# Patient Record
Sex: MALE | Race: WHITE | NOT HISPANIC OR LATINO | Employment: OTHER | URBAN - METROPOLITAN AREA
[De-identification: names, ages, dates, MRNs, and addresses within clinical notes are randomized per-mention and may not be internally consistent; named-entity substitution may affect disease eponyms.]

---

## 2017-05-16 ENCOUNTER — CONVERSION ENCOUNTER (OUTPATIENT)
Dept: CARDIOLOGY | Facility: CLINIC | Age: 77
End: 2017-05-16

## 2017-11-28 ENCOUNTER — CONVERSION ENCOUNTER (OUTPATIENT)
Dept: CARDIOLOGY | Facility: CLINIC | Age: 77
End: 2017-11-28

## 2018-07-31 ENCOUNTER — CONVERSION ENCOUNTER (OUTPATIENT)
Dept: CARDIOLOGY | Facility: CLINIC | Age: 78
End: 2018-07-31

## 2019-02-19 ENCOUNTER — CONVERSION ENCOUNTER (OUTPATIENT)
Dept: CARDIOLOGY | Facility: CLINIC | Age: 79
End: 2019-02-19

## 2019-06-04 VITALS
SYSTOLIC BLOOD PRESSURE: 135 MMHG | SYSTOLIC BLOOD PRESSURE: 139 MMHG | DIASTOLIC BLOOD PRESSURE: 76 MMHG | HEART RATE: 61 BPM | DIASTOLIC BLOOD PRESSURE: 83 MMHG | WEIGHT: 190 LBS | HEART RATE: 63 BPM | HEART RATE: 63 BPM | OXYGEN SATURATION: 96 % | OXYGEN SATURATION: 95 % | HEART RATE: 63 BPM | SYSTOLIC BLOOD PRESSURE: 135 MMHG | OXYGEN SATURATION: 93 % | OXYGEN SATURATION: 93 % | DIASTOLIC BLOOD PRESSURE: 82 MMHG | WEIGHT: 186.38 LBS | DIASTOLIC BLOOD PRESSURE: 80 MMHG | WEIGHT: 186.5 LBS | SYSTOLIC BLOOD PRESSURE: 122 MMHG | WEIGHT: 176 LBS

## 2019-08-23 PROBLEM — I25.10 CORONARY ARTERY DISEASE: Status: ACTIVE | Noted: 2019-08-23

## 2019-08-23 PROBLEM — E78.5 HYPERLIPIDEMIA: Status: ACTIVE | Noted: 2019-08-23

## 2019-08-23 PROBLEM — I10 HYPERTENSION: Status: ACTIVE | Noted: 2019-08-23

## 2019-08-23 RX ORDER — METOPROLOL TARTRATE 100 MG/1
TABLET ORAL
COMMUNITY
Start: 2014-11-10

## 2019-08-23 RX ORDER — TERAZOSIN 2 MG/1
CAPSULE ORAL
COMMUNITY
Start: 2014-11-10

## 2019-08-23 RX ORDER — AMLODIPINE BESYLATE 5 MG/1
TABLET ORAL EVERY 24 HOURS
COMMUNITY
Start: 2018-07-31

## 2019-08-23 RX ORDER — CLONAZEPAM 0.5 MG/1
TABLET ORAL
COMMUNITY
Start: 2016-03-22

## 2019-08-23 RX ORDER — CLONIDINE HYDROCHLORIDE 0.2 MG/1
TABLET ORAL EVERY 24 HOURS
COMMUNITY
Start: 2018-07-31

## 2019-08-23 RX ORDER — OMEPRAZOLE 20 MG/1
CAPSULE, DELAYED RELEASE ORAL
COMMUNITY
Start: 2016-10-11

## 2019-09-03 ENCOUNTER — OFFICE VISIT (OUTPATIENT)
Dept: CARDIOLOGY | Facility: CLINIC | Age: 79
End: 2019-09-03

## 2019-09-03 VITALS
HEIGHT: 70 IN | DIASTOLIC BLOOD PRESSURE: 68 MMHG | BODY MASS INDEX: 23.62 KG/M2 | HEART RATE: 54 BPM | OXYGEN SATURATION: 96 % | WEIGHT: 165 LBS | SYSTOLIC BLOOD PRESSURE: 100 MMHG

## 2019-09-03 DIAGNOSIS — E78.00 PURE HYPERCHOLESTEROLEMIA: ICD-10-CM

## 2019-09-03 DIAGNOSIS — I10 ESSENTIAL HYPERTENSION: ICD-10-CM

## 2019-09-03 DIAGNOSIS — N18.2 CHRONIC RENAL IMPAIRMENT, STAGE 2 (MILD): ICD-10-CM

## 2019-09-03 DIAGNOSIS — I25.118 CORONARY ARTERY DISEASE OF NATIVE ARTERY OF NATIVE HEART WITH STABLE ANGINA PECTORIS (HCC): Primary | ICD-10-CM

## 2019-09-03 PROCEDURE — 99213 OFFICE O/P EST LOW 20 MIN: CPT | Performed by: INTERNAL MEDICINE

## 2019-09-03 RX ORDER — HYDROCODONE BITARTRATE AND ACETAMINOPHEN 10; 325 MG/1; MG/1
1 TABLET ORAL 4 TIMES DAILY
Refills: 0 | COMMUNITY
Start: 2019-08-14

## 2019-09-03 RX ORDER — CITALOPRAM 40 MG/1
40 TABLET ORAL DAILY
Refills: 12 | COMMUNITY
Start: 2019-08-27

## 2019-09-03 NOTE — PROGRESS NOTES
"    Subjective:     Encounter Date:09/03/2019      Patient ID: Jeremias Negro is a 79 y.o. male.    Chief Complaint:  History of Present Illness 79-year-old white male with history of coronary artery disease history of hypertension hyperlipidemia and chronic renal insufficiency presents to my office for follow-up.  Patient is currently stable without any symptoms of chest pain but has some shortness of breath with exertion.  No complaints of any PND orthopnea.  No palpitations dizziness syncope or swelling of the feet.  He is taking his medicines regularly.  He continues to smoke.  He is not able to exercise very well.    The following portions of the patient's history were reviewed and updated as appropriate: allergies, current medications, past family history, past medical history, past social history, past surgical history and problem list.  Past Medical History:   Diagnosis Date   • Coronary artery disease    • Hyperlipidemia    • Hypertension      History reviewed. No pertinent surgical history.  /68 (BP Location: Left arm, Patient Position: Sitting)   Pulse 54   Ht 177.8 cm (70\")   Wt 74.8 kg (165 lb)   SpO2 96%   BMI 23.68 kg/m²   History reviewed. No pertinent family history.    Current Outpatient Medications:   •  CloNIDine (CATAPRES) 0.2 MG tablet, Daily., Disp: , Rfl:   •  HYDROcodone-acetaminophen (NORCO)  MG per tablet, Take 1 tablet by mouth 4 (Four) Times a Day., Disp: , Rfl: 0  •  metoprolol tartrate (LOPRESSOR) 100 MG tablet, METOPROLOL TARTRATE 100 MG TABS, Disp: , Rfl:   •  omeprazole (priLOSEC) 20 MG capsule, OMEPRAZOLE 20 MG CPDR, Disp: , Rfl:   •  terazosin (HYTRIN) 2 MG capsule, TERAZOSIN HCL 2 MG CAPS, Disp: , Rfl:   •  amLODIPine (NORVASC) 5 MG tablet, Daily., Disp: , Rfl:   •  citalopram (CeleXA) 40 MG tablet, Take 40 mg by mouth Daily., Disp: , Rfl: 12  •  clonazePAM (KlonoPIN) 0.5 MG tablet, CLONAZEPAM 0.5 MG TABS, Disp: , Rfl:   No Known Allergies  Social History "     Socioeconomic History   • Marital status:      Spouse name: Not on file   • Number of children: Not on file   • Years of education: Not on file   • Highest education level: Not on file   Tobacco Use   • Smoking status: Current Every Day Smoker   Substance and Sexual Activity   • Alcohol use: No     Frequency: Never     Review of Systems   Constitution: Negative for fever and malaise/fatigue.   Cardiovascular: Negative for chest pain, dyspnea on exertion and palpitations.   Respiratory: Positive for shortness of breath. Negative for cough.    Skin: Negative for rash.   Gastrointestinal: Negative for abdominal pain, nausea and vomiting.   Neurological: Negative for focal weakness and headaches.   All other systems reviewed and are negative.             Objective:     Physical Exam   Constitutional: He appears well-developed and well-nourished.   HENT:   Head: Normocephalic and atraumatic.   Eyes: Conjunctivae are normal. No scleral icterus.   Neck: Normal range of motion. Neck supple. No JVD present. Carotid bruit is not present.   Cardiovascular: Normal rate, regular rhythm, S1 normal, S2 normal, normal heart sounds and intact distal pulses. PMI is not displaced.   Pulmonary/Chest: Effort normal and breath sounds normal. He has no wheezes. He has no rales.   Abdominal: Soft. Bowel sounds are normal.   Neurological: He is alert. He has normal strength.   Skin: Skin is warm and dry. No rash noted.     Procedures    Lab Review:       Assessment:          Diagnosis Plan   1. Coronary artery disease of native artery of native heart with stable angina pectoris (CMS/HCC)     2. Essential hypertension     3. Pure hypercholesterolemia     4. Chronic renal impairment, stage 2 (mild)            Plan:       Patient has history of coronary disease and is currently on medical therapy and stable.  Patient also has COPD and he still continues to smoke.  Patient blood pressure and heart rate are stable per  Patient's lipid  levels are followed by primary care doctor.  His total cholesterol and LDL are high and he should be on a statin.  Patient has chronic renal insufficiency and is followed by the primary care doctor per  Continue current medicines and follow him in 6 months.

## 2025-06-24 ENCOUNTER — HOSPITAL ENCOUNTER (INPATIENT)
Facility: HOSPITAL | Age: 85
LOS: 7 days | Discharge: HOME | End: 2025-07-01
Attending: EMERGENCY MEDICINE | Admitting: INTERNAL MEDICINE
Payer: MEDICARE

## 2025-06-24 ENCOUNTER — APPOINTMENT (OUTPATIENT)
Dept: GENERAL RADIOLOGY | Facility: HOSPITAL | Age: 85
End: 2025-06-24
Payer: MEDICARE

## 2025-06-24 DIAGNOSIS — D72.829 LEUKOCYTOSIS, UNSPECIFIED TYPE: ICD-10-CM

## 2025-06-24 DIAGNOSIS — N17.9 ACUTE KIDNEY INJURY: ICD-10-CM

## 2025-06-24 DIAGNOSIS — R53.1 WEAKNESS: Primary | ICD-10-CM

## 2025-06-24 DIAGNOSIS — M25.552 LEFT HIP PAIN: ICD-10-CM

## 2025-06-24 DIAGNOSIS — J43.9 PULMONARY EMPHYSEMA, UNSPECIFIED EMPHYSEMA TYPE: ICD-10-CM

## 2025-06-24 DIAGNOSIS — I48.0 PAROXYSMAL ATRIAL FIBRILLATION: ICD-10-CM

## 2025-06-24 PROBLEM — E87.6 HYPOKALEMIA: Status: ACTIVE | Noted: 2025-06-24

## 2025-06-24 PROBLEM — D64.9 ANEMIA: Status: ACTIVE | Noted: 2025-06-24

## 2025-06-24 PROBLEM — R79.89 ELEVATED LACTIC ACID LEVEL: Status: ACTIVE | Noted: 2025-06-24

## 2025-06-24 LAB
ALBUMIN SERPL-MCNC: 2.9 G/DL (ref 3.5–5.2)
ALBUMIN/GLOB SERPL: 0.9 G/DL
ALP SERPL-CCNC: 124 U/L (ref 39–117)
ALT SERPL W P-5'-P-CCNC: 6 U/L (ref 1–41)
ANION GAP SERPL CALCULATED.3IONS-SCNC: 15 MMOL/L (ref 5–15)
AST SERPL-CCNC: 23 U/L (ref 1–40)
BILIRUB SERPL-MCNC: 0.7 MG/DL (ref 0–1.2)
BUN SERPL-MCNC: 28.8 MG/DL (ref 8–23)
BUN/CREAT SERPL: 14.2 (ref 7–25)
CALCIUM SPEC-SCNC: 9 MG/DL (ref 8.6–10.5)
CHLORIDE SERPL-SCNC: 105 MMOL/L (ref 98–107)
CK SERPL-CCNC: 46 U/L (ref 20–200)
CO2 SERPL-SCNC: 20 MMOL/L (ref 22–29)
CREAT SERPL-MCNC: 2.03 MG/DL (ref 0.76–1.27)
D-LACTATE SERPL-SCNC: 3.3 MMOL/L (ref 0.3–2)
D-LACTATE SERPL-SCNC: 3.4 MMOL/L (ref 0.5–2)
DEPRECATED RDW RBC AUTO: 57.1 FL (ref 37–54)
EGFRCR SERPLBLD CKD-EPI 2021: 31.9 ML/MIN/1.73
EOSINOPHIL # BLD MANUAL: 0.97 10*3/MM3 (ref 0–0.4)
EOSINOPHIL NFR BLD MANUAL: 2 % (ref 0.3–6.2)
ERYTHROCYTE [DISTWIDTH] IN BLOOD BY AUTOMATED COUNT: 17.1 % (ref 12.3–15.4)
FERRITIN SERPL-MCNC: 667 NG/ML (ref 30–400)
GLOBULIN UR ELPH-MCNC: 3.4 GM/DL
GLUCOSE SERPL-MCNC: 108 MG/DL (ref 65–99)
HCT VFR BLD AUTO: 32.1 % (ref 37.5–51)
HGB BLD-MCNC: 9.9 G/DL (ref 13–17.7)
HOLD SPECIMEN: NORMAL
IRON 24H UR-MRATE: 38 MCG/DL (ref 59–158)
IRON SATN MFR SERPL: 19 % (ref 20–50)
LYMPHOCYTES # BLD MANUAL: 1.94 10*3/MM3 (ref 0.7–3.1)
LYMPHOCYTES NFR BLD MANUAL: 2 % (ref 5–12)
MAGNESIUM SERPL-MCNC: 2.3 MG/DL (ref 1.6–2.4)
MCH RBC QN AUTO: 28.8 PG (ref 26.6–33)
MCHC RBC AUTO-ENTMCNC: 30.8 G/DL (ref 31.5–35.7)
MCV RBC AUTO: 93.3 FL (ref 79–97)
MONOCYTES # BLD: 0.97 10*3/MM3 (ref 0.1–0.9)
NEUTROPHILS # BLD AUTO: 44.55 10*3/MM3 (ref 1.7–7)
NEUTROPHILS NFR BLD MANUAL: 92 % (ref 42.7–76)
PLATELET # BLD AUTO: 315 10*3/MM3 (ref 140–450)
PMV BLD AUTO: 9.5 FL (ref 6–12)
POTASSIUM SERPL-SCNC: 3.3 MMOL/L (ref 3.5–5.2)
PROCALCITONIN SERPL-MCNC: 0.28 NG/ML (ref 0–0.25)
PROT SERPL-MCNC: 6.3 G/DL (ref 6–8.5)
RBC # BLD AUTO: 3.44 10*6/MM3 (ref 4.14–5.8)
RBC MORPH BLD: NORMAL
SCAN SLIDE: NORMAL
SMALL PLATELETS BLD QL SMEAR: ADEQUATE
SODIUM SERPL-SCNC: 140 MMOL/L (ref 136–145)
TIBC SERPL-MCNC: 204 MCG/DL (ref 298–536)
TRANSFERRIN SERPL-MCNC: 137 MG/DL (ref 200–360)
TSH SERPL DL<=0.05 MIU/L-ACNC: 1.49 UIU/ML (ref 0.27–4.2)
VARIANT LYMPHS NFR BLD MANUAL: 4 % (ref 19.6–45.3)
WBC MORPH BLD: NORMAL
WBC NRBC COR # BLD AUTO: 48.42 10*3/MM3 (ref 3.4–10.8)
WHOLE BLOOD HOLD COAG: NORMAL

## 2025-06-24 PROCEDURE — 85025 COMPLETE CBC W/AUTO DIFF WBC: CPT | Performed by: EMERGENCY MEDICINE

## 2025-06-24 PROCEDURE — 82728 ASSAY OF FERRITIN: CPT

## 2025-06-24 PROCEDURE — 82550 ASSAY OF CK (CPK): CPT | Performed by: EMERGENCY MEDICINE

## 2025-06-24 PROCEDURE — 93010 ELECTROCARDIOGRAM REPORT: CPT | Performed by: INTERNAL MEDICINE

## 2025-06-24 PROCEDURE — 83735 ASSAY OF MAGNESIUM: CPT | Performed by: EMERGENCY MEDICINE

## 2025-06-24 PROCEDURE — 82746 ASSAY OF FOLIC ACID SERUM: CPT

## 2025-06-24 PROCEDURE — 83605 ASSAY OF LACTIC ACID: CPT

## 2025-06-24 PROCEDURE — 84145 PROCALCITONIN (PCT): CPT

## 2025-06-24 PROCEDURE — 82607 VITAMIN B-12: CPT

## 2025-06-24 PROCEDURE — 25810000003 SODIUM CHLORIDE 0.9 % SOLUTION

## 2025-06-24 PROCEDURE — 85007 BL SMEAR W/DIFF WBC COUNT: CPT | Performed by: EMERGENCY MEDICINE

## 2025-06-24 PROCEDURE — 87040 BLOOD CULTURE FOR BACTERIA: CPT | Performed by: EMERGENCY MEDICINE

## 2025-06-24 PROCEDURE — 36415 COLL VENOUS BLD VENIPUNCTURE: CPT

## 2025-06-24 PROCEDURE — 84443 ASSAY THYROID STIM HORMONE: CPT | Performed by: EMERGENCY MEDICINE

## 2025-06-24 PROCEDURE — 83540 ASSAY OF IRON: CPT

## 2025-06-24 PROCEDURE — 80053 COMPREHEN METABOLIC PANEL: CPT | Performed by: EMERGENCY MEDICINE

## 2025-06-24 PROCEDURE — 71045 X-RAY EXAM CHEST 1 VIEW: CPT

## 2025-06-24 PROCEDURE — 99291 CRITICAL CARE FIRST HOUR: CPT

## 2025-06-24 PROCEDURE — 93005 ELECTROCARDIOGRAM TRACING: CPT

## 2025-06-24 PROCEDURE — 84466 ASSAY OF TRANSFERRIN: CPT

## 2025-06-24 PROCEDURE — 25010000002 PIPERACILLIN SOD-TAZOBACTAM PER 1 G: Performed by: EMERGENCY MEDICINE

## 2025-06-24 RX ORDER — POTASSIUM CHLORIDE 1500 MG/1
20 TABLET, EXTENDED RELEASE ORAL ONCE
Status: COMPLETED | OUTPATIENT
Start: 2025-06-24 | End: 2025-06-24

## 2025-06-24 RX ORDER — ENOXAPARIN SODIUM 100 MG/ML
1 INJECTION SUBCUTANEOUS EVERY 24 HOURS
Status: DISCONTINUED | OUTPATIENT
Start: 2025-06-25 | End: 2025-06-27

## 2025-06-24 RX ORDER — NITROGLYCERIN 0.4 MG/1
0.4 TABLET SUBLINGUAL
Status: DISCONTINUED | OUTPATIENT
Start: 2025-06-24 | End: 2025-07-01 | Stop reason: HOSPADM

## 2025-06-24 RX ORDER — METOPROLOL TARTRATE 50 MG
100 TABLET ORAL DAILY
Status: DISCONTINUED | OUTPATIENT
Start: 2025-06-24 | End: 2025-06-24

## 2025-06-24 RX ORDER — AMLODIPINE BESYLATE 5 MG/1
5 TABLET ORAL EVERY 24 HOURS
Status: DISCONTINUED | OUTPATIENT
Start: 2025-06-24 | End: 2025-07-01 | Stop reason: HOSPADM

## 2025-06-24 RX ORDER — BISACODYL 5 MG/1
5 TABLET, DELAYED RELEASE ORAL DAILY PRN
Status: DISCONTINUED | OUTPATIENT
Start: 2025-06-24 | End: 2025-06-25

## 2025-06-24 RX ORDER — CLONAZEPAM 0.5 MG/1
0.5 TABLET ORAL DAILY PRN
Status: DISCONTINUED | OUTPATIENT
Start: 2025-06-24 | End: 2025-06-24

## 2025-06-24 RX ORDER — CITALOPRAM HYDROBROMIDE 20 MG/1
40 TABLET ORAL DAILY
Status: DISCONTINUED | OUTPATIENT
Start: 2025-06-24 | End: 2025-07-01 | Stop reason: HOSPADM

## 2025-06-24 RX ORDER — CLONIDINE HYDROCHLORIDE 0.1 MG/1
0.2 TABLET ORAL EVERY 24 HOURS
Status: DISCONTINUED | OUTPATIENT
Start: 2025-06-24 | End: 2025-07-01 | Stop reason: HOSPADM

## 2025-06-24 RX ORDER — HYDROCODONE BITARTRATE AND ACETAMINOPHEN 10; 325 MG/1; MG/1
1 TABLET ORAL 4 TIMES DAILY
Status: DISCONTINUED | OUTPATIENT
Start: 2025-06-24 | End: 2025-06-24

## 2025-06-24 RX ORDER — HYDROCODONE BITARTRATE AND ACETAMINOPHEN 10; 325 MG/1; MG/1
1 TABLET ORAL EVERY 6 HOURS PRN
Refills: 0 | Status: DISCONTINUED | OUTPATIENT
Start: 2025-06-24 | End: 2025-07-01 | Stop reason: HOSPADM

## 2025-06-24 RX ORDER — ACETAMINOPHEN 325 MG/1
650 TABLET ORAL EVERY 4 HOURS PRN
Status: DISCONTINUED | OUTPATIENT
Start: 2025-06-24 | End: 2025-07-01 | Stop reason: HOSPADM

## 2025-06-24 RX ORDER — SODIUM CHLORIDE 0.9 % (FLUSH) 0.9 %
10 SYRINGE (ML) INJECTION EVERY 12 HOURS SCHEDULED
Status: DISCONTINUED | OUTPATIENT
Start: 2025-06-24 | End: 2025-07-01 | Stop reason: HOSPADM

## 2025-06-24 RX ORDER — SODIUM CHLORIDE 0.9 % (FLUSH) 0.9 %
10 SYRINGE (ML) INJECTION AS NEEDED
Status: DISCONTINUED | OUTPATIENT
Start: 2025-06-24 | End: 2025-07-01 | Stop reason: HOSPADM

## 2025-06-24 RX ORDER — POLYETHYLENE GLYCOL 3350 17 G/17G
17 POWDER, FOR SOLUTION ORAL DAILY PRN
Status: DISCONTINUED | OUTPATIENT
Start: 2025-06-24 | End: 2025-06-25

## 2025-06-24 RX ORDER — PANTOPRAZOLE SODIUM 40 MG/1
40 TABLET, DELAYED RELEASE ORAL
Status: DISCONTINUED | OUTPATIENT
Start: 2025-06-25 | End: 2025-07-01 | Stop reason: HOSPADM

## 2025-06-24 RX ORDER — SODIUM CHLORIDE 9 MG/ML
100 INJECTION, SOLUTION INTRAVENOUS CONTINUOUS
Status: DISPENSED | OUTPATIENT
Start: 2025-06-24 | End: 2025-06-26

## 2025-06-24 RX ORDER — ONDANSETRON 2 MG/ML
4 INJECTION INTRAMUSCULAR; INTRAVENOUS EVERY 6 HOURS PRN
Status: DISCONTINUED | OUTPATIENT
Start: 2025-06-24 | End: 2025-07-01 | Stop reason: HOSPADM

## 2025-06-24 RX ORDER — SODIUM CHLORIDE 9 MG/ML
40 INJECTION, SOLUTION INTRAVENOUS AS NEEDED
Status: DISCONTINUED | OUTPATIENT
Start: 2025-06-24 | End: 2025-07-01 | Stop reason: HOSPADM

## 2025-06-24 RX ORDER — ONDANSETRON 4 MG/1
4 TABLET, ORALLY DISINTEGRATING ORAL EVERY 6 HOURS PRN
Status: DISCONTINUED | OUTPATIENT
Start: 2025-06-24 | End: 2025-07-01 | Stop reason: HOSPADM

## 2025-06-24 RX ORDER — AMOXICILLIN 250 MG
2 CAPSULE ORAL 2 TIMES DAILY PRN
Status: DISCONTINUED | OUTPATIENT
Start: 2025-06-24 | End: 2025-06-25

## 2025-06-24 RX ORDER — BISACODYL 10 MG
10 SUPPOSITORY, RECTAL RECTAL DAILY PRN
Status: DISCONTINUED | OUTPATIENT
Start: 2025-06-24 | End: 2025-06-25

## 2025-06-24 RX ADMIN — Medication 10 ML: at 22:58

## 2025-06-24 RX ADMIN — Medication 12.5 MG: at 22:56

## 2025-06-24 RX ADMIN — PIPERACILLIN AND TAZOBACTAM 3.38 G: 3; .375 INJECTION, POWDER, FOR SOLUTION INTRAVENOUS at 18:57

## 2025-06-24 RX ADMIN — SODIUM CHLORIDE, POTASSIUM CHLORIDE, SODIUM LACTATE AND CALCIUM CHLORIDE 1770 ML: 600; 310; 30; 20 INJECTION, SOLUTION INTRAVENOUS at 17:35

## 2025-06-24 RX ADMIN — POTASSIUM CHLORIDE 20 MEQ: 1500 TABLET, EXTENDED RELEASE ORAL at 22:56

## 2025-06-24 RX ADMIN — SODIUM CHLORIDE 100 ML/HR: 9 INJECTION, SOLUTION INTRAVENOUS at 23:41

## 2025-06-24 NOTE — ED NOTES
Pt sent by primary care due to elevated white count; no other complaints at this time. Family at bedside, pt applied to monitor, stretcher in low position.

## 2025-06-24 NOTE — ED PROVIDER NOTES
Subjective   History of Present Illness  Chief complaint sent by my doctor because they think I have leukemia with an abnormal white blood count I am supposed be admitted to the hospital    History of present illness 84-year-old gentleman who states has been feeling bad for several weeks.  He states that he went to the doctor yesterday they did a blood count they called today and said his white count was 42,000 he needed to go to the hospital to be admitted and worked up for leukemia.  Patient denies any cough congestion vomiting diarrhea no recent trauma or injury otherwise.  Denies any urinary complaints he has been somewhat constipated.  No shortness of breath no headache or vision change speech difficulty just generalized weakness.  No tick bites or rashes      Review of Systems   Constitutional:  Positive for fatigue. Negative for chills and fever.   Respiratory:  Negative for cough, chest tightness and shortness of breath.    Cardiovascular:  Negative for chest pain, palpitations and leg swelling.   Gastrointestinal:  Positive for constipation. Negative for abdominal pain, blood in stool and vomiting.   Genitourinary:  Negative for difficulty urinating and dysuria.   Skin:  Negative for rash.   Neurological:  Positive for weakness. Negative for facial asymmetry and speech difficulty.   Psychiatric/Behavioral:  Negative for confusion.        Past Medical History:   Diagnosis Date    Coronary artery disease     Hyperlipidemia     Hypertension        No Known Allergies    No past surgical history on file.    No family history on file.    Social History     Socioeconomic History    Marital status:    Tobacco Use    Smoking status: Every Day   Substance and Sexual Activity    Alcohol use: No     Prior to Admission medications    Medication Sig Start Date End Date Taking? Authorizing Provider   metoprolol tartrate (LOPRESSOR) 100 MG tablet METOPROLOL TARTRATE 100 MG TABS 11/10/14  Yes Provider, MD Maria Luz    amLODIPine (NORVASC) 5 MG tablet Daily. 7/31/18   Maria Luz Carey MD   citalopram (CeleXA) 40 MG tablet Take 1 tablet by mouth Daily. 8/27/19   Maria Luz Carey MD   CloNIDine (CATAPRES) 0.2 MG tablet Daily. 7/31/18   Maria Luz Carey MD   HYDROcodone-acetaminophen (NORCO)  MG per tablet Take 1 tablet by mouth 4 (Four) Times a Day. 8/14/19   Maria Luz Carey MD   omeprazole (priLOSEC) 20 MG capsule OMEPRAZOLE 20 MG CPDR 10/11/16   Maria Luz Carey MD   terazosin (HYTRIN) 2 MG capsule TERAZOSIN HCL 2 MG CAPS 11/10/14   Maria Luz Carey MD   clonazePAM (KlonoPIN) 0.5 MG tablet CLONAZEPAM 0.5 MG TABS 3/22/16 6/24/25  Maria Luz Carey MD          Objective   Physical Exam  Constitutional is an 84-year-old gentleman awake alert no acute distress but chronically ill-appearing triage vital signs have been reviewed HEENT extraocular muscles are intact pupils equal round reactive there is no photophobia mouth is clear neck supple no adenopathy no JV no bruits no meningeal signs.  Lungs rhonchi throughout but no retraction no use of accessories heart regular without murmur abdomen was soft nontender nondistended good bowel sounds patient has an enlarged liver on palpation.  Feels like he has had some superficial nodes.  No pulsatile masses extremities pulses equal upper extremities some edema in his legs no cords or Homans' sign no evidence of DVT skin is warm dry without rashes or cellulitic changes he is got a mass to the right shoulder and its hard but nontender.  No redness or warmth or signs of infection neurologic awake alert orientated x 3 no face asymmetry speech normal no focal weak  Procedures           ED Course      Results for orders placed or performed during the hospital encounter of 06/24/25   Comprehensive Metabolic Panel    Collection Time: 06/24/25  5:20 PM    Specimen: Blood   Result Value Ref Range    Glucose 108 (H) 65 - 99 mg/dL    BUN 28.8 (H) 8.0 - 23.0  mg/dL    Creatinine 2.03 (H) 0.76 - 1.27 mg/dL    Sodium 140 136 - 145 mmol/L    Potassium 3.3 (L) 3.5 - 5.2 mmol/L    Chloride 105 98 - 107 mmol/L    CO2 20.0 (L) 22.0 - 29.0 mmol/L    Calcium 9.0 8.6 - 10.5 mg/dL    Total Protein 6.3 6.0 - 8.5 g/dL    Albumin 2.9 (L) 3.5 - 5.2 g/dL    ALT (SGPT) 6 1 - 41 U/L    AST (SGOT) 23 1 - 40 U/L    Alkaline Phosphatase 124 (H) 39 - 117 U/L    Total Bilirubin 0.7 0.0 - 1.2 mg/dL    Globulin 3.4 gm/dL    A/G Ratio 0.9 g/dL    BUN/Creatinine Ratio 14.2 7.0 - 25.0    Anion Gap 15.0 5.0 - 15.0 mmol/L    eGFR 31.9 (L) >60.0 mL/min/1.73   Magnesium    Collection Time: 06/24/25  5:20 PM    Specimen: Blood   Result Value Ref Range    Magnesium 2.3 1.6 - 2.4 mg/dL   CK    Collection Time: 06/24/25  5:20 PM    Specimen: Blood   Result Value Ref Range    Creatine Kinase 46 20 - 200 U/L   TSH Rfx On Abnormal To Free T4    Collection Time: 06/24/25  5:20 PM    Specimen: Blood   Result Value Ref Range    TSH 1.490 0.270 - 4.200 uIU/mL   CBC Auto Differential    Collection Time: 06/24/25  5:20 PM    Specimen: Blood   Result Value Ref Range    WBC 48.42 (C) 3.40 - 10.80 10*3/mm3    RBC 3.44 (L) 4.14 - 5.80 10*6/mm3    Hemoglobin 9.9 (L) 13.0 - 17.7 g/dL    Hematocrit 32.1 (L) 37.5 - 51.0 %    MCV 93.3 79.0 - 97.0 fL    MCH 28.8 26.6 - 33.0 pg    MCHC 30.8 (L) 31.5 - 35.7 g/dL    RDW 17.1 (H) 12.3 - 15.4 %    RDW-SD 57.1 (H) 37.0 - 54.0 fl    MPV 9.5 6.0 - 12.0 fL    Platelets 315 140 - 450 10*3/mm3   Scan Slide    Collection Time: 06/24/25  5:20 PM    Specimen: Blood   Result Value Ref Range    Scan Slide     Manual Differential    Collection Time: 06/24/25  5:20 PM    Specimen: Blood   Result Value Ref Range    Neutrophil % 92.0 (H) 42.7 - 76.0 %    Lymphocyte % 4.0 (L) 19.6 - 45.3 %    Monocyte % 2.0 (L) 5.0 - 12.0 %    Eosinophil % 2.0 0.3 - 6.2 %    Neutrophils Absolute 44.55 (H) 1.70 - 7.00 10*3/mm3    Lymphocytes Absolute 1.94 0.70 - 3.10 10*3/mm3    Monocytes Absolute 0.97 (H) 0.10 -  0.90 10*3/mm3    Eosinophils Absolute 0.97 (H) 0.00 - 0.40 10*3/mm3    RBC Morphology Normal Normal    WBC Morphology Normal Normal    Platelet Estimate Adequate Normal   Gold Top - SST    Collection Time: 06/24/25  5:20 PM   Result Value Ref Range    Extra Tube Hold for add-ons.    Light Blue Top    Collection Time: 06/24/25  5:20 PM   Result Value Ref Range    Extra Tube Hold for add-ons.    POC Lactate    Collection Time: 06/24/25  5:24 PM    Specimen: Blood   Result Value Ref Range    Lactate 3.3 (C) 0.3 - 2.0 mmol/L     XR Chest 1 View  Result Date: 6/24/2025  Impression: Vague reticulonodular airspace opacities suspicious for infectious/inflammatory process. Etiologies may include bronchiolitis, aspiration or developing bronchopneumonia. Electronically Signed: Andres Llanos MD  6/24/2025 5:53 PM EDT  Workstation ID: [id number]    Medications   sodium chloride 0.9 % flush 10 mL (has no administration in time range)   piperacillin-tazobactam (ZOSYN) 3.375 g IVPB in 100 mL NS MBP (CD) (has no administration in time range)   sepsis fluid LR bolus 1,770 mL (1,770 mL Intravenous New Bag 6/24/25 1735)       SEPTIC SHOCK FOCUSED EXAM ATTESTATION    I attest that I have reassessed tissue perfusion after the fluid bolus given.    Tomer Rivera MD  06/24/25  22:29 EDT                                                  Medical Decision Making  Medical Decision Making patient's initial blood pressure was in the 80s.  He triggered sepsis protocol labs obtaining cultures then he is given a sepsis bolus 30.  Kilo bolus of lactated Ringer's and started on Zosyn.  Patient's labs obtained my independent review comprehensive metabolic profile remarkable BUN 28 and creatinine 2.03 potassium 3.3 normal.  CK normal TSH normal CBC remarkable for white count of 48,000 hemoglobin of 9.9 platelets were 315,000 cultures pending lactic acid 3.3.  Patient has been cultured up started on Zosyn and receiving IV fluids 30 mL/kg bolus blood  pressure did improve to the 110 range.  Patient resting company no distress.  Chest x-ray obtained my independent review is got a lot of sort of reticulonodular pattern.  I do not see any pneumothorax or failure radiologist is vague reticulonodular airspace opacity suspicious for infectious or inflammatory process.  Patient on repeat exam resting comfortably I do not see any evidence that suggest an acute intra-abdominal process such as aneurysm or dissection or ischemic bowel or bowel obstruction or perforation appendicitis diverticulitis cholecystitis I see no evidence that suggest an acute stroke meningitis or encephalitis or skin changes or cellulitic changes based on my history and physical clinical finds although not a complete list of all possibilities.  Records show that he was admitted to [facility] back in January of this year he had COVID-19 his respiratory issues and hypoxia his CBC was normal at that time.  I talked to the nurse practitioner for Dr Collazo and the case is discussed and patient was sent here by the primary care provider to be admitted for workup for leukemia.  Hypotension resolved repeat lactates were obtained.  Critical care 35-minute.  Patient feeling much better vital signs are stable lungs are some rhonchi throughout heart was regular abdomen soft without a particular tenderness no skin changes good cap refill.  This is after IV fluids    Problems Addressed:  Acute kidney injury: complicated acute illness or injury  Leukocytosis, unspecified type: complicated acute illness or injury  Weakness: complicated acute illness or injury    Amount and/or Complexity of Data Reviewed  External Data Reviewed: labs and notes.  Labs: ordered. Decision-making details documented in ED Course.  Radiology: ordered and independent interpretation performed. Decision-making details documented in ED Course.    Risk  Prescription drug management.  Decision regarding hospitalization.        Final  diagnoses:   Weakness   Leukocytosis, unspecified type   Acute kidney injury       ED Disposition  ED Disposition       ED Disposition   Decision to Admit    Condition   --    Comment   Level of Care: Telemetry [5]   Admitting Physician: VANESSA VERA [[id number]   Attending Physician: VANESSA VERA [[id number]]                 No follow-up provider specified.       Medication List      No changes were made to your prescriptions during this visit.            Tomer Rivera MD  06/24/25 [id number]

## 2025-06-25 ENCOUNTER — APPOINTMENT (OUTPATIENT)
Dept: ULTRASOUND IMAGING | Facility: HOSPITAL | Age: 85
End: 2025-06-25
Payer: MEDICARE

## 2025-06-25 ENCOUNTER — APPOINTMENT (OUTPATIENT)
Dept: CARDIOLOGY | Facility: HOSPITAL | Age: 85
End: 2025-06-25
Payer: MEDICARE

## 2025-06-25 ENCOUNTER — APPOINTMENT (OUTPATIENT)
Dept: CT IMAGING | Facility: HOSPITAL | Age: 85
End: 2025-06-25
Payer: MEDICARE

## 2025-06-25 PROBLEM — R63.4 WEIGHT LOSS: Status: ACTIVE | Noted: 2025-06-25

## 2025-06-25 PROBLEM — H91.90 HARD OF HEARING: Status: ACTIVE | Noted: 2025-06-25

## 2025-06-25 PROBLEM — M25.552 LEFT HIP PAIN: Status: ACTIVE | Noted: 2025-06-25

## 2025-06-25 PROBLEM — R42 DIZZINESS: Status: ACTIVE | Noted: 2025-06-25

## 2025-06-25 PROBLEM — K59.00 CONSTIPATION: Status: ACTIVE | Noted: 2025-06-25

## 2025-06-25 PROBLEM — F17.210 CIGARETTE SMOKER: Status: ACTIVE | Noted: 2025-06-25

## 2025-06-25 PROBLEM — I48.91 ATRIAL FIBRILLATION: Status: ACTIVE | Noted: 2025-06-25

## 2025-06-25 PROBLEM — A41.9 SEPSIS, UNSPECIFIED ORGANISM: Status: ACTIVE | Noted: 2025-06-25

## 2025-06-25 PROBLEM — N18.30 CKD (CHRONIC KIDNEY DISEASE) STAGE 3, GFR 30-59 ML/MIN: Status: ACTIVE | Noted: 2025-06-25

## 2025-06-25 PROBLEM — R29.6 FALLS FREQUENTLY: Status: ACTIVE | Noted: 2025-06-25

## 2025-06-25 PROBLEM — F17.200 CURRENT EVERY DAY SMOKER: Status: ACTIVE | Noted: 2025-06-25

## 2025-06-25 PROBLEM — R53.1 WEAKNESS: Status: ACTIVE | Noted: 2025-06-25

## 2025-06-25 LAB
ANION GAP SERPL CALCULATED.3IONS-SCNC: 14.1 MMOL/L (ref 5–15)
AORTIC DIMENSIONLESS INDEX: 0.19 (DI)
AV MEAN PRESS GRAD SYS DOP V1V2: 33.2 MMHG
AV VMAX SYS DOP: 359.3 CM/SEC
BACTERIA UR QL AUTO: ABNORMAL /HPF
BASOPHILS # BLD AUTO: 0.17 10*3/MM3 (ref 0–0.2)
BASOPHILS NFR BLD AUTO: 0.4 % (ref 0–1.5)
BH CV ECHO MEAS - AO MAX PG: 51.6 MMHG
BH CV ECHO MEAS - AO V2 VTI: 78.1 CM
BH CV ECHO MEAS - AVA(I,D): 0.61 CM2
BH CV ECHO MEAS - EDV(CUBED): 141 ML
BH CV ECHO MEAS - EDV(MOD-SP2): 133 ML
BH CV ECHO MEAS - EDV(MOD-SP4): 140 ML
BH CV ECHO MEAS - EF(MOD-SP2): 44.5 %
BH CV ECHO MEAS - EF(MOD-SP4): 47.6 %
BH CV ECHO MEAS - ESV(CUBED): 49 ML
BH CV ECHO MEAS - ESV(MOD-SP2): 73.8 ML
BH CV ECHO MEAS - ESV(MOD-SP4): 73.3 ML
BH CV ECHO MEAS - FS: 29.7 %
BH CV ECHO MEAS - IVS/LVPW: 1.04 CM
BH CV ECHO MEAS - IVSD: 1.08 CM
BH CV ECHO MEAS - LA DIMENSION: 4 CM
BH CV ECHO MEAS - LAT PEAK E' VEL: 13.8 CM/SEC
BH CV ECHO MEAS - LV DIASTOLIC VOL/BSA (35-75): 79.5 CM2
BH CV ECHO MEAS - LV MASS(C)D: 208.9 GRAMS
BH CV ECHO MEAS - LV MAX PG: 2.44 MMHG
BH CV ECHO MEAS - LV MEAN PG: 1.39 MMHG
BH CV ECHO MEAS - LV SYSTOLIC VOL/BSA (12-30): 41.6 CM2
BH CV ECHO MEAS - LV V1 MAX: 78.1 CM/SEC
BH CV ECHO MEAS - LV V1 VTI: 14.7 CM
BH CV ECHO MEAS - LVIDD: 5.2 CM
BH CV ECHO MEAS - LVIDS: 3.7 CM
BH CV ECHO MEAS - LVOT AREA: 3.3 CM2
BH CV ECHO MEAS - LVOT DIAM: 2.04 CM
BH CV ECHO MEAS - LVPWD: 1.03 CM
BH CV ECHO MEAS - MED PEAK E' VEL: 11.6 CM/SEC
BH CV ECHO MEAS - MR MAX PG: 37.6 MMHG
BH CV ECHO MEAS - MR MAX VEL: 306.7 CM/SEC
BH CV ECHO MEAS - MV DEC SLOPE: 1139 CM/SEC2
BH CV ECHO MEAS - MV E MAX VEL: 128.3 CM/SEC
BH CV ECHO MEAS - MV MAX PG: 10.6 MMHG
BH CV ECHO MEAS - MV MEAN PG: 3.9 MMHG
BH CV ECHO MEAS - MV P1/2T: 38.8 MSEC
BH CV ECHO MEAS - MV V2 VTI: 20.3 CM
BH CV ECHO MEAS - MVA(P1/2T): 5.7 CM2
BH CV ECHO MEAS - MVA(VTI): 2.37 CM2
BH CV ECHO MEAS - PA ACC TIME: 0.06 SEC
BH CV ECHO MEAS - PA V2 MAX: 92.7 CM/SEC
BH CV ECHO MEAS - RV MAX PG: 1.74 MMHG
BH CV ECHO MEAS - RV V1 MAX: 66 CM/SEC
BH CV ECHO MEAS - RV V1 VTI: 13.2 CM
BH CV ECHO MEAS - SV(LVOT): 48 ML
BH CV ECHO MEAS - SV(MOD-SP2): 59.2 ML
BH CV ECHO MEAS - SV(MOD-SP4): 66.7 ML
BH CV ECHO MEAS - SVI(LVOT): 27.3 ML/M2
BH CV ECHO MEAS - SVI(MOD-SP2): 33.6 ML/M2
BH CV ECHO MEAS - SVI(MOD-SP4): 37.9 ML/M2
BH CV ECHO MEAS - TAPSE (>1.6): 1.56 CM
BH CV ECHO MEAS - TR MAX PG: 26.9 MMHG
BH CV ECHO MEAS - TR MAX VEL: 259.2 CM/SEC
BH CV ECHO MEASUREMENTS AVERAGE E/E' RATIO: 10.1
BH CV LOWER VASCULAR LEFT COMMON FEMORAL AUGMENT: NORMAL
BH CV LOWER VASCULAR LEFT COMMON FEMORAL COMPETENT: NORMAL
BH CV LOWER VASCULAR LEFT COMMON FEMORAL COMPRESS: NORMAL
BH CV LOWER VASCULAR LEFT COMMON FEMORAL PHASIC: NORMAL
BH CV LOWER VASCULAR LEFT COMMON FEMORAL SPONT: NORMAL
BH CV LOWER VASCULAR LEFT DISTAL FEMORAL COMPRESS: NORMAL
BH CV LOWER VASCULAR LEFT GASTRONEMIUS COMPRESS: NORMAL
BH CV LOWER VASCULAR LEFT GREATER SAPH AK COMPRESS: NORMAL
BH CV LOWER VASCULAR LEFT GREATER SAPH BK COMPRESS: NORMAL
BH CV LOWER VASCULAR LEFT LESSER SAPH COMPRESS: NORMAL
BH CV LOWER VASCULAR LEFT MID FEMORAL AUGMENT: NORMAL
BH CV LOWER VASCULAR LEFT MID FEMORAL COMPETENT: NORMAL
BH CV LOWER VASCULAR LEFT MID FEMORAL COMPRESS: NORMAL
BH CV LOWER VASCULAR LEFT MID FEMORAL PHASIC: NORMAL
BH CV LOWER VASCULAR LEFT MID FEMORAL SPONT: NORMAL
BH CV LOWER VASCULAR LEFT PERONEAL COMPRESS: NORMAL
BH CV LOWER VASCULAR LEFT POPLITEAL AUGMENT: NORMAL
BH CV LOWER VASCULAR LEFT POPLITEAL COMPETENT: NORMAL
BH CV LOWER VASCULAR LEFT POPLITEAL COMPRESS: NORMAL
BH CV LOWER VASCULAR LEFT POPLITEAL PHASIC: NORMAL
BH CV LOWER VASCULAR LEFT POPLITEAL SPONT: NORMAL
BH CV LOWER VASCULAR LEFT POSTERIOR TIBIAL COMPRESS: NORMAL
BH CV LOWER VASCULAR LEFT PROFUNDA FEMORAL COMPRESS: NORMAL
BH CV LOWER VASCULAR LEFT PROXIMAL FEMORAL COMPRESS: NORMAL
BH CV LOWER VASCULAR LEFT SAPHENOFEMORAL JUNCTION COMPRESS: NORMAL
BH CV LOWER VASCULAR RIGHT COMMON FEMORAL AUGMENT: NORMAL
BH CV LOWER VASCULAR RIGHT COMMON FEMORAL COMPETENT: NORMAL
BH CV LOWER VASCULAR RIGHT COMMON FEMORAL COMPRESS: NORMAL
BH CV LOWER VASCULAR RIGHT COMMON FEMORAL PHASIC: NORMAL
BH CV LOWER VASCULAR RIGHT COMMON FEMORAL SPONT: NORMAL
BH CV LOWER VASCULAR RIGHT DISTAL FEMORAL COMPRESS: NORMAL
BH CV LOWER VASCULAR RIGHT GASTRONEMIUS COMPRESS: NORMAL
BH CV LOWER VASCULAR RIGHT GREATER SAPH AK COMPRESS: NORMAL
BH CV LOWER VASCULAR RIGHT GREATER SAPH BK COMPRESS: NORMAL
BH CV LOWER VASCULAR RIGHT LESSER SAPH COMPRESS: NORMAL
BH CV LOWER VASCULAR RIGHT MID FEMORAL AUGMENT: NORMAL
BH CV LOWER VASCULAR RIGHT MID FEMORAL COMPETENT: NORMAL
BH CV LOWER VASCULAR RIGHT MID FEMORAL COMPRESS: NORMAL
BH CV LOWER VASCULAR RIGHT MID FEMORAL PHASIC: NORMAL
BH CV LOWER VASCULAR RIGHT MID FEMORAL SPONT: NORMAL
BH CV LOWER VASCULAR RIGHT PERONEAL COMPRESS: NORMAL
BH CV LOWER VASCULAR RIGHT POPLITEAL AUGMENT: NORMAL
BH CV LOWER VASCULAR RIGHT POPLITEAL COMPETENT: NORMAL
BH CV LOWER VASCULAR RIGHT POPLITEAL COMPRESS: NORMAL
BH CV LOWER VASCULAR RIGHT POPLITEAL PHASIC: NORMAL
BH CV LOWER VASCULAR RIGHT POPLITEAL SPONT: NORMAL
BH CV LOWER VASCULAR RIGHT POSTERIOR TIBIAL COMPRESS: NORMAL
BH CV LOWER VASCULAR RIGHT PROFUNDA FEMORAL COMPRESS: NORMAL
BH CV LOWER VASCULAR RIGHT PROXIMAL FEMORAL COMPRESS: NORMAL
BH CV LOWER VASCULAR RIGHT SAPHENOFEMORAL JUNCTION COMPRESS: NORMAL
BH CV XLRA - RV BASE: 4.2 CM
BH CV XLRA - TDI S': 11 CM/SEC
BILIRUB UR QL STRIP: NEGATIVE
BUN SERPL-MCNC: 26.7 MG/DL (ref 8–23)
BUN/CREAT SERPL: 14.4 (ref 7–25)
CALCIUM SPEC-SCNC: 8.7 MG/DL (ref 8.6–10.5)
CHLORIDE SERPL-SCNC: 104 MMOL/L (ref 98–107)
CHOLEST SERPL-MCNC: 105 MG/DL (ref 0–200)
CLARITY UR: CLEAR
CO2 SERPL-SCNC: 18.9 MMOL/L (ref 22–29)
COLOR UR: YELLOW
CREAT SERPL-MCNC: 1.86 MG/DL (ref 0.76–1.27)
CRP SERPL-MCNC: 9.65 MG/DL (ref 0–0.5)
D-LACTATE SERPL-SCNC: 4 MMOL/L (ref 0.5–2)
D-LACTATE SERPL-SCNC: 4.7 MMOL/L (ref 0.5–2)
D-LACTATE SERPL-SCNC: 4.9 MMOL/L (ref 0.5–2)
DEPRECATED RDW RBC AUTO: 57.7 FL (ref 37–54)
EGFRCR SERPLBLD CKD-EPI 2021: 35.5 ML/MIN/1.73
EOSINOPHIL # BLD AUTO: 1.42 10*3/MM3 (ref 0–0.4)
EOSINOPHIL NFR BLD AUTO: 2.9 % (ref 0.3–6.2)
ERYTHROCYTE [DISTWIDTH] IN BLOOD BY AUTOMATED COUNT: 17.2 % (ref 12.3–15.4)
ERYTHROCYTE [SEDIMENTATION RATE] IN BLOOD: 31 MM/HR (ref 0–20)
FOLATE SERPL-MCNC: 3.28 NG/ML (ref 4.78–24.2)
GLUCOSE SERPL-MCNC: 104 MG/DL (ref 65–99)
GLUCOSE UR STRIP-MCNC: NEGATIVE MG/DL
HBA1C MFR BLD: 5.36 % (ref 4.8–5.6)
HCT VFR BLD AUTO: 34 % (ref 37.5–51)
HDLC SERPL-MCNC: 28 MG/DL (ref 40–60)
HGB BLD-MCNC: 10.5 G/DL (ref 13–17.7)
HGB UR QL STRIP.AUTO: NEGATIVE
HYALINE CASTS UR QL AUTO: ABNORMAL /LPF
IMM GRANULOCYTES # BLD AUTO: 1.13 10*3/MM3 (ref 0–0.05)
IMM GRANULOCYTES NFR BLD AUTO: 2.3 % (ref 0–0.5)
IVRT: 77 MS
KETONES UR QL STRIP: ABNORMAL
L PNEUMO1 AG UR QL IA: NEGATIVE
LDH SERPL-CCNC: 465 U/L (ref 135–225)
LDLC SERPL CALC-MCNC: 52 MG/DL (ref 0–100)
LDLC/HDLC SERPL: 1.74 {RATIO}
LEUKOCYTE ESTERASE UR QL STRIP.AUTO: NEGATIVE
LV EF BIPLANE MOD: 46.3 %
LYMPHOCYTES # BLD AUTO: 3.17 10*3/MM3 (ref 0.7–3.1)
LYMPHOCYTES NFR BLD AUTO: 6.5 % (ref 19.6–45.3)
MCH RBC QN AUTO: 28.8 PG (ref 26.6–33)
MCHC RBC AUTO-ENTMCNC: 30.9 G/DL (ref 31.5–35.7)
MCV RBC AUTO: 93.4 FL (ref 79–97)
MONOCYTES # BLD AUTO: 2.06 10*3/MM3 (ref 0.1–0.9)
MONOCYTES NFR BLD AUTO: 4.2 % (ref 5–12)
MRSA DNA SPEC QL NAA+PROBE: NORMAL
NEUTROPHILS NFR BLD AUTO: 40.62 10*3/MM3 (ref 1.7–7)
NEUTROPHILS NFR BLD AUTO: 83.7 % (ref 42.7–76)
NITRITE UR QL STRIP: NEGATIVE
NRBC BLD AUTO-RTO: 0 /100 WBC (ref 0–0.2)
PH UR STRIP.AUTO: 5.5 [PH] (ref 5–8)
PLATELET # BLD AUTO: 312 10*3/MM3 (ref 140–450)
PMV BLD AUTO: 9.5 FL (ref 6–12)
POTASSIUM SERPL-SCNC: 3.3 MMOL/L (ref 3.5–5.2)
PROCALCITONIN SERPL-MCNC: 0.3 NG/ML (ref 0–0.25)
PROT UR QL STRIP: ABNORMAL
QT INTERVAL: 439 MS
QTC INTERVAL: 595 MS
RBC # BLD AUTO: 3.64 10*6/MM3 (ref 4.14–5.8)
RBC # UR STRIP: ABNORMAL /HPF
REF LAB TEST METHOD: ABNORMAL
S PNEUM AG SPEC QL LA: NEGATIVE
SINUS: 3.1 CM
SODIUM SERPL-SCNC: 137 MMOL/L (ref 136–145)
SP GR UR STRIP: 1.02 (ref 1–1.03)
SQUAMOUS #/AREA URNS HPF: ABNORMAL /HPF
TRIGL SERPL-MCNC: 141 MG/DL (ref 0–150)
URATE SERPL-MCNC: 8.4 MG/DL (ref 3.4–7)
UROBILINOGEN UR QL STRIP: ABNORMAL
VIT B12 BLD-MCNC: 618 PG/ML (ref 211–946)
VLDLC SERPL-MCNC: 25 MG/DL (ref 5–40)
WBC # UR STRIP: ABNORMAL /HPF
WBC NRBC COR # BLD AUTO: 48.57 10*3/MM3 (ref 3.4–10.8)

## 2025-06-25 PROCEDURE — 99222 1ST HOSP IP/OBS MODERATE 55: CPT

## 2025-06-25 PROCEDURE — 85652 RBC SED RATE AUTOMATED: CPT

## 2025-06-25 PROCEDURE — 83605 ASSAY OF LACTIC ACID: CPT

## 2025-06-25 PROCEDURE — 81001 URINALYSIS AUTO W/SCOPE: CPT

## 2025-06-25 PROCEDURE — 80061 LIPID PANEL: CPT

## 2025-06-25 PROCEDURE — 83036 HEMOGLOBIN GLYCOSYLATED A1C: CPT

## 2025-06-25 PROCEDURE — 93970 EXTREMITY STUDY: CPT | Performed by: SURGERY

## 2025-06-25 PROCEDURE — 94761 N-INVAS EAR/PLS OXIMETRY MLT: CPT

## 2025-06-25 PROCEDURE — 86140 C-REACTIVE PROTEIN: CPT

## 2025-06-25 PROCEDURE — 25010000002 ENOXAPARIN PER 10 MG

## 2025-06-25 PROCEDURE — 94799 UNLISTED PULMONARY SVC/PX: CPT

## 2025-06-25 PROCEDURE — 93306 TTE W/DOPPLER COMPLETE: CPT

## 2025-06-25 PROCEDURE — 84145 PROCALCITONIN (PCT): CPT | Performed by: INTERNAL MEDICINE

## 2025-06-25 PROCEDURE — 74176 CT ABD & PELVIS W/O CONTRAST: CPT

## 2025-06-25 PROCEDURE — 94664 DEMO&/EVAL PT USE INHALER: CPT

## 2025-06-25 PROCEDURE — 84550 ASSAY OF BLOOD/URIC ACID: CPT

## 2025-06-25 PROCEDURE — 76775 US EXAM ABDO BACK WALL LIM: CPT

## 2025-06-25 PROCEDURE — 99222 1ST HOSP IP/OBS MODERATE 55: CPT | Performed by: STUDENT IN AN ORGANIZED HEALTH CARE EDUCATION/TRAINING PROGRAM

## 2025-06-25 PROCEDURE — 70450 CT HEAD/BRAIN W/O DYE: CPT

## 2025-06-25 PROCEDURE — 83605 ASSAY OF LACTIC ACID: CPT | Performed by: INTERNAL MEDICINE

## 2025-06-25 PROCEDURE — 85025 COMPLETE CBC W/AUTO DIFF WBC: CPT

## 2025-06-25 PROCEDURE — 97166 OT EVAL MOD COMPLEX 45 MIN: CPT | Performed by: OCCUPATIONAL THERAPIST

## 2025-06-25 PROCEDURE — 93306 TTE W/DOPPLER COMPLETE: CPT | Performed by: INTERNAL MEDICINE

## 2025-06-25 PROCEDURE — 87899 AGENT NOS ASSAY W/OPTIC: CPT

## 2025-06-25 PROCEDURE — 93970 EXTREMITY STUDY: CPT

## 2025-06-25 PROCEDURE — 80048 BASIC METABOLIC PNL TOTAL CA: CPT

## 2025-06-25 PROCEDURE — 25010000002 METHYLPREDNISOLONE PER 125 MG: Performed by: INTERNAL MEDICINE

## 2025-06-25 PROCEDURE — 83615 LACTATE (LD) (LDH) ENZYME: CPT | Performed by: PHYSICIAN ASSISTANT

## 2025-06-25 PROCEDURE — 87641 MR-STAPH DNA AMP PROBE: CPT

## 2025-06-25 PROCEDURE — 71250 CT THORAX DX C-: CPT

## 2025-06-25 PROCEDURE — 25810000003 SODIUM CHLORIDE 0.9 % SOLUTION

## 2025-06-25 PROCEDURE — 94640 AIRWAY INHALATION TREATMENT: CPT

## 2025-06-25 PROCEDURE — 25010000002 PIPERACILLIN SOD-TAZOBACTAM PER 1 G

## 2025-06-25 PROCEDURE — 87449 NOS EACH ORGANISM AG IA: CPT

## 2025-06-25 PROCEDURE — 97162 PT EVAL MOD COMPLEX 30 MIN: CPT

## 2025-06-25 RX ORDER — METHYLPREDNISOLONE SODIUM SUCCINATE 125 MG/2ML
60 INJECTION, POWDER, LYOPHILIZED, FOR SOLUTION INTRAMUSCULAR; INTRAVENOUS EVERY 12 HOURS
Status: DISCONTINUED | OUTPATIENT
Start: 2025-06-25 | End: 2025-06-27

## 2025-06-25 RX ORDER — POLYETHYLENE GLYCOL 3350 17 G/17G
17 POWDER, FOR SOLUTION ORAL DAILY
Status: DISCONTINUED | OUTPATIENT
Start: 2025-06-25 | End: 2025-07-01 | Stop reason: HOSPADM

## 2025-06-25 RX ORDER — POLYETHYLENE GLYCOL 3350 17 G/17G
17 POWDER, FOR SOLUTION ORAL DAILY PRN
Status: DISCONTINUED | OUTPATIENT
Start: 2025-06-25 | End: 2025-07-01 | Stop reason: HOSPADM

## 2025-06-25 RX ORDER — BISACODYL 10 MG
10 SUPPOSITORY, RECTAL RECTAL DAILY PRN
Status: DISCONTINUED | OUTPATIENT
Start: 2025-06-25 | End: 2025-07-01 | Stop reason: HOSPADM

## 2025-06-25 RX ORDER — BUDESONIDE 0.5 MG/2ML
0.5 INHALANT ORAL
Status: DISCONTINUED | OUTPATIENT
Start: 2025-06-25 | End: 2025-07-01 | Stop reason: HOSPADM

## 2025-06-25 RX ORDER — NICOTINE 21 MG/24HR
1 PATCH, TRANSDERMAL 24 HOURS TRANSDERMAL DAILY
Status: DISCONTINUED | OUTPATIENT
Start: 2025-06-25 | End: 2025-07-01 | Stop reason: HOSPADM

## 2025-06-25 RX ORDER — IPRATROPIUM BROMIDE AND ALBUTEROL SULFATE 2.5; .5 MG/3ML; MG/3ML
3 SOLUTION RESPIRATORY (INHALATION) EVERY 4 HOURS PRN
Status: DISCONTINUED | OUTPATIENT
Start: 2025-06-25 | End: 2025-07-01 | Stop reason: HOSPADM

## 2025-06-25 RX ORDER — IPRATROPIUM BROMIDE AND ALBUTEROL SULFATE 2.5; .5 MG/3ML; MG/3ML
3 SOLUTION RESPIRATORY (INHALATION) 3 TIMES DAILY
Status: DISCONTINUED | OUTPATIENT
Start: 2025-06-25 | End: 2025-07-01 | Stop reason: HOSPADM

## 2025-06-25 RX ORDER — BISACODYL 5 MG/1
5 TABLET, DELAYED RELEASE ORAL DAILY PRN
Status: DISCONTINUED | OUTPATIENT
Start: 2025-06-25 | End: 2025-07-01 | Stop reason: HOSPADM

## 2025-06-25 RX ORDER — POTASSIUM CHLORIDE 1500 MG/1
40 TABLET, EXTENDED RELEASE ORAL ONCE
Status: COMPLETED | OUTPATIENT
Start: 2025-06-25 | End: 2025-06-25

## 2025-06-25 RX ORDER — AMOXICILLIN 250 MG
2 CAPSULE ORAL 2 TIMES DAILY
Status: DISCONTINUED | OUTPATIENT
Start: 2025-06-25 | End: 2025-07-01 | Stop reason: HOSPADM

## 2025-06-25 RX ADMIN — ACETAMINOPHEN 650 MG: 325 TABLET, FILM COATED ORAL at 18:09

## 2025-06-25 RX ADMIN — BUDESONIDE 0.5 MG: 0.5 SUSPENSION RESPIRATORY (INHALATION) at 20:50

## 2025-06-25 RX ADMIN — IPRATROPIUM BROMIDE AND ALBUTEROL SULFATE 3 ML: .5; 3 SOLUTION RESPIRATORY (INHALATION) at 20:45

## 2025-06-25 RX ADMIN — SENNOSIDES AND DOCUSATE SODIUM 2 TABLET: 50; 8.6 TABLET ORAL at 14:18

## 2025-06-25 RX ADMIN — SODIUM CHLORIDE 1000 ML: 9 INJECTION, SOLUTION INTRAVENOUS at 01:21

## 2025-06-25 RX ADMIN — PANTOPRAZOLE SODIUM 40 MG: 40 TABLET, DELAYED RELEASE ORAL at 06:18

## 2025-06-25 RX ADMIN — HYDROCODONE BITARTRATE AND ACETAMINOPHEN 1 TABLET: 10; 325 TABLET ORAL at 21:15

## 2025-06-25 RX ADMIN — Medication 12.5 MG: at 21:15

## 2025-06-25 RX ADMIN — PIPERACILLIN AND TAZOBACTAM 3.38 G: 3; .375 INJECTION, POWDER, FOR SOLUTION INTRAVENOUS at 17:22

## 2025-06-25 RX ADMIN — Medication 10 ML: at 21:16

## 2025-06-25 RX ADMIN — IPRATROPIUM BROMIDE AND ALBUTEROL SULFATE 3 ML: .5; 3 SOLUTION RESPIRATORY (INHALATION) at 14:45

## 2025-06-25 RX ADMIN — SENNOSIDES AND DOCUSATE SODIUM 2 TABLET: 50; 8.6 TABLET ORAL at 21:15

## 2025-06-25 RX ADMIN — NICOTINE 1 PATCH: 21 PATCH, EXTENDED RELEASE TRANSDERMAL at 18:09

## 2025-06-25 RX ADMIN — METHYLPREDNISOLONE SODIUM SUCCINATE 60 MG: 125 INJECTION, POWDER, FOR SOLUTION INTRAMUSCULAR; INTRAVENOUS at 14:18

## 2025-06-25 RX ADMIN — Medication 12.5 MG: at 09:57

## 2025-06-25 RX ADMIN — HYDROCODONE BITARTRATE AND ACETAMINOPHEN 1 TABLET: 10; 325 TABLET ORAL at 14:18

## 2025-06-25 RX ADMIN — PIPERACILLIN AND TAZOBACTAM 3.38 G: 3; .375 INJECTION, POWDER, FOR SOLUTION INTRAVENOUS at 09:57

## 2025-06-25 RX ADMIN — ENOXAPARIN SODIUM 60 MG: 100 INJECTION SUBCUTANEOUS at 01:22

## 2025-06-25 RX ADMIN — PIPERACILLIN AND TAZOBACTAM 3.38 G: 3; .375 INJECTION, POWDER, FOR SOLUTION INTRAVENOUS at 01:21

## 2025-06-25 RX ADMIN — POTASSIUM CHLORIDE 40 MEQ: 1500 TABLET, EXTENDED RELEASE ORAL at 01:28

## 2025-06-25 RX ADMIN — Medication 10 ML: at 09:57

## 2025-06-25 NOTE — THERAPY EVALUATION
Patient Name: Jeremias Negro  : 1940    MRN: [id number]                              Today's Date: 2025       Admit Date: 2025    Visit Dx:     ICD-10-CM ICD-9-CM   1. Weakness  R53.1 780.79   2. Leukocytosis, unspecified type  D72.829 288.60   3. Acute kidney injury  N17.9 584.9     Patient Active Problem List   Diagnosis    Hypertension    Hyperlipidemia    Coronary artery disease    Chronic renal insufficiency    Hypokalemia    Leukocytosis    Elevated lactic acid level    Anemia    SUZETTE (acute kidney injury)    Sepsis, unspecified organism    Weakness    Constipation    Falls frequently    Dizziness    Hard of hearing    Body mass index (BMI) of 19.0 to 19.9 in adult    Cigarette smoker    CKD (chronic kidney disease) stage 3, GFR 30-59 ml/min    Atrial fibrillation    Left hip pain     Past Medical History:   Diagnosis Date    Coronary artery disease     Hyperlipidemia     Hypertension      History reviewed. No pertinent surgical history.   General Information       Row Name 25 1434          OT Time and Intention    Document Type evaluation  -DT     Mode of Treatment occupational therapy  -DT     Patient Effort good  -DT     Symptoms Noted During/After Treatment shortness of breath  -DT       Row Name 25 1434          General Information    Patient Profile Reviewed yes  -DT     Prior Level of Function independent:;all household mobility;ADL's  Pt primarily sponge bathes, walks holding onto furniture vs using his walker, cooks using microwave only & drives occasionally even though family does not want him driving. His dtr picks up groceries for him & cooks meals for him sometimes.  -DT     Existing Precautions/Restrictions fall  -DT     Barriers to Rehab hearing deficit;previous functional deficit;cognitive status  -DT       Row Name 25 1434          Living Environment    Current Living Arrangements home  -DT     People in Home alone  -DT       Row Name 25 1434           Home Main Entrance    Number of Stairs, Main Entrance three  -DT     Stair Railings, Main Entrance railings safe and in good condition  -DT       Row Name 06/25/25 1434          Stairs Within Home, Primary    Number of Stairs, Within Home, Primary none  -DT       Row Name 06/25/25 1434          Cognition    Orientation Status (Cognition) oriented x 4;other (see comments)  not oriented to POTUS.  Short Blessed Test performed scoring 17/28 indicating impairment consistent with dementia. Pt also unable to recall #911 when asked what number to call for emergencies.  -DT       Row Name 06/25/25 1434          Safety Issues/Impairments Affecting Functional Mobility    Safety Issues Affecting Function (Mobility) awareness of need for assistance;impulsivity;insight into deficits/self-awareness;judgment;positioning of assistive device;problem-solving;safety precaution awareness;safety precautions follow-through/compliance;sequencing abilities;at risk behavior observed  -DT     Impairments Affecting Function (Mobility) balance;cognition;endurance/activity tolerance;motor control;pain;range of motion (ROM);shortness of breath;strength  -DT     Cognitive Impairments, Mobility Safety/Performance attention;awareness, need for assistance;impulsivity;insight into deficits/self-awareness;judgment;problem-solving/reasoning;safety precaution awareness;safety precaution follow-through;sequencing abilities  -DT               User Key  (r) = Recorded By, (t) = Taken By, (c) = Cosigned By      Initials Name Provider Type    DT Portia Sánchez, OT Occupational Therapist                     Mobility/ADL's       Row Name 06/25/25 1443          Bed Mobility    Bed Mobility bed mobility (all) activities  -DT     All Activities, [place] (Bed Mobility) not tested  -DT     Comment, (Bed Mobility) Pt sitting up in recliner.  -DT       Row Name 06/25/25 1443          Transfers    Transfers sit-stand transfer;stand-sit transfer  -DT        Row Name 06/25/25 1443          Sit-Stand Transfer    Sit-Stand [place] (Transfers) contact guard;verbal cues;minimum assist (75% patient effort)  -DT     Assistive Device (Sit-Stand Transfers) walker, front-wheeled  -DT     Comment, (Sit-Stand Transfer) v.c fo r safety awareness using RW  -DT       Row Name 06/25/25 1443          Stand-Sit Transfer    Stand-Sit [place] (Transfers) contact guard;verbal cues  -DT       Row Name 06/25/25 1443          Functional Mobility    Functional Mobility- Ind. Level minimum assist (75% patient effort);verbal cues required  -DT     Functional Mobility- Device walker, front-wheeled  -DT     Functional Mobility- Comment v.c. for safety awareness, proper use of RW. Pt impulsive & laughing when educated or re-directed for safety.  -DT     Patient was able to Ambulate yes  -DT       Row Name 06/25/25 1443          Activities of Daily Living    BADL Assessment/Intervention lower body dressing;grooming;toileting  -DT       Row Name 06/25/25 1443          Lower Body Dressing Assessment/Training    [place] Level (Lower Body Dressing) lower body dressing skills;minimum assist (75% patient effort)  -DT     Position (Lower Body Dressing) supported sitting  -DT       Row Name 06/25/25 1443          Grooming Assessment/Training    [place] Level (Grooming) grooming skills;independent  -DT       Row Name 06/25/25 1443          Toileting Assessment/Training    [place] Level (Toileting) toileting skills;minimum assist (75% patient effort);verbal cues  -DT     Comment, (Toileting) Pt reports frequent incontinence & unable to make it to bathroom in time at home.  -DT               User Key  (r) = Recorded By, (t) = Taken By, (c) = Cosigned By      Initials Name Provider Type    DT Portia Sánchez, OT Occupational Therapist                   Obj/Interventions       Row Name 06/25/25 1448          Range of Motion Comprehensive    General Range of Motion bilateral  upper extremity ROM WFL  -DT       Row Name 06/25/25 1448          Strength Comprehensive (MMT)    General Manual Muscle Testing (MMT) Assessment upper extremity strength deficits identified  -DT     Comment, General Manual Muscle Testing (MMT) Assessment bilateral shoulder flex = 3/5; elbow & grasp = 4-/5  -DT       Row Name 06/25/25 1448          Motor Skills    Motor Skills functional endurance  -DT     Functional Endurance fair  -DT       Row Name 06/25/25 1448          Balance    Balance Assessment sitting static balance;sitting dynamic balance;standing static balance;standing dynamic balance  -DT     Static Sitting Balance independent  -DT     Dynamic Sitting Balance supervision  -DT     Static Standing Balance contact guard  -DT     Dynamic Standing Balance minimal assist  -DT     Position/Device Used, Standing Balance walker, front-wheeled  -DT               User Key  (r) = Recorded By, (t) = Taken By, (c) = Cosigned By      Initials Name Provider Type    DT Portia Snáchez, OT Occupational Therapist                   Goals/Plan       Row Name 06/25/25 1451          Transfer Goal 1 (OT)    Activity/Assistive Device (Transfer Goal 1, OT) transfers, all  -DT     [place] Level/Cues Needed (Transfer Goal 1, OT) supervision required  -DT     Time Frame (Transfer Goal 1, OT) long term goal (LTG);2 weeks  -DT       Row Name 06/25/25 1451          Bathing Goal 1 (OT)    Activity/Device (Bathing Goal 1, OT) bathing skills, all  -DT     [place] Level/Cues Needed (Bathing Goal 1, OT) modified independence  -DT     Time Frame (Bathing Goal 1, OT) long term goal (LTG);2 weeks  -DT       Row Name 06/25/25 1451          Dressing Goal 1 (OT)    Activity/Device (Dressing Goal 1, OT) dressing skills, all;lower body dressing  -DT     [place]/Cues Needed (Dressing Goal 1, OT) modified independence  -DT     Time Frame (Dressing Goal 1, OT) long term goal (LTG);2 weeks  -DT       Row Name 06/25/25 1791           Toileting Goal 1 (OT)    Activity/Device (Toileting Goal 1, OT) toileting skills, all  -DT     [place] Level/Cues Needed (Toileting Goal 1, OT) modified independence  -DT     Time Frame (Toileting Goal 1, OT) 2 weeks;long term goal (LTG)  -DT       Row Name 06/25/25 1459          Therapy Assessment/Plan (OT)    Planned Therapy Interventions (OT) activity tolerance training;BADL retraining;cognitive/visual perception retraining;functional balance retraining;neuromuscular control/coordination retraining;occupation/activity based interventions;ROM/therapeutic exercise;patient/caregiver education/training;strengthening exercise;transfer/mobility retraining  -DT               User Key  (r) = Recorded By, (t) = Taken By, (c) = Cosigned By      Initials Name Provider Type    DT Portia Sánchez, OT Occupational Therapist                   Clinical Impression       Row Name 06/25/25 1442          Pain Assessment    Pain Location abdomen;buttock  -DT     Pain Side/Orientation left  -DT     Pain Management Interventions positioning techniques utilized  -DT     Response to Pain Interventions activity participation with tolerable pain  -DT     Additional Documentation Pain Scale: FACES Pre/Post-Treatment (Group)  -DT       Row Name 06/25/25 6515          Pain Scale: FACES Pre/Post-Treatment    Pain: FACES Scale, Pretreatment 2-->hurts little bit  -DT     Posttreatment Pain Rating 2-->hurts little bit  -DT       Row Name 06/25/25 5323          Plan of Care Review    Outcome Evaluation Pt is an 84 y.o. male with pmh of CAD s/p CABG, HTN, HLD, current smoker who presented to the ER per instructions of his PCP after having abnormal blood work.  Outpatient labs noted a WBC of 42,000 and he was instructed to come to the hospital for a new onset leukemia workup.  Pt reports intermittent nausea, decreased urination and constipation along with dizziness resulting in multiple falls. Pt also admits to non-compliance with  his medicine regimen at home. At baseline, pt lives alone in [facility] with 3 PITER. He states he primarily sponge bathes, walks holding onto furniture vs using his walker, cooks using microwave only & drives occasionally even though he states his family does not want him driving. His dtr picks up groceries for him & cooks meals for him sometimes. Pt also states he is frequently incontinent at home. Upon eval, pt is A&O X4. He was unable to name POTUS & the Short Blessed Test was performed with pt scoring 17/28 indicating impairment consistent with dementia. Pt also unable to recall #911 when asked what number to call for emergencies. Pt requiring min A for LB ADLs & ambulation using a RW. He requires v.c. for safety awareness & frequently laughs when attempting to educate or redirect pt for safety concerns. Pt has generalized weakness, dec activity tolerance & standing balance, dec left ankle AROM/strength & bilateral shoulder strength, impaired cognition & is requiring inc assist & v.c. for safe mobility & ADL performance. Pt seems unsafe to return home alone & is at a high risk for falls. OT will continue to follow for tx & recommends SNF upon discharge.  -DT       Row Name 06/25/25 1449          Therapy Assessment/Plan (OT)    Rehab Potential (OT) good  -DT     Criteria for Skilled Therapeutic Interventions Met (OT) yes;meets criteria;skilled treatment is necessary  -DT     Therapy Frequency (OT) 5 times/wk  -DT     Predicted Duration of Therapy Intervention (OT) until d/c  -DT       Row Name 06/25/25 1447          Therapy Plan Review/Discharge Plan (OT)    Anticipated Discharge Disposition (OT) skilled nursing facility  -DT       Row Name 06/25/25 1449          Vital Signs    Pre Systolic BP Rehab 134  -DT     Pre Treatment Diastolic BP 52  -DT     Pretreatment Heart Rate (beats/min) 102  -DT     Intratreatment Heart Rate (beats/min) 112  -DT     Pretreatment Resp Rate (breaths/min) 15  -DT     O2 Delivery Pre  Treatment room air  -DT     O2 Delivery Intra Treatment room air  -DT     Post SpO2 (%) 95  -DT     O2 Delivery Post Treatment room air  -DT       Row Name 06/25/25 1449          Positioning and Restraints    Pre-Treatment Position sitting in chair/recliner  -DT     Post Treatment Position chair  -DT     In Chair notified nsg;sitting;call light within reach;encouraged to call for assist;exit alarm on;legs elevated  -DT               User Key  (r) = Recorded By, (t) = Taken By, (c) = Cosigned By      Initials Name Provider Type    DT Portia Sánchez, OT Occupational Therapist                   Outcome Measures       Row Name 06/25/25 0800 06/25/25 0400       How much help from another person do you currently need...    Turning from your back to your side while in flat bed without using bedrails? 4  -BC 4  -HP    Moving from lying on back to sitting on the side of a flat bed without bedrails? 4  -BC 4  -HP    Moving to and from a bed to a chair (including a wheelchair)? 3  -BC 3  -HP    Standing up from a chair using your arms (e.g., wheelchair, bedside chair)? 3  -BC 3  -HP    Climbing 3-5 steps with a railing? 3  -BC 3  -HP    To walk in hospital room? 3  -BC 3  -HP    AM-PAC 6 Clicks Score (PT) 20  -BC 20  -HP              User Key  (r) = Recorded By, (t) = Taken By, (c) = Cosigned By      Initials Name Provider Type    BC Catrachita Ferrari RN Registered Nurse     Sheri Hannon LPN Licensed Nurse                    Occupational Therapy Education       Title: PT OT SLP Therapies (In Progress)       Topic: Occupational Therapy (In Progress)       Point: ADL training (Done)       Learning Progress Summary            Patient Acceptance, E,TB,D, VU,NR by DT at 6/25/2025 1452    Comment: role of OT, goals & POC, safety prec.                      Point: Precautions (Done)       Learning Progress Summary            Patient Acceptance, E,TB,D, VU,NR by DT at 6/25/2025 1452    Comment: role of OT, goals & POC,  safety prec.                      Point: Body mechanics (Done)       Learning Progress Summary            Patient Acceptance, E,TB,D, VU,NR by DT at 6/25/2025 2209    Comment: role of OT, goals & POC, safety prec.                                      User Key       Initials Effective Dates Name Provider Type Discipline    DT 07/11/23 -  Portia Sánchez, OT Occupational Therapist OT                  OT Recommendation and Plan  Planned Therapy Interventions (OT): activity tolerance training, BADL retraining, cognitive/visual perception retraining, functional balance retraining, neuromuscular control/coordination retraining, occupation/activity based interventions, ROM/therapeutic exercise, patient/caregiver education/training, strengthening exercise, transfer/mobility retraining  Therapy Frequency (OT): 5 times/wk  Plan of Care Review  Outcome Evaluation: Pt is an 84 y.o. male with pmh of CAD s/p CABG, HTN, HLD, current smoker who presented to the ER per instructions of his PCP after having abnormal blood work.  Outpatient labs noted a WBC of 42,000 and he was instructed to come to the hospital for a new onset leukemia workup.  Pt reports intermittent nausea, decreased urination and constipation along with dizziness resulting in multiple falls. Pt also admits to non-compliance with his medicine regimen at home. At baseline, pt lives alone in [facility] with 3 PITER. He states he primarily sponge bathes, walks holding onto furniture vs using his walker, cooks using microwave only & drives occasionally even though he states his family does not want him driving. His dtr picks up groceries for him & cooks meals for him sometimes. Pt also states he is frequently incontinent at home. Upon eval, pt is A&O X4. He was unable to name POTUS & the Short Blessed Test was performed with pt scoring 17/28 indicating impairment consistent with dementia. Pt also unable to recall #911 when asked what number to call for emergencies. Pt  requiring min A for LB ADLs & ambulation using a RW. He requires v.c. for safety awareness & frequently laughs when attempting to educate or redirect pt for safety concerns. Pt has generalized weakness, dec activity tolerance & standing balance, dec left ankle AROM/strength & bilateral shoulder strength, impaired cognition & is requiring inc assist & v.c. for safe mobility & ADL performance. Pt seems unsafe to return home alone & is at a high risk for falls. OT will continue to follow for tx & recommends SNF upon discharge.     Time Calculation:         Time Calculation- OT       Row Name 06/25/25 1501             Time Calculation- OT    OT Start Time 1302  -DT      OT Stop Time 1331  -DT      OT Time Calculation (min) 29 min  -DT      OT Received On 06/25/25  -DT      OT - Next Appointment 06/26/25  -DT      OT Goal Re-Cert Due Date 07/09/25  -DT                User Key  (r) = Recorded By, (t) = Taken By, (c) = Cosigned By      Initials Name Provider Type    Portia Quintana, OT Occupational Therapist                           Portia Sánchez OT  6/25/2025

## 2025-06-25 NOTE — CONSULTS
Patient Name: Jeremias Negro  YOB: 1940  MRN: [id number]  Admission date: 6/24/2025  Reason for Encounter: MST 2-3 or Nursing Admission Screen    [facility] Clinical Nutrition Assessment       Severe chronic disease or condition related malnutrition related to prolonged suboptimal intake and suspected hypermetabolism in the setting of multiple chronic conditions including CKD, anemia, CAD, leukocytosis as evidenced by po intake meeting < 75% est energy requirement for > 1 month, 2-3+ edema present, and moderate-severe muscle and fat wasting per NFPE. (See MSA)    RD to add Magic Cups at lunch/dinner (Provides 580 kcals, 18 g protein if consumed)       Subjective    Subjective Information     6/25: Pt presented to ED on 6/24 after being sent from PCP for abnormal white blood count. Pt reports that he was instructed to report to ED for work-up for possible leukemia. Pt reports generalized weakness, low back pain and left hip. Pt also reports intermittent nausea, decreased urination and constipation along with dizziness resulting in multiple falls. RD visited pt at bedside. Pt was laying in bed at time of visit. Pt reports that he is not eating much currently. Pt reports that is appetite has been poor. Pt did attempt to answer my questions but did seem to have some confusion as well with not all answers being appropriate to questions asked. NFPE completed, consistent with nutrition diagnosis of malnutrition using AND/ASPEN criteria. See MSA below.        Assessment    H&P and Current Problems      H&P  Past Medical History:   Diagnosis Date    Coronary artery disease     Hyperlipidemia     Hypertension       History reviewed. No pertinent surgical history.   Current Problems   Admission Diagnosis:  Weakness [R53.1]  Acute kidney injury [N17.9]  Leukocytosis, unspecified type [D72.829]  SUZETTE (acute kidney injury) [N17.9]    Problem List:    SUZETTE (acute kidney injury)    Hypertension    Hyperlipidemia     "Coronary artery disease    Hypokalemia    Leukocytosis    Elevated lactic acid level    Anemia    Sepsis, unspecified organism    Weakness    Constipation    Falls frequently    Dizziness    Hard of hearing    Body mass index (BMI) of 19.0 to 19.9 in adult    Cigarette smoker    CKD (chronic kidney disease) stage 3, GFR 30-59 ml/min    Atrial fibrillation    Left hip pain      Other Applicable Nutrition Information:   Reported poor po intake     Anthropometrics      BMI, Height, Weight Estimated body mass index is 19.33 kg/m² as calculated from the following:    Height as of this encounter: 177.8 cm (70\").    Weight as of this encounter: 61.1 kg (134 lb 11.2 oz).    Weight Method: Bed scale       Trending Weight Changes 6/25: 134#    Current weight c/w reported weight of 133# in January 2025.        Weight History  Wt Readings from Last 20 Encounters:   06/25/25 0421 61.1 kg (134 lb 11.2 oz)   06/24/25 2025 59.6 kg (131 lb 6.3 oz)   06/24/25 1604 59 kg (130 lb)   09/03/19 1317 74.8 kg (165 lb)   02/19/19 1359 86.2 kg (190 lb)   07/31/18 1229 84.6 kg (186 lb 8 oz)   11/28/17 1409 79.8 kg (176 lb)   05/16/17 1244 84.5 kg (186 lb 6.1 oz)   10/11/16 1524 73.1 kg (161 lb 4 oz)   03/22/16 1406 92.5 kg (204 lb)        Labs      Comment: 6/25: Hypokalemia - replaced today      Results from last 7 days   Lab Units 06/25/25  0342 06/25/25  0000 06/24/25  1954 06/24/25  1724 06/24/25  1720   SODIUM mmol/L  --  137  --   --  140   POTASSIUM mmol/L  --  3.3*  --   --  3.3*   GLUCOSE mg/dL  --  104*  --   --  108*   BUN mg/dL  --  26.7*  --   --  28.8*   CREATININE mg/dL  --  1.86*  --   --  2.03*   CALCIUM mg/dL  --  8.7  --   --  9.0   MAGNESIUM mg/dL  --   --   --   --  2.3   ALBUMIN g/dL  --   --   --   --  2.9*   CRP mg/dL  --  9.65*  --   --   --    LACTATE mmol/L 4.0* 4.9* 3.4*   < >  --    BILIRUBIN mg/dL  --   --   --   --  0.7   ALK PHOS U/L  --   --   --   --  124*   AST (SGOT) U/L  --   --   --   --  23   ALT (SGPT) " "U/L  --   --   --   --  6    < > = values in this interval not displayed.     Results from last 7 days   Lab Units 06/25/25  0000 06/24/25  1720   PLATELETS 10*3/mm3 312 315   HEMOGLOBIN g/dL 10.5* 9.9*   HEMATOCRIT % 34.0* 32.1*   IRON mcg/dL  --  38*     No results found for: \"HGBA1C\"   Results from last 7 days   Lab Units 06/25/25  0000   URIC ACID mg/dL 8.4*     Medications       Scheduled Medications [Held by provider] amLODIPine, 5 mg, Oral, Q24H  [Held by provider] citalopram, 40 mg, Oral, Daily  [Held by provider] cloNIDine, 0.2 mg, Oral, Q24H  enoxaparin sodium, 1 mg/kg, Subcutaneous, Q24H  metoprolol tartrate, 12.5 mg, Oral, Q12H  nicotine, 1 patch, Transdermal, Daily  pantoprazole, 40 mg, Oral, Q AM  piperacillin-tazobactam, 3.375 g, Intravenous, Q8H  sodium chloride, 10 mL, Intravenous, Q12H        Infusions Pharmacy to Dose enoxaparin (LOVENOX),   Pharmacy To Dose:,   sodium chloride, 100 mL/hr, Last Rate: 100 mL/hr (06/25/25 0158)        PRN Medications   acetaminophen    senna-docusate sodium **AND** polyethylene glycol **AND** bisacodyl **AND** bisacodyl    Calcium Replacement - Follow Nurse / BPA Driven Protocol    HYDROcodone-acetaminophen    ipratropium-albuterol    Magnesium Standard Dose Replacement - Follow Nurse / BPA Driven Protocol    nitroglycerin    ondansetron ODT **OR** ondansetron    Pharmacy to Dose enoxaparin (LOVENOX)    Pharmacy To Dose:    Phosphorus Replacement - Follow Nurse / BPA Driven Protocol    Potassium Replacement - Follow Nurse / BPA Driven Protocol    [COMPLETED] Insert Peripheral IV **AND** sodium chloride    sodium chloride    sodium chloride     Physical Findings      Chewing/Swallowing  Teeth Status: Mouth/Teeth WDL: .WDL except, teeth Teeth Symptoms: tooth/teeth missing  Chewing/Swallowing Issues: No issues identified at this time   Edema            Leg, Left Edema: 1+ (Trace) Leg, Right Edema: 1+ (Trace)  Ankle, Left Edema: 2+ (Mild) Ankle, Right Edema: 2+ " (Mild)  Foot, Left Edema: 3+ (Moderate) Foot, Right Edema: 3+ (Moderate)   Bowel Function  Stool Output  Stool Unmeasured Occurrence: 1 (06/25/25 0700)  Bowel Incontinence: No (06/25/25 0700)  Stool Amount: Small (06/25/25 0700)  Perineal Care: perineal spray bottle/warm water use encouraged, perineum cleansed (06/25/25 0400)  Last Bowel Movement:  (unknown per patient)   I/Os  Intake & Output (last 3 days)         06/22 0701 06/23 0700 06/23 0701 06/24 0700 06/24 0701 06/25 0700 06/25 0701 06/26 0700    P.O.    240    Total Intake(mL/kg)    240 (3.9)    Urine (mL/kg/hr)   300     Stool   0     Total Output   300     Net   -300 +240            Urine Unmeasured Occurrence   1 x     Stool Unmeasured Occurrence   1 x            Lines, Drains, Airways, & Wounds       Active LDAs       Name Placement date Placement time Site Days Last dressing change    Peripheral IV 06/24/25 1722 20 G Left Antecubital 06/24/25  1722  Antecubital  less than 1 06/24/25 1722 (17.41 hrs)    Peripheral IV 06/24/25 Right Antecubital 06/24/25  --  Antecubital  1                       Nutrition Focused Physical Exam     Trending NFPE 6/25:NFPE completed, consistent with nutrition diagnosis of malnutrition using AND/ASPEN criteria. See MSA below.        Malnutrition Severity Assessment         Malnutrition Severity Assessment      Patient meets criteria for : Severe Malnutrition  Malnutrition Type (Last 8 Hours)       Malnutrition Severity Assessment       Row Name 06/25/25 1240       Malnutrition Severity Assessment    Malnutrition Type Chronic Disease - Related Malnutrition      Row Name 06/25/25 1240       Insufficient Energy Intake     Insufficient Energy Intake Findings Severe    Insufficient Energy Intake  <75% of est. energy requirement for > or equal to 1 month      Row Name 06/25/25 1240       Muscle Loss    Loss of Muscle Mass Findings Severe    Yazdanism Region Severe - deep hollowing/scooping, lack of muscle to touch, facial bones  well defined    Clavicle Bone Region Severe - protruding prominent bone    Acromion Bone Region Severe - squared shoulders, bones, and acromion process protrusion prominent    Scapular Bone Region Severe - prominent bones, depressions easily visible between ribs, scapula, spine, shoulders    Dorsal Hand Region Severe - prominent depression      Row Name 06/25/25 1240       Fat Loss    Subcutaneous Fat Loss Findings Moderate    Orbital Region  Moderate -  somewhat hollowness, slightly dark circles    Upper Arm Region Moderate - some fat tissue, not ample    Thoracic & Lumbar Region Severe - ribs visible with prominent depressions, iliac crest very prominent      Row Name 06/25/25 1240       Fluid Accumulation (Edema)    Fluid Acumulation Findings Severe    Fluid Accumulation  Severe equals 3+ or 4+ pitting edema      Row Name 06/25/25 1240       Criteria Met (Must meet criteria for severity in at least 2 of these categories: M Wasting, Fat Loss, Fluid, Secondary Signs, Wt. Status, Intake)    Patient meets criteria for  Severe Malnutrition                            (1)   Current Nutrition Orders & Evaluation of Intake      Oral Nutrition     Food Allergies  and Intolerances NKFA   Current PO Diet Diet: Regular/House; Fluid Consistency: Thin (IDDSI 0)   Oral Nutrition Supplement None     Trending % PO Intake 6/25: 0% intake x 1 meal documented    (2)  Assessment & Plan   Nutrition Diagnosis and Goals       Nutrition Diagnosis 1 Severe chronic disease or condition related malnutrition related to prolonged suboptimal intake and suspected hypermetabolism in the setting of multiple chronic conditions including CKD, anemia, CAD, leukocytosis as evidenced by po intake meeting < 75% est energy requirement for > 1 month, 2-3+ edema present, and moderate-severe muscle and fat wasting per NFPE. (See MSA)      Nutrition Diagnosis 2         Goal(s) Average Meal Intake at Least 75%, Maintain PO Intake , Meets Estimated Needs ,  Tolerates PO Diet , and Weight goal of trending towards IBW     Nutrition Intervention and Prescription       Intervention  Start oral nutrition supplement and Continue with current interventions NFPE completed       Diet Prescription Regular diet     Supplement Prescription Magic Cups at lunch/dinner (Provides 580 kcals, 18 g protein if consumed)       Education Provided  none   (3)  Monitoring/Evaluation       Monitor/Evaluation Per Protocol, I&O, PO Intake, Oral Nutrition Supplement Intake, and Pertinent Labs     RD Follow-Up Encounter 3-5 days     Electronically signed by:  Melany Wilde RD  06/25/25 10:46 EDT

## 2025-06-25 NOTE — THERAPY EVALUATION
Patient Name: Jeremias Negro  : 1940    MRN: [id number]                              Today's Date: 2025       Admit Date: 2025    Visit Dx:     ICD-10-CM ICD-9-CM   1. Weakness  R53.1 780.79   2. Leukocytosis, unspecified type  D72.829 288.60   3. Acute kidney injury  N17.9 584.9     Patient Active Problem List   Diagnosis    Hypertension    Hyperlipidemia    Coronary artery disease    Chronic renal insufficiency    Hypokalemia    Leukocytosis    Elevated lactic acid level    Anemia    SUZETTE (acute kidney injury)    Sepsis, unspecified organism    Weakness    Constipation    Falls frequently    Dizziness    Hard of hearing    Body mass index (BMI) of 19.0 to 19.9 in adult    Cigarette smoker    CKD (chronic kidney disease) stage 3, GFR 30-59 ml/min    Atrial fibrillation    Left hip pain     Past Medical History:   Diagnosis Date    Coronary artery disease     Hyperlipidemia     Hypertension      History reviewed. No pertinent surgical history.   General Information       Row Name 25 1544          Physical Therapy Time and Intention    Document Type evaluation  -CM     Mode of Treatment physical therapy  -       Row Name 25 1544          General Information    Patient Profile Reviewed yes  -CM     Prior Level of Function independent:;all household mobility;gait  reports he mostly uses furniture to amb around home; has rw but does not use; dtr helps get groceries  -CM     Existing Precautions/Restrictions fall  -CM     Barriers to Rehab cognitive status;previous functional deficit;medically complex  -CM       Row Name 25 1544          Living Environment    Current Living Arrangements home  -CM     People in Home alone  -CM       Row Name 25 1544          Home Main Entrance    Number of Stairs, Main Entrance three  -CM     Stair Railings, Main Entrance railings safe and in good condition  -CM       Row Name 25 1544          Stairs Within Home, Primary    Number of  "Stairs, Within Home, Primary none  -CM       Row Name 06/25/25 1544          Cognition    Orientation Status (Cognition) oriented x 3;disoriented to;person;verbal cues/prompts needed for orientation  needed multiple cues to VANGIE, then after stating it was Kamron Hendrix, stated that VANGIE \"played basketball for [employer] when he was in college!\"  -CM       Row Name 06/25/25 1544          Safety Issues/Impairments Affecting Functional Mobility    Safety Issues Affecting Function (Mobility) at risk behavior observed;awareness of need for assistance;friction/shear risk;impulsivity;safety precaution awareness;problem-solving;positioning of assistive device;safety precautions follow-through/compliance;judgment;insight into deficits/self-awareness  -CM     Impairments Affecting Function (Mobility) balance;cognition;endurance/activity tolerance;motor control;pain;range of motion (ROM);shortness of breath;strength  -CM               User Key  (r) = Recorded By, (t) = Taken By, (c) = Cosigned By      Initials Name Provider Type    CM Bertha Cazares, PT Physical Therapist                   Mobility       Row Name 06/25/25 1549          Bed Mobility    Bed Mobility bed mobility (all) activities  -CM     All Activities, [place] (Bed Mobility) not tested  -CM     Comment, (Bed Mobility) in chair when PT arrived.  -CM       Row Name 06/25/25 1549          Sit-Stand Transfer    Sit-Stand [place] (Transfers) contact guard;verbal cues;minimum assist (75% patient effort)  -CM     Assistive Device (Sit-Stand Transfers) walker, front-wheeled  -CM     Comment, (Sit-Stand Transfer) impulsive movements  -CM       Row Name 06/25/25 1546          Gait/Stairs (Locomotion)    [place] Level (Gait) minimum assist (75% patient effort);2 person assist;1 person to manage equipment  -CM     Assistive Device (Gait) walker, front-wheeled  -CM     Patient was able to Ambulate yes  -CM     Distance in Feet (Gait) 60  -CM     " Deviations/Abnormal Patterns (Gait) bilateral deviations;other (see comments)  impulsive gait; picking up rw multiple times and swinging it forward and high in air to advance it, despite multiple cues to keep rw on ground.  -CM     Left Sided Gait Deviations foot drop/toe drag  steppage gait pattern for LLE  -CM               User Key  (r) = Recorded By, (t) = Taken By, (c) = Cosigned By      Initials Name Provider Type    CM Bertha Cazares, PT Physical Therapist                   Obj/Interventions       Row Name 06/25/25 4958          Range of Motion Comprehensive    General Range of Motion bilateral lower extremity ROM WFL  -CM       Row Name 06/25/25 1554          Strength Comprehensive (MMT)    General Manual Muscle Testing (MMT) Assessment lower extremity strength deficits identified  -CM     Comment, General Manual Muscle Testing (MMT) Assessment B hips 3/5, other LE mm groups 3+/5 except for L DF, which is 0/5  -CM       Row Name 06/25/25 5028          Balance    Balance Assessment sitting static balance;sitting dynamic balance;standing static balance;standing dynamic balance  -CM     Static Sitting Balance independent  -CM     Dynamic Sitting Balance supervision  -CM     Position, Sitting Balance sitting in chair  -CM     Static Standing Balance contact guard  -CM     Dynamic Standing Balance minimal assist  -CM     Position/Device Used, Standing Balance supported;walker, rolling  -CM       Row Name 06/25/25 7704          Sensory Assessment (Somatosensory)    Sensory Assessment (Somatosensory) unable/difficult to assess  -CM               User Key  (r) = Recorded By, (t) = Taken By, (c) = Cosigned By      Initials Name Provider Type    Bertha Chavarria, PT Physical Therapist                   Goals/Plan       Row Name 06/25/25 1602          Bed Mobility Goal 1 (PT)    Activity/Assistive Device (Bed Mobility Goal 1, PT) bed mobility activities, all  -CM     [place] Level/Cues Needed (Bed Mobility  Goal 1, PT) supervision required  -CM     Time Frame (Bed Mobility Goal 1, PT) 2 weeks  -CM       Row Name 06/25/25 1602          Transfer Goal 1 (PT)    Activity/Assistive Device (Transfer Goal 1, PT) transfers, all;walker, rolling  -CM     [place] Level/Cues Needed (Transfer Goal 1, PT) modified independence  -CM     Time Frame (Transfer Goal 1, PT) 2 weeks  -CM       Row Name 06/25/25 1602          Gait Training Goal 1 (PT)    Activity/Assistive Device (Gait Training Goal 1, PT) gait (walking locomotion);walker, rolling  -CM     [place] Level (Gait Training Goal 1, PT) supervision required  -CM     Distance (Gait Training Goal 1, PT) 100 ft w/ proper use of rw  -CM     Time Frame (Gait Training Goal 1, PT) 2 weeks  -CM       Row Name 06/25/25 1602          Therapy Assessment/Plan (PT)    Planned Therapy Interventions (PT) balance training;bed mobility training;gait training;home exercise program;postural re-education;patient/family education;transfer training;strengthening;neuromuscular re-education;motor coordination training;ROM (range of motion)  -CM               User Key  (r) = Recorded By, (t) = Taken By, (c) = Cosigned By      Initials Name Provider Type    CM Bertha Cazares, PT Physical Therapist                   Clinical Impression       Row Name 06/25/25 0636          Pain    Pretreatment Pain Rating 10/10  -CM     Posttreatment Pain Rating 10/10  -CM     Pain Location extremity;hip  -CM     Pain Side/Orientation left;lower  -CM     Pain Management Interventions activity modification encouraged;exercise or physical activity utilized  -CM     Response to Pain Interventions activity participation with tolerable pain  -CM     Pre/Posttreatment Pain Comment no evidence of pain at rest; pt smiling and conversing well  -CM       Row Name 06/25/25 0692          Plan of Care Review    Plan of Care Reviewed With patient  -CM     Outcome Evaluation 83 yo male adm 6/24/25 for weakness, SUZETTE, sepsis.  Currently being worked up for possible leukemia 2* OP labs showing WBC of 42,000. Pt has had multiple falls recently, primarily due to loss of balance per pt. Upon further questioning, has also been falling due to tripping on LLE, as well as LE weakness. PMH: cad, [place], smoker, ckd3, a fib. At baseline, pt lives alone in single level home w/ 3 stairs to enter. Pt reports furniture walking around his apartment. Pt reports that he drives but that his family does not want him to. Has rw, but reports he does not use it. Today, pt shows confusion upon exam. He is extremely impulsive and demonstrates risky behavior w/ mobility, including having difficulty following directions for safe gait using rw. He was able to amb 60 ft w/ min assist x 2, but had to be monitored very closely. He has chronic foot drop for the L foot. Pt is very unsafe and very high risk for injurious falls. Not safe for home at d/c. Will need SNF at d/c. Will follow.  -CM       Row Name 06/25/25 1601          Therapy Assessment/Plan (PT)    Rehab Potential (PT) good  -CM     Criteria for Skilled Interventions Met (PT) yes;meets criteria;skilled treatment is necessary  -CM     Therapy Frequency (PT) 5 times/wk  -CM     Predicted Duration of Therapy Intervention (PT) until d/c  -CM       Row Name 06/25/25 1601          Vital Signs    Pre Systolic BP Rehab 134  -CM     Pre Treatment Diastolic BP 52  -CM     Pretreatment Heart Rate (beats/min) 102  -CM     Intratreatment Heart Rate (beats/min) 112  -CM     Pretreatment Resp Rate (breaths/min) 15  -CM     O2 Delivery Pre Treatment room air  -CM     O2 Delivery Intra Treatment room air  -CM     Post SpO2 (%) 95  -CM     O2 Delivery Post Treatment room air  -CM     Recovery Time VSS  -CM       Row Name 06/25/25 1601          Positioning and Restraints    Pre-Treatment Position sitting in chair/recliner  -CM     Post Treatment Position chair  -CM     In Chair notified nsg;sitting;call light within  reach;encouraged to call for assist;exit alarm on;with OT  -CM               User Key  (r) = Recorded By, (t) = Taken By, (c) = Cosigned By      Initials Name Provider Type    Bertha Chavarria, PT Physical Therapist                   Outcome Measures       Row Name 06/25/25 1603 06/25/25 0800       How much help from another person do you currently need...    Turning from your back to your side while in flat bed without using bedrails? 4  -CM 4  -BC    Moving from lying on back to sitting on the side of a flat bed without bedrails? 4  -CM 4  -BC    Moving to and from a bed to a chair (including a wheelchair)? 3  -CM 3  -BC    Standing up from a chair using your arms (e.g., wheelchair, bedside chair)? 3  -CM 3  -BC    Climbing 3-5 steps with a railing? 2  -CM 3  -BC    To walk in hospital room? 3  -CM 3  -BC    AM-PAC 6 Clicks Score (PT) 19  -CM 20  -BC              User Key  (r) = Recorded By, (t) = Taken By, (c) = Cosigned By      Initials Name Provider Type    Bertha Chavarria, PT Physical Therapist    BC Catrachita Ferrari RN Registered Nurse                                 Physical Therapy Education       Title: PT OT SLP Therapies (In Progress)       Topic: Physical Therapy (Done)       Point: Mobility training (Done)       Learning Progress Summary            Patient Acceptance, E,TB, VU,NR,NL by CM at 6/25/2025 1604                      Point: Home exercise program (Done)       Learning Progress Summary            Patient Acceptance, E,TB, VU,NR,NL by CM at 6/25/2025 1604                      Point: Body mechanics (Done)       Learning Progress Summary            Patient Acceptance, E,TB, VU,NR,NL by CM at 6/25/2025 1604                      Point: Precautions (Done)       Learning Progress Summary            Patient Acceptance, E,TB, VU,NR,NL by CM at 6/25/2025 1604                                      User Key       Initials Effective Dates Name Provider Type Sergio SIMMONS 06/16/21 -  Debora  Bertha ROSS, PT Physical Therapist PT                  PT Recommendation and Plan  Planned Therapy Interventions (PT): balance training, bed mobility training, gait training, home exercise program, postural re-education, patient/family education, transfer training, strengthening, neuromuscular re-education, motor coordination training, ROM (range of motion)  Outcome Evaluation: 83 yo male adm 6/24/25 for weakness, SUZETTE, sepsis. Currently being worked up for possible leukemia 2* OP labs showing WBC of 42,000. Pt has had multiple falls recently, primarily due to loss of balance per pt. Upon further questioning, has also been falling due to tripping on LLE, as well as LE weakness. PMH: cad, [place], smoker, ckd3, a fib. At baseline, pt lives alone in single level home w/ 3 stairs to enter. Pt reports furniture walking around his apartment. Pt reports that he drives but that his family does not want him to. Has rw, but reports he does not use it. Today, pt shows confusion upon exam. He is extremely impulsive and demonstrates risky behavior w/ mobility, including having difficulty following directions for safe gait using rw. He was able to amb 60 ft w/ min assist x 2, but had to be monitored very closely. He has chronic foot drop for the L foot. Pt is very unsafe and very high risk for injurious falls. Not safe for home at d/c. Will need SNF at d/c. Will follow.     Time Calculation:   PT Evaluation Complexity  Clinical Decision Making (PT Evaluation Complexity): moderate complexity     PT Charges       Row Name 06/25/25 1605             Time Calculation    Start Time 1255  -CM      Stop Time 1322  -CM      Time Calculation (min) 27 min  -CM      PT Received On 06/25/25  -CM      PT - Next Appointment 06/26/25  -CM      PT Goal Re-Cert Due Date 07/09/25  -CM         Time Calculation- PT    Total Timed Code Minutes- PT 0 minute(s)  -CM                User Key  (r) = Recorded By, (t) = Taken By, (c) = Cosigned By      Initials  Name Provider Type     Bertha Cazares, PT Physical Therapist                  Therapy Charges for Today       Code Description Service Date Service Provider Modifiers Qty    61937193903 HC PT EVAL MOD COMPLEXITY 4 6/25/2025 Bertha Cazares, PT GP 1            PT G-Codes  AM-PAC 6 Clicks Score (PT): 19  PT Discharge Summary  Anticipated Discharge Disposition (PT): skilled nursing facility    Bertha Cazares, PT  6/25/2025

## 2025-06-25 NOTE — CONSULTS
Hematology/Oncology Inpatient Consultation    Patient name: Jeremias Negro  : 1940  MRN: [id number]  Referring Provider: RAAD Gonzalez  Reason for Consultation: Metastatic malignancy, leukocytosis    Chief complaint: Weakness     History of present illness:    Jeremias Negro is a 84 y.o. male with past medical history significant for CAD s/p CABG, HTN, HLD, current smoker who presented to [facility] on 2025 after abnormal blood counts noted by PCP.  He was instructed to come to the hospital after having a white blood cell count of 42,000.  He states he has been feeling unwell for several weeks with generalized weakness, low back pain and left hip pain.  Reports intermittent nausea, decreased urination and constipation along with dizziness that has resulted in multiple falls.  He denies any loss of consciousness.  He does note his left lower leg and ankle have been swelling as well.  Patient reports he is a current smoker at about 1.5 packs/day.    He was treated for Covid-19 infection, exacerbation of likely underlying COPD, and SUZETTE in January at [facility] and had a normal WBC at that time. His weight in January was 133lbs.     In the ER, he was hypotensive with sBP in 80's upon arrival.  Sepsis fluid bolus given along with BC drawn and zosyn initated. BP noted improvement  WBC 48.42 with HGB of 9.9, HCT 32.1. Lactic 3.3. K 3.3 with 20 meq given.  Creatinine 2.03 with GFR 31.9, alk phose 124, albumin 2.9.  CXR was non specific with vague reticulonodular airspace opacities suspicious for infectious/inflammatory process.  Was admitted for further workup and management of leukocytosis, SUZETTE, weakness and possible sepsis.    2025 CT chest abdomen pelvis without contrast  Impression:  Please note this is a motion-degraded, noncontrast exam which makes assessment somewhat suboptimal. Within these confines, there is widely metastatic malignancy throughout the chest, abdomen, and  pelvis as summarized below. Recommend tissue sampling.     Chest:  - Bulky metastatic mediastinal lymphadenopathy.  - Large metastatic soft tissue implant in the right shoulder.  - Small metastatic implant in the epicardial fat.  - Indeterminate right upper lobe pulmonary nodule.  - Bibasilar bronchial wall thickening and mucoid impaction, suggestive of an infectious/inflammatory process. Areas of interlobular septal thickening may reflect mild pulmonary edema. Background of emphysematous and chronic changes of the lungs.     Abdomen/pelvis:  - Large mass replacing the left kidney.  - Innumerable metastatic implants in the peritoneum, bilateral retroperitoneum, and mesentery.  - Numerous metastatic intramuscular and soft tissue implants.  - Small volume pelvic ascites.    6/25/2025 CT head without contrast  Impression:   Atrophy and chronic microvascular ischemic change. No acute intracranial process. Right mastoiditis.     Patient underwent duplex ultrasound of bilateral lower extremities due to his leg swelling.  This showed no DVT.  There was an irregular, heterogeneous, predominantly hypoechoic, nonvascular echolucency and subcutaneous tissue of the left groin.    Renal ultrasound showed left renal mass measuring up to 4.2 x 5.3 by 5.4 cm with resulting mild left-sided hydronephrosis.    SUBJECTIVE:  06/25/25  Hematology/Oncology was consulted.patient seen today for initial evaluation. He appears to have somewhat limited understanding of his medical issues. He denied having any significant medical issues, however later admitted having increased urinary frequency over last few weeks and some ongoing constipation. Denied any weight loss, changes in appetite, progressive fatigue, abd pain, hematuria, diarrhea Nausea/vomiting etc. He reported being independent in his ADLs,lives by himself and is assisted by his son and daughter in some activities who live close to his home.    He/She  has a past medical history of  Coronary artery disease, Hyperlipidemia, and Hypertension.    PCP: Tomer Azevedo MD    History:  Past Medical History:   Diagnosis Date    Coronary artery disease     Hyperlipidemia     Hypertension    , History reviewed. No pertinent surgical history., History reviewed. No pertinent family history.,   Social History     Tobacco Use    Smoking status: Every Day     Current packs/day: 2.00     Average packs/day: 2.0 packs/day for 16.5 years (33.0 ttl pk-yrs)     Types: Cigarettes     Start date: 2009   Vaping Use    Vaping status: Never Used   Substance Use Topics    Alcohol use: No    Drug use: Never   ,   Medications Prior to Admission   Medication Sig Dispense Refill Last Dose/Taking    metoprolol tartrate (LOPRESSOR) 100 MG tablet METOPROLOL TARTRATE 100 MG TABS   6/23/2025 Evening    amLODIPine (NORVASC) 5 MG tablet Daily.   More than a month    citalopram (CeleXA) 40 MG tablet Take 1 tablet by mouth Daily.  12 More than a month    CloNIDine (CATAPRES) 0.2 MG tablet Daily.   More than a month    HYDROcodone-acetaminophen (NORCO)  MG per tablet Take 1 tablet by mouth 4 (Four) Times a Day.  0 More than a month    omeprazole (priLOSEC) 20 MG capsule OMEPRAZOLE 20 MG CPDR   More than a month    terazosin (HYTRIN) 2 MG capsule TERAZOSIN HCL 2 MG CAPS   More than a month   , Scheduled Meds:  [Held by provider] amLODIPine, 5 mg, Oral, Q24H  [Held by provider] citalopram, 40 mg, Oral, Daily  [Held by provider] cloNIDine, 0.2 mg, Oral, Q24H  enoxaparin sodium, 1 mg/kg, Subcutaneous, Q24H  metoprolol tartrate, 12.5 mg, Oral, Q12H  nicotine, 1 patch, Transdermal, Daily  pantoprazole, 40 mg, Oral, Q AM  piperacillin-tazobactam, 3.375 g, Intravenous, Q8H  sodium chloride, 10 mL, Intravenous, Q12H    , Continuous Infusions:  Pharmacy to Dose enoxaparin (LOVENOX),   Pharmacy To Dose:,   sodium chloride, 100 mL/hr, Last Rate: 100 mL/hr (06/25/25 0158)    , PRN Meds:    acetaminophen    senna-docusate sodium **AND**  "polyethylene glycol **AND** bisacodyl **AND** bisacodyl    Calcium Replacement - Follow Nurse / BPA Driven Protocol    HYDROcodone-acetaminophen    ipratropium-albuterol    Magnesium Standard Dose Replacement - Follow Nurse / BPA Driven Protocol    nitroglycerin    ondansetron ODT **OR** ondansetron    Pharmacy to Dose enoxaparin (LOVENOX)    Pharmacy To Dose:    Phosphorus Replacement - Follow Nurse / BPA Driven Protocol    Potassium Replacement - Follow Nurse / BPA Driven Protocol    [COMPLETED] Insert Peripheral IV **AND** sodium chloride    sodium chloride    sodium chloride   Allergies:  Patient has no known allergies.    Subjective     ROS:  Review of Systems   Constitutional:  Positive for fatigue.   Gastrointestinal:  Positive for constipation.   Genitourinary:  Positive for difficulty urinating and frequency.        Objective   Vital Signs:   /81 (BP Location: Left arm, Patient Position: Lying)   Pulse 102   Temp 98 °F (36.7 °C) (Axillary)   Resp 26   Ht 177.8 cm (70\")   Wt 61.1 kg (134 lb 11.2 oz)   SpO2 94%   BMI 19.33 kg/m²     Physical Exam: (performed by MD)  Physical Exam  Constitutional:       Appearance: Normal appearance. He is normal weight.   HENT:      Head: Normocephalic and atraumatic.      Right Ear: External ear normal.      Left Ear: External ear normal.      Nose: Nose normal.      Mouth/Throat:      Mouth: Mucous membranes are moist.      Pharynx: Oropharynx is clear.   Eyes:      Extraocular Movements: Extraocular movements intact.      Conjunctiva/sclera: Conjunctivae normal.      Pupils: Pupils are equal, round, and reactive to light.   Cardiovascular:      Rate and Rhythm: Normal rate.      Pulses: Normal pulses.   Pulmonary:      Effort: Pulmonary effort is normal.   Abdominal:      General: Abdomen is flat.      Palpations: Abdomen is soft.   Musculoskeletal:         General: Normal range of motion.      Cervical back: Normal range of motion and neck supple.   Skin:     " "General: Skin is warm.   Neurological:      Mental Status: He is alert.   Psychiatric:         Mood and Affect: Mood normal.         Behavior: Behavior normal.         Thought Content: Thought content normal.         Judgment: Judgment normal.         Results Review:  Lab Results (last 48 hours)       Procedure Component Value Units Date/Time    Procalcitonin [[id number]]  (Abnormal) Collected: 06/25/25 0000    Specimen: Blood from Arm, Right Updated: 06/25/25 0849     Procalcitonin 0.30 ng/mL     Narrative:      As a Marker for Sepsis (Non-Neonates):    1. <0.5 ng/mL represents a low risk of severe sepsis and/or septic shock.  2. >2 ng/mL represents a high risk of severe sepsis and/or septic shock.    As a Marker for Lower Respiratory Tract Infections that require antibiotic therapy:    PCT on Admission    Antibiotic Therapy       6-12 Hrs later    >0.5                Strongly Recommended  >0.25 - <0.5        Recommended   0.1 - 0.25          Discouraged              Remeasure/reassess PCT  <0.1                Strongly Discouraged     Remeasure/reassess PCT    As 28 day mortality risk marker: \"Change in Procalcitonin Result\" (>80% or <=80%) if Day 0 (or Day 1) and Day 4 values are available. Refer to http://www.[employer]-pct-calculator.com    Change in PCT <=80%  A decrease of PCT levels below or equal to 80% defines a positive change in PCT test result representing a higher risk for 28-day all-cause mortality of patients diagnosed with severe sepsis for septic shock.    Change in PCT >80%  A decrease of PCT levels of more than 80% defines a negative change in PCT result representing a lower risk for 28-day all-cause mortality of patients diagnosed with severe sepsis or septic shock.       STAT Lactic Acid, Reflex [[id number]]  (Abnormal) Collected: 06/25/25 0342    Specimen: Blood from Arm, Left Updated: 06/25/25 0413     Lactate 4.0 mmol/L     C-reactive Protein [[id number]]  (Abnormal) Collected: 06/25/25 0000    " Specimen: Blood from Arm, Right Updated: 06/25/25 0400     C-Reactive Protein 9.65 mg/dL     Legionella Antigen, Urine - Urine, Urine, Clean Catch [[id number]]  (Normal) Collected: 06/25/25 0336    Specimen: Urine, Clean Catch Updated: 06/25/25 0357     LEGIONELLA ANTIGEN, URINE Negative    S. Pneumo Ag Urine or CSF - Urine, Urine, Clean Catch [[id number]]  (Normal) Collected: 06/25/25 0336    Specimen: Urine, Clean Catch Updated: 06/25/25 0357     Strep Pneumo Ag Negative    Urinalysis, Microscopic Only - Urine, Clean Catch [[id number]]  (Abnormal) Collected: 06/25/25 0336    Specimen: Urine, Clean Catch Updated: 06/25/25 0349     RBC, UA 3-5 /HPF      WBC, UA 0-2 /HPF      Comment: Urine culture not indicated.        Bacteria, UA None Seen /HPF      Squamous Epithelial Cells, UA 0-2 /HPF      Hyaline Casts, UA 0-2 /LPF      Methodology Automated Microscopy    Urinalysis With Culture If Indicated - Urine, Clean Catch [[id number]]  (Abnormal) Collected: 06/25/25 0336    Specimen: Urine, Clean Catch Updated: 06/25/25 0345     Color, UA Yellow     Appearance, UA Clear     pH, UA 5.5     Specific Gravity, UA 1.023     Glucose, UA Negative     Ketones, UA Trace     Bilirubin, UA Negative     Blood, UA Negative     Protein,  mg/dL (2+)     Leuk Esterase, UA Negative     Nitrite, UA Negative     Urobilinogen, UA 2.0 E.U./dL    Narrative:      In absence of clinical symptoms, the presence of pyuria, bacteria, and/or nitrites on the urinalysis result does not correlate with infection.    MRSA Screen, PCR (Inpatient) - Swab, Nares [[id number]]  (Normal) Collected: 06/25/25 0146    Specimen: Swab from Nares Updated: 06/25/25 0310     MRSA PCR No MRSA Detected    Narrative:      The negative predictive value of this diagnostic test is high and should only be used to consider de-escalating anti-MRSA therapy. A positive result may indicate colonization with MRSA and must be correlated clinically.    Sedimentation Rate  [[id number]]  (Abnormal) Collected: 06/25/25 0000    Specimen: Blood from Arm, Right Updated: 06/25/25 0251     Sed Rate 31 mm/hr     STAT Lactic Acid, Reflex [[id number]]  (Abnormal) Collected: 06/25/25 0000    Specimen: Blood from Arm, Right Updated: 06/25/25 0044     Lactate 4.9 mmol/L     Basic Metabolic Panel [[id number]]  (Abnormal) Collected: 06/25/25 0000    Specimen: Blood from Arm, Right Updated: 06/25/25 0043     Glucose 104 mg/dL      BUN 26.7 mg/dL      Creatinine 1.86 mg/dL      Sodium 137 mmol/L      Potassium 3.3 mmol/L      Comment: Specimen hemolyzed.  Result may be falsely elevated.        Chloride 104 mmol/L      CO2 18.9 mmol/L      Calcium 8.7 mg/dL      BUN/Creatinine Ratio 14.4     Anion Gap 14.1 mmol/L      eGFR 35.5 mL/min/1.73     Narrative:      GFR Categories in Chronic Kidney Disease (CKD)              GFR Category          GFR (mL/min/1.73)    Interpretation  G1                    90 or greater        Normal or high (1)  G2                    60-89                Mild decrease (1)  G3a                   45-59                Mild to moderate decrease  G3b                   30-44                Moderate to severe decrease  G4                    15-29                Severe decrease  G5                    14 or less           Kidney failure    (1)In the absence of evidence of kidney disease, neither GFR category G1 or G2 fulfill the criteria for CKD.    eGFR calculation 2021 CKD-EPI creatinine equation, which does not include race as a factor    Uric Acid [[id number]]  (Abnormal) Collected: 06/25/25 0000    Specimen: Blood from Arm, Right Updated: 06/25/25 0034     Uric Acid 8.4 mg/dL     CBC & Differential [[id number]]  (Abnormal) Collected: 06/25/25 0000    Specimen: Blood from Arm, Right Updated: 06/25/25 0013    Narrative:      The following orders were created for panel order CBC & Differential.  Procedure                               Abnormality         Status                    "  ---------                               -----------         ------                     CBC Auto Differential[[id number]]        Abnormal            Final result               Scan Slide[[id number]]                                                                    Please view results for these tests on the individual orders.    CBC Auto Differential [[id number]]  (Abnormal) Collected: 06/25/25 0000    Specimen: Blood from Arm, Right Updated: 06/25/25 0013     WBC 48.57 10*3/mm3      RBC 3.64 10*6/mm3      Hemoglobin 10.5 g/dL      Hematocrit 34.0 %      MCV 93.4 fL      MCH 28.8 pg      MCHC 30.9 g/dL      RDW 17.2 %      RDW-SD 57.7 fl      MPV 9.5 fL      Platelets 312 10*3/mm3      Neutrophil % 83.7 %      Lymphocyte % 6.5 %      Monocyte % 4.2 %      Eosinophil % 2.9 %      Basophil % 0.4 %      Immature Grans % 2.3 %      Neutrophils, Absolute 40.62 10*3/mm3      Lymphocytes, Absolute 3.17 10*3/mm3      Monocytes, Absolute 2.06 10*3/mm3      Eosinophils, Absolute 1.42 10*3/mm3      Basophils, Absolute 0.17 10*3/mm3      Immature Grans, Absolute 1.13 10*3/mm3      nRBC 0.0 /100 WBC     Procalcitonin [[id number]]  (Abnormal) Collected: 06/24/25 2213    Specimen: Blood Updated: 06/24/25 2248     Procalcitonin 0.28 ng/mL     Narrative:      As a Marker for Sepsis (Non-Neonates):    1. <0.5 ng/mL represents a low risk of severe sepsis and/or septic shock.  2. >2 ng/mL represents a high risk of severe sepsis and/or septic shock.    As a Marker for Lower Respiratory Tract Infections that require antibiotic therapy:    PCT on Admission    Antibiotic Therapy       6-12 Hrs later    >0.5                Strongly Recommended  >0.25 - <0.5        Recommended   0.1 - 0.25          Discouraged              Remeasure/reassess PCT  <0.1                Strongly Discouraged     Remeasure/reassess PCT    As 28 day mortality risk marker: \"Change in Procalcitonin Result\" (>80% or <=80%) if Day 0 (or Day 1) and Day 4 values are " available. Refer to http://www.[facility]-pct-calculator.com    Change in PCT <=80%  A decrease of PCT levels below or equal to 80% defines a positive change in PCT test result representing a higher risk for 28-day all-cause mortality of patients diagnosed with severe sepsis for septic shock.    Change in PCT >80%  A decrease of PCT levels of more than 80% defines a negative change in PCT result representing a lower risk for 28-day all-cause mortality of patients diagnosed with severe sepsis or septic shock.       Iron Profile w/o Ferritin [[id number]]  (Abnormal) Collected: 06/24/25 1720    Specimen: Blood Updated: 06/24/25 2158     Iron 38 mcg/dL      Iron Saturation (TSAT) 19 %      Transferrin 137 mg/dL      TIBC 204 mcg/dL     Ferritin [[id number]]  (Abnormal) Collected: 06/24/25 1720    Specimen: Blood Updated: 06/24/25 2158     Ferritin 667.00 ng/mL     Narrative:      Results may be falsely decreased if patient taking Biotin.      Folate [[id number]] Collected: 06/24/25 1720    Specimen: Blood Updated: 06/24/25 2131    Vitamin B12 [[id number]] Collected: 06/24/25 1720    Specimen: Blood Updated: 06/24/25 2131    STAT Lactic Acid, Reflex [[id number]]  (Abnormal) Collected: 06/24/25 1954    Specimen: Blood Updated: 06/24/25 2017     Lactate 3.4 mmol/L     CBC & Differential [[id number]]  (Abnormal) Collected: 06/24/25 1720    Specimen: Blood Updated: 06/24/25 1801    Narrative:      The following orders were created for panel order CBC & Differential.  Procedure                               Abnormality         Status                     ---------                               -----------         ------                     CBC Auto Differential[[id number]]        Abnormal            Final result               Scan Slide[[id number]]                                       Final result                 Please view results for these tests on the individual orders.    CBC Auto Differential [[id number]]  (Abnormal) Collected:  06/24/25 1720    Specimen: Blood Updated: 06/24/25 1801     WBC 48.42 10*3/mm3      RBC 3.44 10*6/mm3      Hemoglobin 9.9 g/dL      Hematocrit 32.1 %      MCV 93.3 fL      MCH 28.8 pg      MCHC 30.8 g/dL      RDW 17.1 %      RDW-SD 57.1 fl      MPV 9.5 fL      Platelets 315 10*3/mm3     Narrative:      The previously reported component NRBC is no longer being reported. Previous result was 0.0 /100 WBC (Reference Range: 0.0-0.2 /100 WBC) on 6/24/2025 at 1737 EDT.    Scan Slide [[id number]] Collected: 06/24/25 1720    Specimen: Blood Updated: 06/24/25 1801     Scan Slide --     Comment: See Manual Differential Results       Manual Differential [[id number]]  (Abnormal) Collected: 06/24/25 1720    Specimen: Blood Updated: 06/24/25 1801     Neutrophil % 92.0 %      Lymphocyte % 4.0 %      Monocyte % 2.0 %      Eosinophil % 2.0 %      Neutrophils Absolute 44.55 10*3/mm3      Lymphocytes Absolute 1.94 10*3/mm3      Monocytes Absolute 0.97 10*3/mm3      Eosinophils Absolute 0.97 10*3/mm3      RBC Morphology Normal     WBC Morphology Normal     Platelet Estimate Adequate    Comprehensive Metabolic Panel [[id number]]  (Abnormal) Collected: 06/24/25 1720    Specimen: Blood Updated: 06/24/25 1757     Glucose 108 mg/dL      BUN 28.8 mg/dL      Creatinine 2.03 mg/dL      Sodium 140 mmol/L      Potassium 3.3 mmol/L      Chloride 105 mmol/L      CO2 20.0 mmol/L      Calcium 9.0 mg/dL      Total Protein 6.3 g/dL      Albumin 2.9 g/dL      ALT (SGPT) 6 U/L      AST (SGOT) 23 U/L      Alkaline Phosphatase 124 U/L      Total Bilirubin 0.7 mg/dL      Globulin 3.4 gm/dL      A/G Ratio 0.9 g/dL      BUN/Creatinine Ratio 14.2     Anion Gap 15.0 mmol/L      eGFR 31.9 mL/min/1.73     Narrative:      GFR Categories in Chronic Kidney Disease (CKD)              GFR Category          GFR (mL/min/1.73)    Interpretation  G1                    90 or greater        Normal or high (1)  G2                    60-89                Mild decrease (1)  G3a                    45-59                Mild to moderate decrease  G3b                   30-44                Moderate to severe decrease  G4                    15-29                Severe decrease  G5                    14 or less           Kidney failure    (1)In the absence of evidence of kidney disease, neither GFR category G1 or G2 fulfill the criteria for CKD.    eGFR calculation 2021 CKD-EPI creatinine equation, which does not include race as a factor    Magnesium [[id number]]  (Normal) Collected: 06/24/25 1720    Specimen: Blood Updated: 06/24/25 1757     Magnesium 2.3 mg/dL     CK [[id number]]  (Normal) Collected: 06/24/25 1720    Specimen: Blood Updated: 06/24/25 1757     Creatine Kinase 46 U/L     TSH Rfx On Abnormal To Free T4 [[id number]]  (Normal) Collected: 06/24/25 1720    Specimen: Blood Updated: 06/24/25 1757     TSH 1.490 uIU/mL     Extra Tubes [[id number]] Collected: 06/24/25 1720    Specimen: Blood Updated: 06/24/25 1730    Narrative:      The following orders were created for panel order Extra Tubes.  Procedure                               Abnormality         Status                     ---------                               -----------         ------                     Gold Top - SST[[id number]]                                   Final result               Light Blue Top[[id number]]                                   Final result                 Please view results for these tests on the individual orders.    Gold Top - SST [[id number]] Collected: 06/24/25 1720    Specimen: Blood Updated: 06/24/25 1730     Extra Tube Hold for add-ons.     Comment: Auto resulted.       Light Blue Top [[id number]] Collected: 06/24/25 1720    Specimen: Blood Updated: 06/24/25 1730     Extra Tube Hold for add-ons.     Comment: Auto resulted       POC Lactate [[id number]]  (Abnormal) Collected: 06/24/25 1724    Specimen: Blood Updated: 06/24/25 1726     Lactate 3.3 mmol/L      Comment: Serial Number: [id number]:   [id number]       Blood Culture - Blood, Arm, Left [[id number]] Collected: 06/24/25 1720    Specimen: Blood from Arm, Left Updated: 06/24/25 1725    Blood Culture - Blood, Hand, Left [[id number]] Collected: 06/24/25 1720    Specimen: Blood from Hand, Left Updated: 06/24/25 1725             Pending Results:     Imaging Reviewed:   CT Chest Without Contrast Diagnostic  Result Date: 6/25/2025  Impression: Please note this is a motion-degraded, noncontrast exam which makes assessment somewhat suboptimal. Within these confines, there is widely metastatic malignancy throughout the chest, abdomen, and pelvis as summarized below. Recommend tissue sampling. Chest: - Bulky metastatic mediastinal lymphadenopathy. - Large metastatic soft tissue implant in the right shoulder. - Small metastatic implant in the epicardial fat. - Indeterminate right upper lobe pulmonary nodule. - Bibasilar bronchial wall thickening and mucoid impaction, suggestive of an infectious/inflammatory process. Areas of interlobular septal thickening may reflect mild pulmonary edema. Background of emphysematous and chronic changes of the lungs. Abdomen/pelvis: - Large mass replacing the left kidney. - Innumerable metastatic implants in the peritoneum, bilateral retroperitoneum, and mesentery. - Numerous metastatic intramuscular and soft tissue implants. - Small volume pelvic ascites. Electronically Signed: Rodger Mckeon MD  6/25/2025 8:29 AM EDT  Workstation ID: [id number]    CT Abdomen Pelvis Without Contrast  Result Date: 6/25/2025  Impression: Please note this is a motion-degraded, noncontrast exam which makes assessment somewhat suboptimal. Within these confines, there is widely metastatic malignancy throughout the chest, abdomen, and pelvis as summarized below. Recommend tissue sampling. Chest: - Bulky metastatic mediastinal lymphadenopathy. - Large metastatic soft tissue implant in the right shoulder. - Small metastatic implant in the epicardial fat. -  Indeterminate right upper lobe pulmonary nodule. - Bibasilar bronchial wall thickening and mucoid impaction, suggestive of an infectious/inflammatory process. Areas of interlobular septal thickening may reflect mild pulmonary edema. Background of emphysematous and chronic changes of the lungs. Abdomen/pelvis: - Large mass replacing the left kidney. - Innumerable metastatic implants in the peritoneum, bilateral retroperitoneum, and mesentery. - Numerous metastatic intramuscular and soft tissue implants. - Small volume pelvic ascites. Electronically Signed: Rodger Mckeon MD  6/25/2025 8:29 AM EDT  Workstation ID: [id number]    CT Head Without Contrast  Result Date: 6/25/2025  Impression: Atrophy and chronic microvascular ischemic change. No acute intracranial process. Right mastoiditis. Electronically Signed: Mike García MD  6/25/2025 5:54 AM EDT  Workstation ID: [id number]    US Renal Bilateral  Result Date: 6/25/2025  Impression: Partially obstructing left renal collecting system from a left renal mass. Please correlate with CT abdomen/pelvis. Electronically Signed: Mike García MD  6/25/2025 3:43 AM EDT  Workstation ID: [id number]    XR Chest 1 View  Result Date: 6/24/2025  Impression: Vague reticulonodular airspace opacities suspicious for infectious/inflammatory process. Etiologies may include bronchiolitis, aspiration or developing bronchopneumonia. 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  -Reviewed these findings with patient, explained to him that the overall picture is concerning for metastati RCC.  Also discussed the importance of tissue diagnosis to consider treatment options.  - patient is agreeable to biopsy. Will request IR evaluation to consider biopsy of a peritoneal implant/soft tissue mass.  -Will consider MRI brain for RCC diagnosis.    Leukocytosis with neutrophilia:  -noted WBC count 48.5k on presentation with Left shift.   This is  Likely reactive to malignancy. Will check peripheral blood flow cytometry.  Low concern for primary hematologic neoplasm      Normocytic anemia  Folate Deficiency:  - Iron studies not consistent with iron deficiency.  Will assess for further nutritional deficiencies and hemolysis.  Continue to monitor blood counts.  - Folate deficiency noted. Recommend daily folic acid supplement.      Electronically signed by Lashanda Miller PA-C, 06/25/25    I have reviewed and confirmed the accuracy of the patient's history: Chief complaint, HPI, ROS, Subjective, and Past Family Social History as entered by the APRN/PA.  Pertinent changes have been made to the sections to reflect my personal evaluation and exam findings. Frank Jones MD 06/25/25       Thank you for this consult. We will be happy to follow along with you.

## 2025-06-25 NOTE — H&P
Patient Care Team:  Tomer Azevedo MD as PCP - General (Family Medicine)    Chief complaint abnormal labs    Subjective     Patient is a 84 y.o. male with pmh of CAD s/p CABG, HTN, HLD, current smoker who presented to the ER per instructions of his PCP after having abnormal blood work.  Outpatient labs noted a WBC of 42,000 and he was instructed to come to the hospital for a new onset leukemia workup.    He reports feeling unwell for several weeks.  He has a chronic smokers cough. Reports generalized weakness, low back pain and left hip without radiation down legs or loss of bowel or bladder. Reports intermittent nausea, decreased urination and constipation along with dizziness resulting in multiple falls. No sycope or loss of consciousness.  He has noted that his left lower leg and ankle has been swelling.  Hip pain is relieved by lying on the affected side and is aggravated by standing and walking.     Denies fever, chills, chest pain, shortness of breath and palpitations. He feels that he is losing weight but it appears stable since January.  Last documented weight was 2019 in Epic of 165 lbs. Reports he smoked 1.5 packs per day.    He lives alone and does not routinely take medications as prescribed as noted full bottles of medications at bedside. He does intermittently take 100mg of Metoprolol.    He was treated for Covid-19 infection, exacerbation of likely underlying COPD, and SUZETTE in January at [facility] and had a normal WBC at that time. His weight in January was 133lbs.    In the ER he was hypotensive with sBP in 80's upon arrival.  Sepsis fluid bolus given along with BC drawn and zosyn initated. BP noted improvement  WBC 48.42 with HGB of 9.9, HCT 32.1. Lactic 3.3. K 3.3 with 20 meq given.  Creatinine 2.03 with GFR 31.9, alk phose 124, albumin 2.9CXR was non specific with vague reticulonodular airspace opacities suspicious for infectious/inflammatory process. Etiologies may include bronchiolitis,  aspiration or developing bronchopneumonia.   He will be admitted for further workup of leukocytosis, baldomero, weakness, and sepsis.    Review of Systems   Constitutional:  Positive for activity change. Negative for chills and fever.   Respiratory:  Negative for chest tightness and shortness of breath.    Cardiovascular:  Positive for leg swelling. Negative for chest pain and palpitations.   Gastrointestinal:  Positive for constipation and nausea. Negative for diarrhea and vomiting.   Genitourinary:  Positive for decreased urine volume.   Musculoskeletal:  Positive for arthralgias and back pain.   Neurological:  Positive for dizziness, weakness and light-headedness. Negative for syncope.          History  Past Medical History:   Diagnosis Date    Coronary artery disease     Hyperlipidemia     Hypertension      History reviewed. No pertinent surgical history.  History reviewed. No pertinent family history.  Social History     Tobacco Use    Smoking status: Every Day     Current packs/day: 2.00     Average packs/day: 2.0 packs/day for 16.5 years (33.0 ttl pk-yrs)     Types: Cigarettes     Start date: 2009   Vaping Use    Vaping status: Never Used   Substance Use Topics    Alcohol use: No    Drug use: Never     Medications Prior to Admission   Medication Sig Dispense Refill Last Dose/Taking    metoprolol tartrate (LOPRESSOR) 100 MG tablet METOPROLOL TARTRATE 100 MG TABS   6/23/2025 Evening    amLODIPine (NORVASC) 5 MG tablet Daily.   More than a month    citalopram (CeleXA) 40 MG tablet Take 1 tablet by mouth Daily.  12 More than a month    CloNIDine (CATAPRES) 0.2 MG tablet Daily.   More than a month    HYDROcodone-acetaminophen (NORCO)  MG per tablet Take 1 tablet by mouth 4 (Four) Times a Day.  0 More than a month    omeprazole (priLOSEC) 20 MG capsule OMEPRAZOLE 20 MG CPDR   More than a month    terazosin (HYTRIN) 2 MG capsule TERAZOSIN HCL 2 MG CAPS   More than a month     Allergies:  Patient has no known  allergies.    Objective     Vital Signs  Temp:  [98 °F (36.7 °C)-98.9 °F (37.2 °C)] 98.5 °F (36.9 °C)  Heart Rate:  [102-117] 102  Resp:  [15-22] 15  BP: ()/(47-77) 133/77     Physical Exam:      General Appearance:    Alert, cooperative, in no acute distress, chronically ill appearing, hard of hearing   Head:    Normocephalic, without obvious abnormality, atraumatic   Eyes:            Lids and lashes normal, conjunctivae and sclerae normal, no   icterus, no pallor, corneas clear, PERRLA   Ears:    Ears appear intact with no abnormalities noted   Throat:   No oral lesions, no thrush, oral mucosa dry   Neck:   No adenopathy, supple, trachea midline, no thyromegaly, no   carotid bruit, no JVD   Lungs:     Scattered faint wheezing, with slight coarseness,respirations regular, even and                  unlabored, non-productive cough    Heart:    Irregular rhythm and normal rate, normal S1 and S2, no            murmur, no gallop, no rub, no click   Chest Wall:    Noted scar from CABG.   Abdomen:    Hypoactive bowel sounds, no masses, no organomegaly, soft        non-tender, non-distended, no guarding, no rebound                tenderness   Extremities:   Moves all extremities well, 2+ non-pitting LLE edema, no cyanosis, no             redness.  Left leg shortened by approximately 1 inch. Able to flex and extend hip without discomfort.   Pulses:   Pulses palpable and equal bilaterally   Skin:   No bleeding, bruising or rash, poor skin turgor   Lymph nodes:   No palpable adenopathy   Neurologic:   Cranial nerves 2 - 12 grossly intact, sensation intact, DTR       present and equal bilaterally       Results Review:     Imaging Results (Last 24 Hours)       Procedure Component Value Units Date/Time    CT Chest Without Contrast Diagnostic - In process [[id number]] Resulted: 06/25/25 0044     Updated: 06/25/25 0047    This result has not been signed. Information might be incomplete.      CT Abdomen Pelvis Without Contrast  - In process [[id number]] Resulted: 06/25/25 0044     Updated: 06/25/25 0047    This result has not been signed. Information might be incomplete.      CT Head Without Contrast - In process [[id number]] Resulted: 06/25/25 0044     Updated: 06/25/25 0047    This result has not been signed. Information might be incomplete.      XR Chest 1 View [[id number]] Collected: 06/24/25 1750     Updated: 06/24/25 1755    Narrative:      XR CHEST 1 VW    Date of Exam: 6/24/2025 5:27 PM EDT    Indication: General Weakness elevated white count possible leukemia    Comparison: Chest radiograph 10/9/2010    Findings:  Vague reticulonodular airspace opacities most significant in the left upper and left lower lobes. No overt edema, large effusion or pneumothorax. Cardiomegaly. Prior median sternotomy. Aortic atherosclerotic disease. Degenerative related osseous change.      Impression:      Impression:  Vague reticulonodular airspace opacities suspicious for infectious/inflammatory process. Etiologies may include bronchiolitis, aspiration or developing bronchopneumonia.      Electronically Signed: Andres Llanos MD    6/24/2025 5:53 PM EDT    Workstation ID: [id number]             Lab Results (last 24 hours)       Procedure Component Value Units Date/Time    MRSA Screen, PCR (Inpatient) - Swab, Nares [[id number]] Collected: 06/25/25 0146    Specimen: Swab from Nares Updated: 06/25/25 0149    STAT Lactic Acid, Reflex [[id number]]  (Abnormal) Collected: 06/25/25 0000    Specimen: Blood from Arm, Right Updated: 06/25/25 0044     Lactate 4.9 mmol/L     Basic Metabolic Panel [[id number]]  (Abnormal) Collected: 06/25/25 0000    Specimen: Blood from Arm, Right Updated: 06/25/25 0043     Glucose 104 mg/dL      BUN 26.7 mg/dL      Creatinine 1.86 mg/dL      Sodium 137 mmol/L      Potassium 3.3 mmol/L      Comment: Specimen hemolyzed.  Result may be falsely elevated.        Chloride 104 mmol/L      CO2 18.9 mmol/L      Calcium 8.7 mg/dL       BUN/Creatinine Ratio 14.4     Anion Gap 14.1 mmol/L      eGFR 35.5 mL/min/1.73     Narrative:      GFR Categories in Chronic Kidney Disease (CKD)              GFR Category          GFR (mL/min/1.73)    Interpretation  G1                    90 or greater        Normal or high (1)  G2                    60-89                Mild decrease (1)  G3a                   45-59                Mild to moderate decrease  G3b                   30-44                Moderate to severe decrease  G4                    15-29                Severe decrease  G5                    14 or less           Kidney failure    (1)In the absence of evidence of kidney disease, neither GFR category G1 or G2 fulfill the criteria for CKD.    eGFR calculation 2021 CKD-EPI creatinine equation, which does not include race as a factor    Uric Acid [[id number]]  (Abnormal) Collected: 06/25/25 0000    Specimen: Blood from Arm, Right Updated: 06/25/25 0034     Uric Acid 8.4 mg/dL     CBC & Differential [[id number]]  (Abnormal) Collected: 06/25/25 0000    Specimen: Blood from Arm, Right Updated: 06/25/25 0013    Narrative:      The following orders were created for panel order CBC & Differential.  Procedure                               Abnormality         Status                     ---------                               -----------         ------                     CBC Auto Differential[[id number]]        Abnormal            Final result               Scan Slide[[id number]]                                                                    Please view results for these tests on the individual orders.    CBC Auto Differential [[id number]]  (Abnormal) Collected: 06/25/25 0000    Specimen: Blood from Arm, Right Updated: 06/25/25 0013     WBC 48.57 10*3/mm3      RBC 3.64 10*6/mm3      Hemoglobin 10.5 g/dL      Hematocrit 34.0 %      MCV 93.4 fL      MCH 28.8 pg      MCHC 30.9 g/dL      RDW 17.2 %      RDW-SD 57.7 fl      MPV 9.5 fL      Platelets 312 10*3/mm3      " Neutrophil % 83.7 %      Lymphocyte % 6.5 %      Monocyte % 4.2 %      Eosinophil % 2.9 %      Basophil % 0.4 %      Immature Grans % 2.3 %      Neutrophils, Absolute 40.62 10*3/mm3      Lymphocytes, Absolute 3.17 10*3/mm3      Monocytes, Absolute 2.06 10*3/mm3      Eosinophils, Absolute 1.42 10*3/mm3      Basophils, Absolute 0.17 10*3/mm3      Immature Grans, Absolute 1.13 10*3/mm3      nRBC 0.0 /100 WBC     Procalcitonin [[id number]]  (Abnormal) Collected: 06/24/25 2213    Specimen: Blood Updated: 06/24/25 2248     Procalcitonin 0.28 ng/mL     Narrative:      As a Marker for Sepsis (Non-Neonates):    1. <0.5 ng/mL represents a low risk of severe sepsis and/or septic shock.  2. >2 ng/mL represents a high risk of severe sepsis and/or septic shock.    As a Marker for Lower Respiratory Tract Infections that require antibiotic therapy:    PCT on Admission    Antibiotic Therapy       6-12 Hrs later    >0.5                Strongly Recommended  >0.25 - <0.5        Recommended   0.1 - 0.25          Discouraged              Remeasure/reassess PCT  <0.1                Strongly Discouraged     Remeasure/reassess PCT    As 28 day mortality risk marker: \"Change in Procalcitonin Result\" (>80% or <=80%) if Day 0 (or Day 1) and Day 4 values are available. Refer to http://www.[facility]-pct-calculator.com    Change in PCT <=80%  A decrease of PCT levels below or equal to 80% defines a positive change in PCT test result representing a higher risk for 28-day all-cause mortality of patients diagnosed with severe sepsis for septic shock.    Change in PCT >80%  A decrease of PCT levels of more than 80% defines a negative change in PCT result representing a lower risk for 28-day all-cause mortality of patients diagnosed with severe sepsis or septic shock.       Iron Profile w/o Ferritin [[id number]]  (Abnormal) Collected: 06/24/25 1720    Specimen: Blood Updated: 06/24/25 2158     Iron 38 mcg/dL      Iron Saturation (TSAT) 19 %      " Transferrin 137 mg/dL      TIBC 204 mcg/dL     Ferritin [[id number]]  (Abnormal) Collected: 06/24/25 1720    Specimen: Blood Updated: 06/24/25 2158     Ferritin 667.00 ng/mL     Narrative:      Results may be falsely decreased if patient taking Biotin.      Folate [[id number]] Collected: 06/24/25 1720    Specimen: Blood Updated: 06/24/25 2131    Vitamin B12 [[id number]] Collected: 06/24/25 1720    Specimen: Blood Updated: 06/24/25 2131    STAT Lactic Acid, Reflex [[id number]]  (Abnormal) Collected: 06/24/25 1954    Specimen: Blood Updated: 06/24/25 2017     Lactate 3.4 mmol/L     CBC & Differential [[id number]]  (Abnormal) Collected: 06/24/25 1720    Specimen: Blood Updated: 06/24/25 1801    Narrative:      The following orders were created for panel order CBC & Differential.  Procedure                               Abnormality         Status                     ---------                               -----------         ------                     CBC Auto Differential[[id number]]        Abnormal            Final result               Scan Slide[[id number]]                                       Final result                 Please view results for these tests on the individual orders.    CBC Auto Differential [[id number]]  (Abnormal) Collected: 06/24/25 1720    Specimen: Blood Updated: 06/24/25 1801     WBC 48.42 10*3/mm3      RBC 3.44 10*6/mm3      Hemoglobin 9.9 g/dL      Hematocrit 32.1 %      MCV 93.3 fL      MCH 28.8 pg      MCHC 30.8 g/dL      RDW 17.1 %      RDW-SD 57.1 fl      MPV 9.5 fL      Platelets 315 10*3/mm3     Narrative:      The previously reported component NRBC is no longer being reported. Previous result was 0.0 /100 WBC (Reference Range: 0.0-0.2 /100 WBC) on 6/24/2025 at 1737 EDT.    Scan Slide [[id number]] Collected: 06/24/25 1720    Specimen: Blood Updated: 06/24/25 1801     Scan Slide --     Comment: See Manual Differential Results       Manual Differential [[id number]]  (Abnormal) Collected: 06/24/25  1720    Specimen: Blood Updated: 06/24/25 1801     Neutrophil % 92.0 %      Lymphocyte % 4.0 %      Monocyte % 2.0 %      Eosinophil % 2.0 %      Neutrophils Absolute 44.55 10*3/mm3      Lymphocytes Absolute 1.94 10*3/mm3      Monocytes Absolute 0.97 10*3/mm3      Eosinophils Absolute 0.97 10*3/mm3      RBC Morphology Normal     WBC Morphology Normal     Platelet Estimate Adequate    Comprehensive Metabolic Panel [[id number]]  (Abnormal) Collected: 06/24/25 1720    Specimen: Blood Updated: 06/24/25 1757     Glucose 108 mg/dL      BUN 28.8 mg/dL      Creatinine 2.03 mg/dL      Sodium 140 mmol/L      Potassium 3.3 mmol/L      Chloride 105 mmol/L      CO2 20.0 mmol/L      Calcium 9.0 mg/dL      Total Protein 6.3 g/dL      Albumin 2.9 g/dL      ALT (SGPT) 6 U/L      AST (SGOT) 23 U/L      Alkaline Phosphatase 124 U/L      Total Bilirubin 0.7 mg/dL      Globulin 3.4 gm/dL      A/G Ratio 0.9 g/dL      BUN/Creatinine Ratio 14.2     Anion Gap 15.0 mmol/L      eGFR 31.9 mL/min/1.73     Narrative:      GFR Categories in Chronic Kidney Disease (CKD)              GFR Category          GFR (mL/min/1.73)    Interpretation  G1                    90 or greater        Normal or high (1)  G2                    60-89                Mild decrease (1)  G3a                   45-59                Mild to moderate decrease  G3b                   30-44                Moderate to severe decrease  G4                    15-29                Severe decrease  G5                    14 or less           Kidney failure    (1)In the absence of evidence of kidney disease, neither GFR category G1 or G2 fulfill the criteria for CKD.    eGFR calculation 2021 CKD-EPI creatinine equation, which does not include race as a factor    Magnesium [[id number]]  (Normal) Collected: 06/24/25 1720    Specimen: Blood Updated: 06/24/25 1757     Magnesium 2.3 mg/dL     CK [[id number]]  (Normal) Collected: 06/24/25 1720    Specimen: Blood Updated: 06/24/25 1757      Creatine Kinase 46 U/L     TSH Rfx On Abnormal To Free T4 [[id number]]  (Normal) Collected: 06/24/25 1720    Specimen: Blood Updated: 06/24/25 1757     TSH 1.490 uIU/mL     Extra Tubes [[id number]] Collected: 06/24/25 1720    Specimen: Blood Updated: 06/24/25 1730    Narrative:      The following orders were created for panel order Extra Tubes.  Procedure                               Abnormality         Status                     ---------                               -----------         ------                     Gold Top - SST[[id number]]                                   Final result               Light Blue Top[[id number]]                                   Final result                 Please view results for these tests on the individual orders.    Gold Top - SST [[id number]] Collected: 06/24/25 1720    Specimen: Blood Updated: 06/24/25 1730     Extra Tube Hold for add-ons.     Comment: Auto resulted.       Light Blue Top [[id number]] Collected: 06/24/25 1720    Specimen: Blood Updated: 06/24/25 1730     Extra Tube Hold for add-ons.     Comment: Auto resulted       POC Lactate [[id number]]  (Abnormal) Collected: 06/24/25 1724    Specimen: Blood Updated: 06/24/25 1726     Lactate 3.3 mmol/L      Comment: Serial Number: [id number]:  [id number]       Blood Culture - Blood, Arm, Left [[id number]] Collected: 06/24/25 1720    Specimen: Blood from Arm, Left Updated: 06/24/25 1725    Blood Culture - Blood, Hand, Left [[id number]] Collected: 06/24/25 1720    Specimen: Blood from Hand, Left Updated: 06/24/25 1725             I reviewed the patient's new clinical results.    Assessment & Plan       SUZETTE (acute kidney injury)    Hypertension    Hyperlipidemia    Coronary artery disease    Hypokalemia    Leukocytosis    Elevated lactic acid level    Anemia    Sepsis, unspecified organism    Weakness    Constipation    Falls frequently    Dizziness    Hard of hearing    Body mass index (BMI) of 19.0 to 19.9 in adult     Cigarette smoker    CKD (chronic kidney disease) stage 3, GFR 30-59 ml/min    Atrial fibrillation    Left hip pain    Leukocytosis with anemia   -concern for new onset leukemia- consider oncology/hematology consult   -Ct chest, abd/pelvis   -check iron panel, ferritin, b12, folate, hemocult stool     Sepsis of unknown origin ? Pneumonia developing with lactic acidosis and SUZETTE,    -NS @ 100.   -Lactic trending up.  Addition 1L given. Recheck.  Procal 0.28   -zosyn.     -legionella, strep pneumo pending.  MRSA swab negative.   -UA pending    Tachycardia, dizziness   -EKG: notified that it showed showed Afib with QTC of 593   -Metoprolol 12.5mg q12 ordered   -consult cardiology   -echo   -CT Head   -patient told nursing that the only medication he routinely takes is the home Metoprolol 100mg- dose reduced to hypotension and dizziness.    Left hip pain with lower extremity edema   -Ct ABD/pelvis. Concern for possible fracture due to leg shortening. Left leg rotates with mild external rotation, but was able to correct with assistance.    -duplex BLE    SUZETTE on CKD3-   -baseline Creatinine in January was about 1.6   -noted hypotension upon arrival of 82/47 that responded well to fluids.   -check uric acid   -IV hydration   -consider nephrology consult   -check PVR   -renal ultrasound   -K 3.3: additional 40 meq po given.  Mag 2.3 on admit     Likely underlying COPD- no PFT   -prn duonebs   -nicotine patch, smoking cessation.   -currently on room air    Weakness, fall   -consult PT/OT   -may need additional help post discharge    Constipation   -reported he had to manually disimpact himself prior to arrival   -bowel regimen ordered.    VTE: Lovenox  GI: protonix    CODE STATUS:  Code status (Patient has no pulse and is not breathing):  CPR (Attempt to Resuscitate)  Medical Interventions (Patient has pulse or is breathing):  Full Support  Level of Support Discussed with:  Patient    Admission Status:  I believe this patient  meets inpatient status    Expected length of stay:  2 midnights or greater    I discussed the patient's findings and my recommendations with patient.     RAAD Garcia  06/25/25  02:38 EDT

## 2025-06-25 NOTE — CASE MANAGEMENT/SOCIAL WORK
Discharge Planning Assessment   Sam     Patient Name: Jeremias Negro  MRN: [id number]  Today's Date: 6/25/2025    Admit Date: 6/24/2025    Plan: Anticipate routine home alone pending PT/OT recommendations.   Discharge Needs Assessment       Row Name 06/25/25 1328       Living Environment    People in Home alone    Current Living Arrangements home  Patient reported that he has a four bedroom house    Potentially Unsafe Housing Conditions none    In the past 12 months has the electric, gas, oil, or water company threatened to shut off services in your home? No    Primary Care Provided by self    Provides Primary Care For no one    Family Caregiver if Needed child(fidel), adult    Quality of Family Relationships helpful;involved;supportive    Able to Return to Prior Arrangements yes       Resource/Environmental Concerns    Resource/Environmental Concerns none    Transportation Concerns none       Transportation Needs    In the past 12 months, has lack of transportation kept you from medical appointments or from getting medications? no    In the past 12 months, has lack of transportation kept you from meetings, work, or from getting things needed for daily living? No       Food Insecurity    Within the past 12 months, you worried that your food would run out before you got the money to buy more. Never true    Within the past 12 months, the food you bought just didn't last and you didn't have money to get more. Never true       Transition Planning    Patient/Family Anticipates Transition to home    Patient/Family Anticipated Services at Transition none    Transportation Anticipated car, drives self;family or friend will provide       Discharge Needs Assessment    Readmission Within the Last 30 Days no previous admission in last 30 days    Equipment Currently Used at Home cane, straight;walker, rolling    Concerns to be Addressed denies needs/concerns at this time    Anticipated Changes Related to Illness none     Equipment Needed After Discharge none    Provided Post Acute Provider List? N/A    Provided Post Acute Provider Quality & Resource List? N/A    Offered/Gave Vendor List no                   Discharge Plan       Row Name 06/25/25 1326       Plan    Plan Anticipate routine home alone pending PT/OT recommendations.    Patient/Family in Agreement with Plan yes    Plan Comments CM met with patient at bedside to discuss dc planning and IMM letter. PCP and pharmacy confirmed, currently enrolled in [facility] meds to bed, reported no trouble affording food/medications, and declined needs at this time for any DME/HH/PT services. Reported that his daughter-in-law comes over and helps him when he needs it. Reported that he still drives even though they do not want him to. Reported that he has a red 2017 Jeep Compass.                   Demographic Summary       Row Name 06/25/25 1324       General Information    Admission Type inpatient    Arrived From emergency department    Required Notices Provided Important Message from Medicare    Referral Source admission list    Reason for Consult discharge planning    Preferred Language English       Contact Information    Permission Granted to Share Info With               Functional Status       Row Name 06/25/25 1324       Functional Status    Usual Activity Tolerance moderate    Current Activity Tolerance moderate       Functional Status, IADL    Medications independent    Meal Preparation independent    Housekeeping independent    Laundry independent    Shopping independent           Lilibeth Chavez RN     Office Phone: [phone]  Office Cell: [phone]

## 2025-06-25 NOTE — PLAN OF CARE
Goal Outcome Evaluation:              Outcome Evaluation: Pt is an 84 y.o. male with pmh of CAD s/p CABG, HTN, HLD, current smoker who presented to the ER per instructions of his PCP after having abnormal blood work.  Outpatient labs noted a WBC of 42,000 and he was instructed to come to the hospital for a new onset leukemia workup.  Pt reports intermittent nausea, decreased urination and constipation along with dizziness resulting in multiple falls. Pt also admits to non-compliance with his medicine regimen at home. At baseline, pt lives alone in [facility] with 3 PITER. He states he primarily sponge bathes, walks holding onto furniture vs using his walker, cooks using microwave only & drives occasionally even though he states his family does not want him driving. His dtr picks up groceries for him & cooks meals for him sometimes. Pt also states he is frequently incontinent at home. Upon eval, pt is A&O X4. He was unable to name POTUS & the Short Blessed Test was performed with pt scoring 17/28 indicating impairment consistent with dementia. Pt also unable to recall #911 when asked what number to call for emergencies. Pt requiring min A for LB ADLs & ambulation using a RW. He requires v.c. for safety awareness & frequently laughs when attempting to educate or redirect pt for safety concerns. Pt has generalized weakness, dec activity tolerance & standing balance, dec left ankle AROM/strength & bilateral shoulder strength, impaired cognition & is requiring inc assist & v.c. for safe mobility & ADL performance. Pt seems unsafe to return home alone & is at a high risk for falls. OT will continue to follow for tx & recommends SNF upon discharge.    Anticipated Discharge Disposition (OT): skilled nursing facility

## 2025-06-25 NOTE — PLAN OF CARE
Goal Outcome Evaluation:  Plan of Care Reviewed With: patient        Progress: no change  Outcome Evaluation: Lactic continues to be elevated. Renal ultrasound, echo and duplex done today. Will continue to monitor.

## 2025-06-25 NOTE — PLAN OF CARE
Goal Outcome Evaluation:  Plan of Care Reviewed With: patient           Outcome Evaluation: 83 yo male adm 6/24/25 for weakness, SUZETTE, sepsis. Currently being worked up for possible leukemia 2* OP labs showing WBC of 42,000. Pt has had multiple falls recently, primarily due to loss of balance per pt. Upon further questioning, has also been falling due to tripping on LLE, as well as LE weakness. PMH: cad, [place], smoker, ckd3, a fib. At baseline, pt lives alone in single level home w/ 3 stairs to enter. Pt reports furniture walking around his apartment. Pt reports that he drives but that his family does not want him to. Has rw, but reports he does not use it. Today, pt shows confusion upon exam. He is extremely impulsive and demonstrates risky behavior w/ mobility, including having difficulty following directions for safe gait using rw. He was able to amb 60 ft w/ min assist x 2, but had to be monitored very closely. He has chronic foot drop for the L foot. Pt is very unsafe and very high risk for injurious falls. Not safe for home at d/c. Will need SNF at d/c. Will follow.

## 2025-06-25 NOTE — CONSULTS
CARDIOLOGY CONSULT:    Jeremias Negro  1940  male  [id number]      Referring Provider: Dr. Collazo  Reason for Consultation: Atrial fibrillation, prolonged QTc    Patient Care Team:  Tomer Azevedo MD as PCP - General (Family Medicine)    Chief complaint abnormal lab work    Subjective .     History of present illness:  Jeremias Negro is a 84 y.o. male with a history of CAD, hypertension, dyslipidemia, tobacco abuse who presented to [facility] on 6/24/2025 per his PCP due to abnormal lab work.  Outpatient labs noted WBC of 42,000.  Patient reports symptoms of dyspnea, fatigue, weakness, abdominal pain and constipation over the last few weeks.  He denies chest pain, palpitations, syncope.  Cardiology consulted for new onset atrial fibrillation and prolonged QTc.  EKG reveals atrial fibrillation with RVR, QTc 595.  No previous EKG for comparison available.  Please note patient has not been evaluated by cardiology since 2019    Labs: Creatinine 2.03--1.86, potassium 3.3, lactate 4.0, WBC 48.57, H&H 10.5/34    Review of Systems   Constitutional: Positive for malaise/fatigue. Negative for chills and diaphoresis.   Cardiovascular:  Positive for dyspnea on exertion. Negative for chest pain, leg swelling, palpitations and syncope.   Respiratory:  Positive for cough and shortness of breath.    Gastrointestinal:  Positive for abdominal pain and constipation. Negative for vomiting.   Neurological:  Positive for weakness. Negative for dizziness and light-headedness.       History  Past Medical History:   Diagnosis Date    Coronary artery disease     Hyperlipidemia     Hypertension        History reviewed. No pertinent surgical history.    History reviewed. No pertinent family history.    Social History     Tobacco Use    Smoking status: Every Day     Current packs/day: 2.00     Average packs/day: 2.0 packs/day for 16.5 years (33.0 ttl pk-yrs)     Types: Cigarettes     Start date: 2009   Vaping Use    Vaping status: Never Used    Substance Use Topics    Alcohol use: No    Drug use: Never        Medications Prior to Admission   Medication Sig Dispense Refill Last Dose/Taking    metoprolol tartrate (LOPRESSOR) 100 MG tablet METOPROLOL TARTRATE 100 MG TABS   6/23/2025 Evening    amLODIPine (NORVASC) 5 MG tablet Daily.   More than a month    citalopram (CeleXA) 40 MG tablet Take 1 tablet by mouth Daily.  12 More than a month    CloNIDine (CATAPRES) 0.2 MG tablet Daily.   More than a month    HYDROcodone-acetaminophen (NORCO)  MG per tablet Take 1 tablet by mouth 4 (Four) Times a Day.  0 More than a month    omeprazole (priLOSEC) 20 MG capsule OMEPRAZOLE 20 MG CPDR   More than a month    terazosin (HYTRIN) 2 MG capsule TERAZOSIN HCL 2 MG CAPS   More than a month       Allergies: Patient has no known allergies.    Scheduled Meds:[Held by provider] amLODIPine, 5 mg, Oral, Q24H  budesonide, 0.5 mg, Nebulization, BID - RT  [Held by provider] citalopram, 40 mg, Oral, Daily  [Held by provider] cloNIDine, 0.2 mg, Oral, Q24H  enoxaparin sodium, 1 mg/kg, Subcutaneous, Q24H  ipratropium-albuterol, 3 mL, Nebulization, TID  methylPREDNISolone sodium succinate, 60 mg, Intravenous, Q12H  metoprolol tartrate, 12.5 mg, Oral, Q12H  nicotine, 1 patch, Transdermal, Daily  pantoprazole, 40 mg, Oral, Q AM  piperacillin-tazobactam, 3.375 g, Intravenous, Q8H  polyethylene glycol, 17 g, Oral, Daily  senna-docusate sodium, 2 tablet, Oral, BID  sodium chloride, 10 mL, Intravenous, Q12H      Continuous Infusions:Pharmacy to Dose enoxaparin (LOVENOX),   Pharmacy To Dose:,   sodium chloride, 100 mL/hr, Last Rate: 100 mL/hr (06/25/25 0158)      PRN Meds:.  acetaminophen    senna-docusate sodium **AND** polyethylene glycol **AND** bisacodyl **AND** bisacodyl    Calcium Replacement - Follow Nurse / BPA Driven Protocol    HYDROcodone-acetaminophen    ipratropium-albuterol    Magnesium Standard Dose Replacement - Follow Nurse / BPA Driven Protocol    nitroglycerin     "ondansetron ODT **OR** ondansetron    Pharmacy to Dose enoxaparin (LOVENOX)    Pharmacy To Dose:    Phosphorus Replacement - Follow Nurse / BPA Driven Protocol    Potassium Replacement - Follow Nurse / BPA Driven Protocol    [COMPLETED] Insert Peripheral IV **AND** sodium chloride    sodium chloride    sodium chloride    Objective     VITAL SIGNS  Vitals:    06/25/25 1100 06/25/25 1118 06/25/25 1300 06/25/25 1307   BP:  130/68     BP Location:  Left arm     Patient Position:  Lying     Pulse: 100 95 103 102   Resp:  23     Temp:  97.8 °F (36.6 °C)     TempSrc:  Oral     SpO2:  92%     Weight:       Height:           Flowsheet Rows      Flowsheet Row First Filed Value   Admission Height 177.8 cm (70\") Documented at 06/24/2025 1604   Admission Weight 59 kg (130 lb) Documented at 06/24/2025 1604             TELEMETRY: Atrial fibrillation with controlled ventricular response    Physical Exam:  Vitals reviewed.   Constitutional:       Appearance: Acutely ill-appearing.   Eyes:      Pupils: Pupils are equal, round, and reactive to light.   HENT:      Nose: Nose normal.   Pulmonary:      Effort: Pulmonary effort is normal.      Breath sounds: Rhonchi present.   Cardiovascular:      Normal rate. Irregularly irregular rhythm.   Pulses:     Intact distal pulses.   Abdominal:      Palpations: Abdomen is soft.   Musculoskeletal: Normal range of motion.      Cervical back: Normal range of motion and neck supple. Skin:     General: Skin is warm and dry.   Neurological:      General: No focal deficit present.      Mental Status: Alert.          Results Review:   I reviewed the patient's new clinical results.  Lab Results (last 24 hours)       Procedure Component Value Units Date/Time    Folate [[id number]]  (Abnormal) Collected: 06/24/25 1720    Specimen: Blood Updated: 06/25/25 1100     Folate 3.28 ng/mL     Narrative:      Results may be falsely increased if patient taking Biotin.      Vitamin B12 [[id number]]  (Normal) Collected: " "06/24/25 1720    Specimen: Blood Updated: 06/25/25 1100     Vitamin B-12 618 pg/mL     Narrative:      Results may be falsely increased if patient taking Biotin.      Procalcitonin [[id number]]  (Abnormal) Collected: 06/25/25 0000    Specimen: Blood from Arm, Right Updated: 06/25/25 0849     Procalcitonin 0.30 ng/mL     Narrative:      As a Marker for Sepsis (Non-Neonates):    1. <0.5 ng/mL represents a low risk of severe sepsis and/or septic shock.  2. >2 ng/mL represents a high risk of severe sepsis and/or septic shock.    As a Marker for Lower Respiratory Tract Infections that require antibiotic therapy:    PCT on Admission    Antibiotic Therapy       6-12 Hrs later    >0.5                Strongly Recommended  >0.25 - <0.5        Recommended   0.1 - 0.25          Discouraged              Remeasure/reassess PCT  <0.1                Strongly Discouraged     Remeasure/reassess PCT    As 28 day mortality risk marker: \"Change in Procalcitonin Result\" (>80% or <=80%) if Day 0 (or Day 1) and Day 4 values are available. Refer to http://www.[employer]-pct-calculator.com    Change in PCT <=80%  A decrease of PCT levels below or equal to 80% defines a positive change in PCT test result representing a higher risk for 28-day all-cause mortality of patients diagnosed with severe sepsis for septic shock.    Change in PCT >80%  A decrease of PCT levels of more than 80% defines a negative change in PCT result representing a lower risk for 28-day all-cause mortality of patients diagnosed with severe sepsis or septic shock.       STAT Lactic Acid, Reflex [[id number]]  (Abnormal) Collected: 06/25/25 0342    Specimen: Blood from Arm, Left Updated: 06/25/25 0413     Lactate 4.0 mmol/L     C-reactive Protein [[id number]]  (Abnormal) Collected: 06/25/25 0000    Specimen: Blood from Arm, Right Updated: 06/25/25 0400     C-Reactive Protein 9.65 mg/dL     Legionella Antigen, Urine - Urine, Urine, Clean Catch [[id number]]  (Normal) Collected: " 06/25/25 0336    Specimen: Urine, Clean Catch Updated: 06/25/25 0357     LEGIONELLA ANTIGEN, URINE Negative    S. Pneumo Ag Urine or CSF - Urine, Urine, Clean Catch [[id number]]  (Normal) Collected: 06/25/25 0336    Specimen: Urine, Clean Catch Updated: 06/25/25 0357     Strep Pneumo Ag Negative    Urinalysis, Microscopic Only - Urine, Clean Catch [[id number]]  (Abnormal) Collected: 06/25/25 0336    Specimen: Urine, Clean Catch Updated: 06/25/25 0349     RBC, UA 3-5 /HPF      WBC, UA 0-2 /HPF      Comment: Urine culture not indicated.        Bacteria, UA None Seen /HPF      Squamous Epithelial Cells, UA 0-2 /HPF      Hyaline Casts, UA 0-2 /LPF      Methodology Automated Microscopy    Urinalysis With Culture If Indicated - Urine, Clean Catch [[id number]]  (Abnormal) Collected: 06/25/25 0336    Specimen: Urine, Clean Catch Updated: 06/25/25 0345     Color, UA Yellow     Appearance, UA Clear     pH, UA 5.5     Specific Gravity, UA 1.023     Glucose, UA Negative     Ketones, UA Trace     Bilirubin, UA Negative     Blood, UA Negative     Protein,  mg/dL (2+)     Leuk Esterase, UA Negative     Nitrite, UA Negative     Urobilinogen, UA 2.0 E.U./dL    Narrative:      In absence of clinical symptoms, the presence of pyuria, bacteria, and/or nitrites on the urinalysis result does not correlate with infection.    MRSA Screen, PCR (Inpatient) - Swab, Nares [[id number]]  (Normal) Collected: 06/25/25 0146    Specimen: Swab from Nares Updated: 06/25/25 0310     MRSA PCR No MRSA Detected    Narrative:      The negative predictive value of this diagnostic test is high and should only be used to consider de-escalating anti-MRSA therapy. A positive result may indicate colonization with MRSA and must be correlated clinically.    Sedimentation Rate [[id number]]  (Abnormal) Collected: 06/25/25 0000    Specimen: Blood from Arm, Right Updated: 06/25/25 0251     Sed Rate 31 mm/hr     STAT Lactic Acid, Reflex [[id number]]  (Abnormal)  Collected: 06/25/25 0000    Specimen: Blood from Arm, Right Updated: 06/25/25 0044     Lactate 4.9 mmol/L     Basic Metabolic Panel [[id number]]  (Abnormal) Collected: 06/25/25 0000    Specimen: Blood from Arm, Right Updated: 06/25/25 0043     Glucose 104 mg/dL      BUN 26.7 mg/dL      Creatinine 1.86 mg/dL      Sodium 137 mmol/L      Potassium 3.3 mmol/L      Comment: Specimen hemolyzed.  Result may be falsely elevated.        Chloride 104 mmol/L      CO2 18.9 mmol/L      Calcium 8.7 mg/dL      BUN/Creatinine Ratio 14.4     Anion Gap 14.1 mmol/L      eGFR 35.5 mL/min/1.73     Narrative:      GFR Categories in Chronic Kidney Disease (CKD)              GFR Category          GFR (mL/min/1.73)    Interpretation  G1                    90 or greater        Normal or high (1)  G2                    60-89                Mild decrease (1)  G3a                   45-59                Mild to moderate decrease  G3b                   30-44                Moderate to severe decrease  G4                    15-29                Severe decrease  G5                    14 or less           Kidney failure    (1)In the absence of evidence of kidney disease, neither GFR category G1 or G2 fulfill the criteria for CKD.    eGFR calculation 2021 CKD-EPI creatinine equation, which does not include race as a factor    Uric Acid [[id number]]  (Abnormal) Collected: 06/25/25 0000    Specimen: Blood from Arm, Right Updated: 06/25/25 0034     Uric Acid 8.4 mg/dL     CBC & Differential [[id number]]  (Abnormal) Collected: 06/25/25 0000    Specimen: Blood from Arm, Right Updated: 06/25/25 0013    Narrative:      The following orders were created for panel order CBC & Differential.  Procedure                               Abnormality         Status                     ---------                               -----------         ------                     CBC Auto Differential[[id number]]        Abnormal            Final result               Scan  "Slide[[id number]]                                                                    Please view results for these tests on the individual orders.    CBC Auto Differential [[id number]]  (Abnormal) Collected: 06/25/25 0000    Specimen: Blood from Arm, Right Updated: 06/25/25 0013     WBC 48.57 10*3/mm3      RBC 3.64 10*6/mm3      Hemoglobin 10.5 g/dL      Hematocrit 34.0 %      MCV 93.4 fL      MCH 28.8 pg      MCHC 30.9 g/dL      RDW 17.2 %      RDW-SD 57.7 fl      MPV 9.5 fL      Platelets 312 10*3/mm3      Neutrophil % 83.7 %      Lymphocyte % 6.5 %      Monocyte % 4.2 %      Eosinophil % 2.9 %      Basophil % 0.4 %      Immature Grans % 2.3 %      Neutrophils, Absolute 40.62 10*3/mm3      Lymphocytes, Absolute 3.17 10*3/mm3      Monocytes, Absolute 2.06 10*3/mm3      Eosinophils, Absolute 1.42 10*3/mm3      Basophils, Absolute 0.17 10*3/mm3      Immature Grans, Absolute 1.13 10*3/mm3      nRBC 0.0 /100 WBC     Procalcitonin [[id number]]  (Abnormal) Collected: 06/24/25 2213    Specimen: Blood Updated: 06/24/25 2248     Procalcitonin 0.28 ng/mL     Narrative:      As a Marker for Sepsis (Non-Neonates):    1. <0.5 ng/mL represents a low risk of severe sepsis and/or septic shock.  2. >2 ng/mL represents a high risk of severe sepsis and/or septic shock.    As a Marker for Lower Respiratory Tract Infections that require antibiotic therapy:    PCT on Admission    Antibiotic Therapy       6-12 Hrs later    >0.5                Strongly Recommended  >0.25 - <0.5        Recommended   0.1 - 0.25          Discouraged              Remeasure/reassess PCT  <0.1                Strongly Discouraged     Remeasure/reassess PCT    As 28 day mortality risk marker: \"Change in Procalcitonin Result\" (>80% or <=80%) if Day 0 (or Day 1) and Day 4 values are available. Refer to http://www.[facility]-pct-calculator.com    Change in PCT <=80%  A decrease of PCT levels below or equal to 80% defines a positive change in PCT test result representing a " higher risk for 28-day all-cause mortality of patients diagnosed with severe sepsis for septic shock.    Change in PCT >80%  A decrease of PCT levels of more than 80% defines a negative change in PCT result representing a lower risk for 28-day all-cause mortality of patients diagnosed with severe sepsis or septic shock.       Iron Profile w/o Ferritin [[id number]]  (Abnormal) Collected: 06/24/25 1720    Specimen: Blood Updated: 06/24/25 2158     Iron 38 mcg/dL      Iron Saturation (TSAT) 19 %      Transferrin 137 mg/dL      TIBC 204 mcg/dL     Ferritin [[id number]]  (Abnormal) Collected: 06/24/25 1720    Specimen: Blood Updated: 06/24/25 2158     Ferritin 667.00 ng/mL     Narrative:      Results may be falsely decreased if patient taking Biotin.      STAT Lactic Acid, Reflex [[id number]]  (Abnormal) Collected: 06/24/25 1954    Specimen: Blood Updated: 06/24/25 2017     Lactate 3.4 mmol/L     CBC & Differential [[id number]]  (Abnormal) Collected: 06/24/25 1720    Specimen: Blood Updated: 06/24/25 1801    Narrative:      The following orders were created for panel order CBC & Differential.  Procedure                               Abnormality         Status                     ---------                               -----------         ------                     CBC Auto Differential[[id number]]        Abnormal            Final result               Scan Slide[[id number]]                                       Final result                 Please view results for these tests on the individual orders.    CBC Auto Differential [[id number]]  (Abnormal) Collected: 06/24/25 1720    Specimen: Blood Updated: 06/24/25 1801     WBC 48.42 10*3/mm3      RBC 3.44 10*6/mm3      Hemoglobin 9.9 g/dL      Hematocrit 32.1 %      MCV 93.3 fL      MCH 28.8 pg      MCHC 30.8 g/dL      RDW 17.1 %      RDW-SD 57.1 fl      MPV 9.5 fL      Platelets 315 10*3/mm3     Narrative:      The previously reported component NRBC is no longer being reported.  Previous result was 0.0 /100 WBC (Reference Range: 0.0-0.2 /100 WBC) on 6/24/2025 at 1737 EDT.    Scan Slide [[id number]] Collected: 06/24/25 1720    Specimen: Blood Updated: 06/24/25 1801     Scan Slide --     Comment: See Manual Differential Results       Manual Differential [[id number]]  (Abnormal) Collected: 06/24/25 1720    Specimen: Blood Updated: 06/24/25 1801     Neutrophil % 92.0 %      Lymphocyte % 4.0 %      Monocyte % 2.0 %      Eosinophil % 2.0 %      Neutrophils Absolute 44.55 10*3/mm3      Lymphocytes Absolute 1.94 10*3/mm3      Monocytes Absolute 0.97 10*3/mm3      Eosinophils Absolute 0.97 10*3/mm3      RBC Morphology Normal     WBC Morphology Normal     Platelet Estimate Adequate    Comprehensive Metabolic Panel [[id number]]  (Abnormal) Collected: 06/24/25 1720    Specimen: Blood Updated: 06/24/25 1757     Glucose 108 mg/dL      BUN 28.8 mg/dL      Creatinine 2.03 mg/dL      Sodium 140 mmol/L      Potassium 3.3 mmol/L      Chloride 105 mmol/L      CO2 20.0 mmol/L      Calcium 9.0 mg/dL      Total Protein 6.3 g/dL      Albumin 2.9 g/dL      ALT (SGPT) 6 U/L      AST (SGOT) 23 U/L      Alkaline Phosphatase 124 U/L      Total Bilirubin 0.7 mg/dL      Globulin 3.4 gm/dL      A/G Ratio 0.9 g/dL      BUN/Creatinine Ratio 14.2     Anion Gap 15.0 mmol/L      eGFR 31.9 mL/min/1.73     Narrative:      GFR Categories in Chronic Kidney Disease (CKD)              GFR Category          GFR (mL/min/1.73)    Interpretation  G1                    90 or greater        Normal or high (1)  G2                    60-89                Mild decrease (1)  G3a                   45-59                Mild to moderate decrease  G3b                   30-44                Moderate to severe decrease  G4                    15-29                Severe decrease  G5                    14 or less           Kidney failure    (1)In the absence of evidence of kidney disease, neither GFR category G1 or G2 fulfill the criteria for  CKD.    eGFR calculation 2021 CKD-EPI creatinine equation, which does not include race as a factor    Magnesium [[id number]]  (Normal) Collected: 06/24/25 1720    Specimen: Blood Updated: 06/24/25 1757     Magnesium 2.3 mg/dL     CK [[id number]]  (Normal) Collected: 06/24/25 1720    Specimen: Blood Updated: 06/24/25 1757     Creatine Kinase 46 U/L     TSH Rfx On Abnormal To Free T4 [[id number]]  (Normal) Collected: 06/24/25 1720    Specimen: Blood Updated: 06/24/25 1757     TSH 1.490 uIU/mL     Extra Tubes [[id number]] Collected: 06/24/25 1720    Specimen: Blood Updated: 06/24/25 1730    Narrative:      The following orders were created for panel order Extra Tubes.  Procedure                               Abnormality         Status                     ---------                               -----------         ------                     Gold Top - SST[[id number]]                                   Final result               Light Blue Top[[id number]]                                   Final result                 Please view results for these tests on the individual orders.    Gold Top - SST [[id number]] Collected: 06/24/25 1720    Specimen: Blood Updated: 06/24/25 1730     Extra Tube Hold for add-ons.     Comment: Auto resulted.       Light Blue Top [[id number]] Collected: 06/24/25 1720    Specimen: Blood Updated: 06/24/25 1730     Extra Tube Hold for add-ons.     Comment: Auto resulted       POC Lactate [[id number]]  (Abnormal) Collected: 06/24/25 1724    Specimen: Blood Updated: 06/24/25 1726     Lactate 3.3 mmol/L      Comment: Serial Number: [id number]:  [id number]       Blood Culture - Blood, Arm, Left [[id number]] Collected: 06/24/25 1720    Specimen: Blood from Arm, Left Updated: 06/24/25 1725    Blood Culture - Blood, Hand, Left [[id number]] Collected: 06/24/25 1720    Specimen: Blood from Hand, Left Updated: 06/24/25 1725            Imaging Results (Last 24 Hours)       Procedure Component Value Units Date/Time     CT Chest Without Contrast Diagnostic [[id number]] Collected: 06/25/25 0739     Updated: 06/25/25 0831    Narrative:      CT ABDOMEN PELVIS WO CONTRAST, CT CHEST WO CONTRAST DIAGNOSTIC    Date of Exam: 6/25/2025 12:30 AM EDT    Indication: unexplained weight loss, left hip pain and leg shortening.    Comparison: Renal ultrasound same date.    Technique: Axial CT images were obtained of the chest, abdomen, and pelvis without the administration of contrast. Sagittal and coronal reconstructions were performed.  Automated exposure control and iterative reconstruction methods were used.    Findings:  Motion degraded exam.    CHEST  There is bulky lymphadenopathy throughout the mediastinum; for reference, a right precarinal lymph node conglomerate measures 5.6 x 5.3 cm (series 2 image 41). There may be hilar lymphadenopathy, though is difficult to measure by noncontrast technique.   There is a soft tissue implant at the right shoulder measuring 7.4 cm in greatest dimension, partially extending outside the field-of-view. Soft tissue nodule in the epicardial fat near the left ventricle measuring 2.4 x 1.7 cm (series 2 image 67).    Heart is enlarged. Normal caliber atherosclerotic thoracic aorta. CABG with calcifications of the native coronary arteries. Aortic valve and mitral annulus calcification. No pericardial effusion.    Mild diffuse chest wall edema. Sternotomy. Advanced multilevel spondylosis. Bones appear demineralized. No distinct marrow replacing lesion.    Small volume mucous/debris in the right mainstem bronchus. Bronchial wall thickening and mucoid impaction in the right greater than left lower lobes. Biapical paraseptal emphysematous change. Mild biapical and bibasilar interlobular septal thickening.   Nodule in the peripheral right upper lobe measuring 12 x 10 mm (series 2 image 45). Benign calcified granuloma in the left lobe. Right greater than left lower lobe cystic change, favor chronic. No lobar  consolidation. Minimal pleural fluid bilaterally.   No pneumothorax.    ABDOMEN/PELVIS  No distinct evidence of focal liver lesion on this noncontrast exam. Cholelithiasis without evidence of acute cholecystitis. No distinct biliary ductal dilatation. Generalized pancreatic parenchymal atrophy, likely senescent change. No findings of acute   pancreatitis. Spleen is normal in size. Thickening of the bilateral adrenal glands.    Large mass involving the left kidney appears to displace the majority of the normal parenchyma and also obstruct the upper pole, difficult to measure by noncontrast exam but spans at least 8.6 x 5.9 cm in axial dimension (series 2 image 57). Streak   artifact from left hip hardware obscures the pelvis, though the urinary bladder appears grossly unremarkable and there is likely prostatomegaly.    Colonic diverticulosis without evidence of acute diverticulitis. No evidence of bowel obstruction. Left inguinal hernia containing fat and fluid. Small volume pelvic free fluid.    Multiple large intramuscular and soft tissue implants, for example involving the left perineal soft tissues, left gluteal muscles, left abdominal wall muscles, left paraspinal muscles, and right iliacus muscle, all of which measure at least 5 cm. For   reference, the soft tissue implant in the left perineum measures 10.0 x 7.5 cm (series 2 image 150).    Innumerable peritoneal, bilateral retroperitoneal, and mesenteric metastatic implants, many which are large. Peritoneal implant spans the abdomen in the right upper quadrant to the pelvis in the left lower quadrant as well as the space of Retzius. For   reference, a right lower quadrant implant measures 8.2 x 7.5 cm (series 2 image 75) and a mesenteric implant measures 6.2 x 5.1 cm (series 2 image 65). There is an implant involving a loop of small bowel in the left lower quadrant (series 2 image 91),   without bowel obstruction.    Atherosclerosis. Ectasia of the infrarenal  abdominal aorta without aneurysmal dilatation. Mild diffuse body wall edema. Total left hip arthroplasty with surrounding streak artifact. Advanced multilevel spondylosis. Bones appear demineralized. No distinct   marrow replacing lesion.      Impression:      Impression:  Please note this is a motion-degraded, noncontrast exam which makes assessment somewhat suboptimal. Within these confines, there is widely metastatic malignancy throughout the chest, abdomen, and pelvis as summarized below. Recommend tissue sampling.    Chest:  - Bulky metastatic mediastinal lymphadenopathy.  - Large metastatic soft tissue implant in the right shoulder.  - Small metastatic implant in the epicardial fat.  - Indeterminate right upper lobe pulmonary nodule.  - Bibasilar bronchial wall thickening and mucoid impaction, suggestive of an infectious/inflammatory process. Areas of interlobular septal thickening may reflect mild pulmonary edema. Background of emphysematous and chronic changes of the lungs.    Abdomen/pelvis:  - Large mass replacing the left kidney.  - Innumerable metastatic implants in the peritoneum, bilateral retroperitoneum, and mesentery.  - Numerous metastatic intramuscular and soft tissue implants.  - Small volume pelvic ascites.      Electronically Signed: Rodger Mckeon MD    6/25/2025 8:29 AM EDT    Workstation ID: [id number]    CT Abdomen Pelvis Without Contrast [[id number]] Collected: 06/25/25 0739     Updated: 06/25/25 0831    Narrative:      CT ABDOMEN PELVIS WO CONTRAST, CT CHEST WO CONTRAST DIAGNOSTIC    Date of Exam: 6/25/2025 12:30 AM EDT    Indication: unexplained weight loss, left hip pain and leg shortening.    Comparison: Renal ultrasound same date.    Technique: Axial CT images were obtained of the chest, abdomen, and pelvis without the administration of contrast. Sagittal and coronal reconstructions were performed.  Automated exposure control and iterative reconstruction methods were  used.    Findings:  Motion degraded exam.    CHEST  There is bulky lymphadenopathy throughout the mediastinum; for reference, a right precarinal lymph node conglomerate measures 5.6 x 5.3 cm (series 2 image 41). There may be hilar lymphadenopathy, though is difficult to measure by noncontrast technique.   There is a soft tissue implant at the right shoulder measuring 7.4 cm in greatest dimension, partially extending outside the field-of-view. Soft tissue nodule in the epicardial fat near the left ventricle measuring 2.4 x 1.7 cm (series 2 image 67).    Heart is enlarged. Normal caliber atherosclerotic thoracic aorta. CABG with calcifications of the native coronary arteries. Aortic valve and mitral annulus calcification. No pericardial effusion.    Mild diffuse chest wall edema. Sternotomy. Advanced multilevel spondylosis. Bones appear demineralized. No distinct marrow replacing lesion.    Small volume mucous/debris in the right mainstem bronchus. Bronchial wall thickening and mucoid impaction in the right greater than left lower lobes. Biapical paraseptal emphysematous change. Mild biapical and bibasilar interlobular septal thickening.   Nodule in the peripheral right upper lobe measuring 12 x 10 mm (series 2 image 45). Benign calcified granuloma in the left lobe. Right greater than left lower lobe cystic change, favor chronic. No lobar consolidation. Minimal pleural fluid bilaterally.   No pneumothorax.    ABDOMEN/PELVIS  No distinct evidence of focal liver lesion on this noncontrast exam. Cholelithiasis without evidence of acute cholecystitis. No distinct biliary ductal dilatation. Generalized pancreatic parenchymal atrophy, likely senescent change. No findings of acute   pancreatitis. Spleen is normal in size. Thickening of the bilateral adrenal glands.    Large mass involving the left kidney appears to displace the majority of the normal parenchyma and also obstruct the upper pole, difficult to measure by  noncontrast exam but spans at least 8.6 x 5.9 cm in axial dimension (series 2 image 57). Streak   artifact from left hip hardware obscures the pelvis, though the urinary bladder appears grossly unremarkable and there is likely prostatomegaly.    Colonic diverticulosis without evidence of acute diverticulitis. No evidence of bowel obstruction. Left inguinal hernia containing fat and fluid. Small volume pelvic free fluid.    Multiple large intramuscular and soft tissue implants, for example involving the left perineal soft tissues, left gluteal muscles, left abdominal wall muscles, left paraspinal muscles, and right iliacus muscle, all of which measure at least 5 cm. For   reference, the soft tissue implant in the left perineum measures 10.0 x 7.5 cm (series 2 image 150).    Innumerable peritoneal, bilateral retroperitoneal, and mesenteric metastatic implants, many which are large. Peritoneal implant spans the abdomen in the right upper quadrant to the pelvis in the left lower quadrant as well as the space of Retzius. For   reference, a right lower quadrant implant measures 8.2 x 7.5 cm (series 2 image 75) and a mesenteric implant measures 6.2 x 5.1 cm (series 2 image 65). There is an implant involving a loop of small bowel in the left lower quadrant (series 2 image 91),   without bowel obstruction.    Atherosclerosis. Ectasia of the infrarenal abdominal aorta without aneurysmal dilatation. Mild diffuse body wall edema. Total left hip arthroplasty with surrounding streak artifact. Advanced multilevel spondylosis. Bones appear demineralized. No distinct   marrow replacing lesion.      Impression:      Impression:  Please note this is a motion-degraded, noncontrast exam which makes assessment somewhat suboptimal. Within these confines, there is widely metastatic malignancy throughout the chest, abdomen, and pelvis as summarized below. Recommend tissue sampling.    Chest:  - Bulky metastatic mediastinal  lymphadenopathy.  - Large metastatic soft tissue implant in the right shoulder.  - Small metastatic implant in the epicardial fat.  - Indeterminate right upper lobe pulmonary nodule.  - Bibasilar bronchial wall thickening and mucoid impaction, suggestive of an infectious/inflammatory process. Areas of interlobular septal thickening may reflect mild pulmonary edema. Background of emphysematous and chronic changes of the lungs.    Abdomen/pelvis:  - Large mass replacing the left kidney.  - Innumerable metastatic implants in the peritoneum, bilateral retroperitoneum, and mesentery.  - Numerous metastatic intramuscular and soft tissue implants.  - Small volume pelvic ascites.      Electronically Signed: Rodger Mckeon MD    6/25/2025 8:29 AM EDT    Workstation ID: [id number]    CT Head Without Contrast [[id number]] Collected: 06/25/25 0551     Updated: 06/25/25 0556    Narrative:      CT HEAD WO CONTRAST    Date of Exam: 6/25/2025 12:30 AM EDT    Indication: dizziness.    Comparison: None available.    Technique: Axial CT images were obtained of the head without contrast administration.  Coronal reconstructions were performed.  Automated exposure control and iterative reconstruction methods were used.      Findings:  There is moderate diffuse generalized atrophy. There are low-attenuation areas in the periventricular white matter consistent with chronic microvascular ischemic change. There is no mass, mass effect or midline shift. There are no abnormal extra-axial   fluid collections or areas of acute hemorrhage. The paranasal sinuses are clear. There is a right mastoid effusion with some destructive changes of the mastoid air cells consistent with mastoiditis. The left mastoid appears normal.          Impression:      Impression:  Atrophy and chronic microvascular ischemic change. No acute intracranial process. Right mastoiditis.          Electronically Signed: Mike García MD    6/25/2025 5:54 AM EDT    Workstation ID:  [id number]    US Renal Bilateral [[id number]] Collected: 06/25/25 0337     Updated: 06/25/25 0345    Narrative:      US RENAL BILATERAL    Date of Exam: 6/25/2025 3:21 AM EDT    Indication: baldomero on ckd.    Comparison: No comparisons available.    Technique: Grayscale and color Doppler ultrasound evaluation of the kidneys and urinary bladder was performed.      Findings:  The right kidney is 12 x 6.2 x 4.9 cm. There is no right-sided hydronephrosis. There is normal color blood flow to the right kidney. There is an incidental mass superior to the right kidney measuring up to at least 6.5 x 9.8 x 6.9 cm. Please correlate   with CT abdomen pelvis.    The left kidney measures 10.5 x 7.4 x 6.4 cm. There is mild left-sided upper pole hydronephrosis. There appears to be a complex mass of the lower pole of the left kidney which is up to at least 4.2 x 5.3 x 5.4 cm. There is normal color blood flow to the   left kidney.    The urinary bladder is incompletely distended and grossly normal.      Impression:      Impression:  Partially obstructing left renal collecting system from a left renal mass. Please correlate with CT abdomen/pelvis.            Electronically Signed: Mike García MD    6/25/2025 3:43 AM EDT    Workstation ID: [id number]    XR Chest 1 View [[id number]] Collected: 06/24/25 1750     Updated: 06/24/25 1755    Narrative:      XR CHEST 1 VW    Date of Exam: 6/24/2025 5:27 PM EDT    Indication: General Weakness elevated white count possible leukemia    Comparison: Chest radiograph 10/9/2010    Findings:  Vague reticulonodular airspace opacities most significant in the left upper and left lower lobes. No overt edema, large effusion or pneumothorax. Cardiomegaly. Prior median sternotomy. Aortic atherosclerotic disease. Degenerative related osseous change.      Impression:      Impression:  Vague reticulonodular airspace opacities suspicious for infectious/inflammatory process. Etiologies may include bronchiolitis,  aspiration or developing bronchopneumonia.      Electronically Signed: Andres Llanos MD    6/24/2025 5:53 PM EDT    Workstation ID: [id number]            EKG      I personally viewed and interpreted the patient's EKG/Telemetry data:    ECHOCARDIOGRAM:         STRESS MYOVIEW:       CARDIAC CATHETERIZATION:    No results found for this or any previous visit.       OTHER:         MDM      Atrial fibrilation  New onset  Intermittent RVR  Beta-blocker for rate control, increase as pressure allows   Lovenox for anticoagulation  [id number] score 4   Echocardiogram pending  TSH 1.49  K+ 3.3, replace electrolytes per protocol    Leukocytosis  WBC 48.57  Concern for leukemia  CT chest and a/p concerning for metastatic disease  Hematology/oncology consult    Lactic acidosis  Sepsis  WBC 48.57  3.3--4.9--4.0  Procal 0.30  Status post fluid bolus  IV antibiotics    History of coronary artery disease  Denies chest pain  Beta-blocker  Echocardiogram pending  Further ischemic workup pending echocardiogram results and hematology/oncology consult  Obtain lipid panel and A1c for risk stratification    History of hypertension  Hypotensive at admission  Improved status post IVF  Low-dose beta-blocker    SUZETTE on CKD  Creatinine 2.03--1.86  Consider nephrology consult if worsens    COPD  Tobacco abuse  Pulmicort  DuoNeb  IV steroids  Counseled complete cessation    Isabel Davila, RAAD  06/25/25  13:12 EDT

## 2025-06-25 NOTE — PROGRESS NOTES
LOS: 1 day   Patient Care Team:  Tomer Azevedo MD as PCP - General (Family Medicine)    Subjective     Interval History: Stable overnight    Patient Complaints: Denies any shortness of breath but is audibly wheezing and tachypneic    History taken from: patient    Review of Systems   Constitutional:  Positive for activity change, appetite change and unexpected weight change.   HENT:  Negative for congestion and facial swelling.    Eyes:  Negative for visual disturbance.   Respiratory:  Positive for cough, shortness of breath and wheezing.    Cardiovascular:  Negative for chest pain, palpitations and leg swelling.   Gastrointestinal:  Positive for constipation. Negative for abdominal pain and diarrhea.   Genitourinary:  Negative for dysuria.   Musculoskeletal:  Positive for arthralgias and gait problem.   Neurological:  Negative for dizziness, light-headedness and headaches.   Psychiatric/Behavioral:  Negative for confusion.            Objective     Vital Signs  Temp:  [97.8 °F (36.6 °C)-98.9 °F (37.2 °C)] 97.8 °F (36.6 °C)  Heart Rate:  [] 95  Resp:  [15-26] 23  BP: ()/(47-81) 130/68    Physical Exam:     General Appearance:    Alert, cooperative, in no acute distress, chronically ill-appearing, malnourished, hard of hearing   Head:    Normocephalic, without obvious abnormality, atraumatic   Eyes:            Lids and lashes normal, conjunctivae and sclerae normal, no   icterus, no pallor, corneas clear, PERRLA   Ears:    Ears appear intact with no abnormalities noted   Throat:   No oral lesions, no thrush, oral mucosa moist   Neck:   No adenopathy, supple, trachea midline, no thyromegaly, no   carotid bruit, no JVD   Lungs:   Diffuse rhonchi and wheezing    Heart:  Irregularly irregular   Chest Wall:    No abnormalities observed   Abdomen:     Normal bowel sounds, no masses, no organomegaly, soft        Non-tender non-distended, no guarding,   Extremities:   Moves all extremities well, no edema, no  "cyanosis, no             Redness   Pulses:   Pulses palpable and equal bilaterally   Skin:   No bleeding, bruising or rash   Lymph nodes:   No palpable adenopathy   Neurologic:   Cranial nerves 2 - 12 grossly intact, sensation intact, DTR       present and equal bilaterally        Results Review:    Lab Results (last 24 hours)       Procedure Component Value Units Date/Time    Folate [[id number]]  (Abnormal) Collected: 06/24/25 1720    Specimen: Blood Updated: 06/25/25 1100     Folate 3.28 ng/mL     Narrative:      Results may be falsely increased if patient taking Biotin.      Vitamin B12 [[id number]]  (Normal) Collected: 06/24/25 1720    Specimen: Blood Updated: 06/25/25 1100     Vitamin B-12 618 pg/mL     Narrative:      Results may be falsely increased if patient taking Biotin.      Procalcitonin [[id number]]  (Abnormal) Collected: 06/25/25 0000    Specimen: Blood from Arm, Right Updated: 06/25/25 0849     Procalcitonin 0.30 ng/mL     Narrative:      As a Marker for Sepsis (Non-Neonates):    1. <0.5 ng/mL represents a low risk of severe sepsis and/or septic shock.  2. >2 ng/mL represents a high risk of severe sepsis and/or septic shock.    As a Marker for Lower Respiratory Tract Infections that require antibiotic therapy:    PCT on Admission    Antibiotic Therapy       6-12 Hrs later    >0.5                Strongly Recommended  >0.25 - <0.5        Recommended   0.1 - 0.25          Discouraged              Remeasure/reassess PCT  <0.1                Strongly Discouraged     Remeasure/reassess PCT    As 28 day mortality risk marker: \"Change in Procalcitonin Result\" (>80% or <=80%) if Day 0 (or Day 1) and Day 4 values are available. Refer to http://[email or web address]s-pct-calculator.com    Change in PCT <=80%  A decrease of PCT levels below or equal to 80% defines a positive change in PCT test result representing a higher risk for 28-day all-cause mortality of patients diagnosed with severe sepsis for septic shock.    Change " in PCT >80%  A decrease of PCT levels of more than 80% defines a negative change in PCT result representing a lower risk for 28-day all-cause mortality of patients diagnosed with severe sepsis or septic shock.       STAT Lactic Acid, Reflex [[id number]]  (Abnormal) Collected: 06/25/25 0342    Specimen: Blood from Arm, Left Updated: 06/25/25 0413     Lactate 4.0 mmol/L     C-reactive Protein [[id number]]  (Abnormal) Collected: 06/25/25 0000    Specimen: Blood from Arm, Right Updated: 06/25/25 0400     C-Reactive Protein 9.65 mg/dL     Legionella Antigen, Urine - Urine, Urine, Clean Catch [[id number]]  (Normal) Collected: 06/25/25 0336    Specimen: Urine, Clean Catch Updated: 06/25/25 0357     LEGIONELLA ANTIGEN, URINE Negative    S. Pneumo Ag Urine or CSF - Urine, Urine, Clean Catch [[id number]]  (Normal) Collected: 06/25/25 0336    Specimen: Urine, Clean Catch Updated: 06/25/25 0357     Strep Pneumo Ag Negative    Urinalysis, Microscopic Only - Urine, Clean Catch [[id number]]  (Abnormal) Collected: 06/25/25 0336    Specimen: Urine, Clean Catch Updated: 06/25/25 0349     RBC, UA 3-5 /HPF      WBC, UA 0-2 /HPF      Comment: Urine culture not indicated.        Bacteria, UA None Seen /HPF      Squamous Epithelial Cells, UA 0-2 /HPF      Hyaline Casts, UA 0-2 /LPF      Methodology Automated Microscopy    Urinalysis With Culture If Indicated - Urine, Clean Catch [[id number]]  (Abnormal) Collected: 06/25/25 0336    Specimen: Urine, Clean Catch Updated: 06/25/25 0345     Color, UA Yellow     Appearance, UA Clear     pH, UA 5.5     Specific Gravity, UA 1.023     Glucose, UA Negative     Ketones, UA Trace     Bilirubin, UA Negative     Blood, UA Negative     Protein,  mg/dL (2+)     Leuk Esterase, UA Negative     Nitrite, UA Negative     Urobilinogen, UA 2.0 E.U./dL    Narrative:      In absence of clinical symptoms, the presence of pyuria, bacteria, and/or nitrites on the urinalysis result does not correlate with infection.     MRSA Screen, PCR (Inpatient) - Swab, Nares [[id number]]  (Normal) Collected: 06/25/25 0146    Specimen: Swab from Nares Updated: 06/25/25 0310     MRSA PCR No MRSA Detected    Narrative:      The negative predictive value of this diagnostic test is high and should only be used to consider de-escalating anti-MRSA therapy. A positive result may indicate colonization with MRSA and must be correlated clinically.    Sedimentation Rate [[id number]]  (Abnormal) Collected: 06/25/25 0000    Specimen: Blood from Arm, Right Updated: 06/25/25 0251     Sed Rate 31 mm/hr     STAT Lactic Acid, Reflex [[id number]]  (Abnormal) Collected: 06/25/25 0000    Specimen: Blood from Arm, Right Updated: 06/25/25 0044     Lactate 4.9 mmol/L     Basic Metabolic Panel [[id number]]  (Abnormal) Collected: 06/25/25 0000    Specimen: Blood from Arm, Right Updated: 06/25/25 0043     Glucose 104 mg/dL      BUN 26.7 mg/dL      Creatinine 1.86 mg/dL      Sodium 137 mmol/L      Potassium 3.3 mmol/L      Comment: Specimen hemolyzed.  Result may be falsely elevated.        Chloride 104 mmol/L      CO2 18.9 mmol/L      Calcium 8.7 mg/dL      BUN/Creatinine Ratio 14.4     Anion Gap 14.1 mmol/L      eGFR 35.5 mL/min/1.73     Narrative:      GFR Categories in Chronic Kidney Disease (CKD)              GFR Category          GFR (mL/min/1.73)    Interpretation  G1                    90 or greater        Normal or high (1)  G2                    60-89                Mild decrease (1)  G3a                   45-59                Mild to moderate decrease  G3b                   30-44                Moderate to severe decrease  G4                    15-29                Severe decrease  G5                    14 or less           Kidney failure    (1)In the absence of evidence of kidney disease, neither GFR category G1 or G2 fulfill the criteria for CKD.    eGFR calculation 2021 CKD-EPI creatinine equation, which does not include race as a factor    Uric Acid  [[id number]]  (Abnormal) Collected: 06/25/25 0000    Specimen: Blood from Arm, Right Updated: 06/25/25 0034     Uric Acid 8.4 mg/dL     CBC & Differential [[id number]]  (Abnormal) Collected: 06/25/25 0000    Specimen: Blood from Arm, Right Updated: 06/25/25 0013    Narrative:      The following orders were created for panel order CBC & Differential.  Procedure                               Abnormality         Status                     ---------                               -----------         ------                     CBC Auto Differential[[id number]]        Abnormal            Final result               Scan Slide[[id number]]                                                                    Please view results for these tests on the individual orders.    CBC Auto Differential [[id number]]  (Abnormal) Collected: 06/25/25 0000    Specimen: Blood from Arm, Right Updated: 06/25/25 0013     WBC 48.57 10*3/mm3      RBC 3.64 10*6/mm3      Hemoglobin 10.5 g/dL      Hematocrit 34.0 %      MCV 93.4 fL      MCH 28.8 pg      MCHC 30.9 g/dL      RDW 17.2 %      RDW-SD 57.7 fl      MPV 9.5 fL      Platelets 312 10*3/mm3      Neutrophil % 83.7 %      Lymphocyte % 6.5 %      Monocyte % 4.2 %      Eosinophil % 2.9 %      Basophil % 0.4 %      Immature Grans % 2.3 %      Neutrophils, Absolute 40.62 10*3/mm3      Lymphocytes, Absolute 3.17 10*3/mm3      Monocytes, Absolute 2.06 10*3/mm3      Eosinophils, Absolute 1.42 10*3/mm3      Basophils, Absolute 0.17 10*3/mm3      Immature Grans, Absolute 1.13 10*3/mm3      nRBC 0.0 /100 WBC     Procalcitonin [[id number]]  (Abnormal) Collected: 06/24/25 2213    Specimen: Blood Updated: 06/24/25 2248     Procalcitonin 0.28 ng/mL     Narrative:      As a Marker for Sepsis (Non-Neonates):    1. <0.5 ng/mL represents a low risk of severe sepsis and/or septic shock.  2. >2 ng/mL represents a high risk of severe sepsis and/or septic shock.    As a Marker for Lower Respiratory Tract Infections that  "require antibiotic therapy:    PCT on Admission    Antibiotic Therapy       6-12 Hrs later    >0.5                Strongly Recommended  >0.25 - <0.5        Recommended   0.1 - 0.25          Discouraged              Remeasure/reassess PCT  <0.1                Strongly Discouraged     Remeasure/reassess PCT    As 28 day mortality risk marker: \"Change in Procalcitonin Result\" (>80% or <=80%) if Day 0 (or Day 1) and Day 4 values are available. Refer to http://[email or web address]-pct-[email or web address].com    Change in PCT <=80%  A decrease of PCT levels below or equal to 80% defines a positive change in PCT test result representing a higher risk for 28-day all-cause mortality of patients diagnosed with severe sepsis for septic shock.    Change in PCT >80%  A decrease of PCT levels of more than 80% defines a negative change in PCT result representing a lower risk for 28-day all-cause mortality of patients diagnosed with severe sepsis or septic shock.       Iron Profile w/o Ferritin [[id number]]  (Abnormal) Collected: 06/24/25 1720    Specimen: Blood Updated: 06/24/25 2158     Iron 38 mcg/dL      Iron Saturation (TSAT) 19 %      Transferrin 137 mg/dL      TIBC 204 mcg/dL     Ferritin [[id number]]  (Abnormal) Collected: 06/24/25 1720    Specimen: Blood Updated: 06/24/25 2158     Ferritin 667.00 ng/mL     Narrative:      Results may be falsely decreased if patient taking Biotin.      STAT Lactic Acid, Reflex [[id number]]  (Abnormal) Collected: 06/24/25 1954    Specimen: Blood Updated: 06/24/25 2017     Lactate 3.4 mmol/L     CBC & Differential [[id number]]  (Abnormal) Collected: 06/24/25 1720    Specimen: Blood Updated: 06/24/25 1801    Narrative:      The following orders were created for panel order CBC & Differential.  Procedure                               Abnormality         Status                     ---------                               -----------         ------                     CBC Auto Differential[[id number]]        Abnormal     "        Final result               Scan Slide[[id number]]                                       Final result                 Please view results for these tests on the individual orders.    CBC Auto Differential [[id number]]  (Abnormal) Collected: 06/24/25 1720    Specimen: Blood Updated: 06/24/25 1801     WBC 48.42 10*3/mm3      RBC 3.44 10*6/mm3      Hemoglobin 9.9 g/dL      Hematocrit 32.1 %      MCV 93.3 fL      MCH 28.8 pg      MCHC 30.8 g/dL      RDW 17.1 %      RDW-SD 57.1 fl      MPV 9.5 fL      Platelets 315 10*3/mm3     Narrative:      The previously reported component NRBC is no longer being reported. Previous result was 0.0 /100 WBC (Reference Range: 0.0-0.2 /100 WBC) on 6/24/2025 at 1737 EDT.    Scan Slide [[id number]] Collected: 06/24/25 1720    Specimen: Blood Updated: 06/24/25 1801     Scan Slide --     Comment: See Manual Differential Results       Manual Differential [[id number]]  (Abnormal) Collected: 06/24/25 1720    Specimen: Blood Updated: 06/24/25 1801     Neutrophil % 92.0 %      Lymphocyte % 4.0 %      Monocyte % 2.0 %      Eosinophil % 2.0 %      Neutrophils Absolute 44.55 10*3/mm3      Lymphocytes Absolute 1.94 10*3/mm3      Monocytes Absolute 0.97 10*3/mm3      Eosinophils Absolute 0.97 10*3/mm3      RBC Morphology Normal     WBC Morphology Normal     Platelet Estimate Adequate    Comprehensive Metabolic Panel [[id number]]  (Abnormal) Collected: 06/24/25 1720    Specimen: Blood Updated: 06/24/25 1757     Glucose 108 mg/dL      BUN 28.8 mg/dL      Creatinine 2.03 mg/dL      Sodium 140 mmol/L      Potassium 3.3 mmol/L      Chloride 105 mmol/L      CO2 20.0 mmol/L      Calcium 9.0 mg/dL      Total Protein 6.3 g/dL      Albumin 2.9 g/dL      ALT (SGPT) 6 U/L      AST (SGOT) 23 U/L      Alkaline Phosphatase 124 U/L      Total Bilirubin 0.7 mg/dL      Globulin 3.4 gm/dL      A/G Ratio 0.9 g/dL      BUN/Creatinine Ratio 14.2     Anion Gap 15.0 mmol/L      eGFR 31.9 mL/min/1.73     Narrative:      GFR  Categories in Chronic Kidney Disease (CKD)              GFR Category          GFR (mL/min/1.73)    Interpretation  G1                    90 or greater        Normal or high (1)  G2                    60-89                Mild decrease (1)  G3a                   45-59                Mild to moderate decrease  G3b                   30-44                Moderate to severe decrease  G4                    15-29                Severe decrease  G5                    14 or less           Kidney failure    (1)In the absence of evidence of kidney disease, neither GFR category G1 or G2 fulfill the criteria for CKD.    eGFR calculation 2021 CKD-EPI creatinine equation, which does not include race as a factor    Magnesium [[id number]]  (Normal) Collected: 06/24/25 1720    Specimen: Blood Updated: 06/24/25 1757     Magnesium 2.3 mg/dL     CK [[id number]]  (Normal) Collected: 06/24/25 1720    Specimen: Blood Updated: 06/24/25 1757     Creatine Kinase 46 U/L     TSH Rfx On Abnormal To Free T4 [[id number]]  (Normal) Collected: 06/24/25 1720    Specimen: Blood Updated: 06/24/25 1757     TSH 1.490 uIU/mL     Extra Tubes [[id number]] Collected: 06/24/25 1720    Specimen: Blood Updated: 06/24/25 1730    Narrative:      The following orders were created for panel order Extra Tubes.  Procedure                               Abnormality         Status                     ---------                               -----------         ------                     Gold Top - SST[[id number]]                                   Final result               Light Blue Top[[id number]]                                   Final result                 Please view results for these tests on the individual orders.    Gold Top - SST [[id number]] Collected: 06/24/25 1720    Specimen: Blood Updated: 06/24/25 1730     Extra Tube Hold for add-ons.     Comment: Auto resulted.       Light Blue Top [[id number]] Collected: 06/24/25 1720    Specimen: Blood Updated: 06/24/25 1730      Extra Tube Hold for add-ons.     Comment: Auto resulted       POC Lactate [[id number]]  (Abnormal) Collected: 06/24/25 1724    Specimen: Blood Updated: 06/24/25 1726     Lactate 3.3 mmol/L      Comment: Serial Number: [id number]:  [id number]       Blood Culture - Blood, Arm, Left [[id number]] Collected: 06/24/25 1720    Specimen: Blood from Arm, Left Updated: 06/24/25 1725    Blood Culture - Blood, Hand, Left [[id number]] Collected: 06/24/25 1720    Specimen: Blood from Hand, Left Updated: 06/24/25 1725             Imaging Results (Last 24 Hours)       Procedure Component Value Units Date/Time    CT Chest Without Contrast Diagnostic [[id number]] Collected: 06/25/25 0739     Updated: 06/25/25 0831    Narrative:      CT ABDOMEN PELVIS WO CONTRAST, CT CHEST WO CONTRAST DIAGNOSTIC    Date of Exam: 6/25/2025 12:30 AM EDT    Indication: unexplained weight loss, left hip pain and leg shortening.    Comparison: Renal ultrasound same date.    Technique: Axial CT images were obtained of the chest, abdomen, and pelvis without the administration of contrast. Sagittal and coronal reconstructions were performed.  Automated exposure control and iterative reconstruction methods were used.    Findings:  Motion degraded exam.    CHEST  There is bulky lymphadenopathy throughout the mediastinum; for reference, a right precarinal lymph node conglomerate measures 5.6 x 5.3 cm (series 2 image 41). There may be hilar lymphadenopathy, though is difficult to measure by noncontrast technique.   There is a soft tissue implant at the right shoulder measuring 7.4 cm in greatest dimension, partially extending outside the field-of-view. Soft tissue nodule in the epicardial fat near the left ventricle measuring 2.4 x 1.7 cm (series 2 image 67).    Heart is enlarged. Normal caliber atherosclerotic thoracic aorta. CABG with calcifications of the native coronary arteries. Aortic valve and mitral annulus calcification. No pericardial  effusion.    Mild diffuse chest wall edema. Sternotomy. Advanced multilevel spondylosis. Bones appear demineralized. No distinct marrow replacing lesion.    Small volume mucous/debris in the right mainstem bronchus. Bronchial wall thickening and mucoid impaction in the right greater than left lower lobes. Biapical paraseptal emphysematous change. Mild biapical and bibasilar interlobular septal thickening.   Nodule in the peripheral right upper lobe measuring 12 x 10 mm (series 2 image 45). Benign calcified granuloma in the left lobe. Right greater than left lower lobe cystic change, favor chronic. No lobar consolidation. Minimal pleural fluid bilaterally.   No pneumothorax.    ABDOMEN/PELVIS  No distinct evidence of focal liver lesion on this noncontrast exam. Cholelithiasis without evidence of acute cholecystitis. No distinct biliary ductal dilatation. Generalized pancreatic parenchymal atrophy, likely senescent change. No findings of acute   pancreatitis. Spleen is normal in size. Thickening of the bilateral adrenal glands.    Large mass involving the left kidney appears to displace the majority of the normal parenchyma and also obstruct the upper pole, difficult to measure by noncontrast exam but spans at least 8.6 x 5.9 cm in axial dimension (series 2 image 57). Streak   artifact from left hip hardware obscures the pelvis, though the urinary bladder appears grossly unremarkable and there is likely prostatomegaly.    Colonic diverticulosis without evidence of acute diverticulitis. No evidence of bowel obstruction. Left inguinal hernia containing fat and fluid. Small volume pelvic free fluid.    Multiple large intramuscular and soft tissue implants, for example involving the left perineal soft tissues, left gluteal muscles, left abdominal wall muscles, left paraspinal muscles, and right iliacus muscle, all of which measure at least 5 cm. For   reference, the soft tissue implant in the left perineum measures 10.0 x  7.5 cm (series 2 image 150).    Innumerable peritoneal, bilateral retroperitoneal, and mesenteric metastatic implants, many which are large. Peritoneal implant spans the abdomen in the right upper quadrant to the pelvis in the left lower quadrant as well as the space of Retzius. For   reference, a right lower quadrant implant measures 8.2 x 7.5 cm (series 2 image 75) and a mesenteric implant measures 6.2 x 5.1 cm (series 2 image 65). There is an implant involving a loop of small bowel in the left lower quadrant (series 2 image 91),   without bowel obstruction.    Atherosclerosis. Ectasia of the infrarenal abdominal aorta without aneurysmal dilatation. Mild diffuse body wall edema. Total left hip arthroplasty with surrounding streak artifact. Advanced multilevel spondylosis. Bones appear demineralized. No distinct   marrow replacing lesion.      Impression:      Impression:  Please note this is a motion-degraded, noncontrast exam which makes assessment somewhat suboptimal. Within these confines, there is widely metastatic malignancy throughout the chest, abdomen, and pelvis as summarized below. Recommend tissue sampling.    Chest:  - Bulky metastatic mediastinal lymphadenopathy.  - Large metastatic soft tissue implant in the right shoulder.  - Small metastatic implant in the epicardial fat.  - Indeterminate right upper lobe pulmonary nodule.  - Bibasilar bronchial wall thickening and mucoid impaction, suggestive of an infectious/inflammatory process. Areas of interlobular septal thickening may reflect mild pulmonary edema. Background of emphysematous and chronic changes of the lungs.    Abdomen/pelvis:  - Large mass replacing the left kidney.  - Innumerable metastatic implants in the peritoneum, bilateral retroperitoneum, and mesentery.  - Numerous metastatic intramuscular and soft tissue implants.  - Small volume pelvic ascites.      Electronically Signed: Rodger Mckeon MD    6/25/2025 8:29 AM EDT    Workstation ID:  [id number]    CT Abdomen Pelvis Without Contrast [[id number]] Collected: 06/25/25 0739     Updated: 06/25/25 0831    Narrative:      CT ABDOMEN PELVIS WO CONTRAST, CT CHEST WO CONTRAST DIAGNOSTIC    Date of Exam: 6/25/2025 12:30 AM EDT    Indication: unexplained weight loss, left hip pain and leg shortening.    Comparison: Renal ultrasound same date.    Technique: Axial CT images were obtained of the chest, abdomen, and pelvis without the administration of contrast. Sagittal and coronal reconstructions were performed.  Automated exposure control and iterative reconstruction methods were used.    Findings:  Motion degraded exam.    CHEST  There is bulky lymphadenopathy throughout the mediastinum; for reference, a right precarinal lymph node conglomerate measures 5.6 x 5.3 cm (series 2 image 41). There may be hilar lymphadenopathy, though is difficult to measure by noncontrast technique.   There is a soft tissue implant at the right shoulder measuring 7.4 cm in greatest dimension, partially extending outside the field-of-view. Soft tissue nodule in the epicardial fat near the left ventricle measuring 2.4 x 1.7 cm (series 2 image 67).    Heart is enlarged. Normal caliber atherosclerotic thoracic aorta. CABG with calcifications of the native coronary arteries. Aortic valve and mitral annulus calcification. No pericardial effusion.    Mild diffuse chest wall edema. Sternotomy. Advanced multilevel spondylosis. Bones appear demineralized. No distinct marrow replacing lesion.    Small volume mucous/debris in the right mainstem bronchus. Bronchial wall thickening and mucoid impaction in the right greater than left lower lobes. Biapical paraseptal emphysematous change. Mild biapical and bibasilar interlobular septal thickening.   Nodule in the peripheral right upper lobe measuring 12 x 10 mm (series 2 image 45). Benign calcified granuloma in the left lobe. Right greater than left lower lobe cystic change, favor chronic. No lobar  consolidation. Minimal pleural fluid bilaterally.   No pneumothorax.    ABDOMEN/PELVIS  No distinct evidence of focal liver lesion on this noncontrast exam. Cholelithiasis without evidence of acute cholecystitis. No distinct biliary ductal dilatation. Generalized pancreatic parenchymal atrophy, likely senescent change. No findings of acute   pancreatitis. Spleen is normal in size. Thickening of the bilateral adrenal glands.    Large mass involving the left kidney appears to displace the majority of the normal parenchyma and also obstruct the upper pole, difficult to measure by noncontrast exam but spans at least 8.6 x 5.9 cm in axial dimension (series 2 image 57). Streak   artifact from left hip hardware obscures the pelvis, though the urinary bladder appears grossly unremarkable and there is likely prostatomegaly.    Colonic diverticulosis without evidence of acute diverticulitis. No evidence of bowel obstruction. Left inguinal hernia containing fat and fluid. Small volume pelvic free fluid.    Multiple large intramuscular and soft tissue implants, for example involving the left perineal soft tissues, left gluteal muscles, left abdominal wall muscles, left paraspinal muscles, and right iliacus muscle, all of which measure at least 5 cm. For   reference, the soft tissue implant in the left perineum measures 10.0 x 7.5 cm (series 2 image 150).    Innumerable peritoneal, bilateral retroperitoneal, and mesenteric metastatic implants, many which are large. Peritoneal implant spans the abdomen in the right upper quadrant to the pelvis in the left lower quadrant as well as the space of Retzius. For   reference, a right lower quadrant implant measures 8.2 x 7.5 cm (series 2 image 75) and a mesenteric implant measures 6.2 x 5.1 cm (series 2 image 65). There is an implant involving a loop of small bowel in the left lower quadrant (series 2 image 91),   without bowel obstruction.    Atherosclerosis. Ectasia of the infrarenal  abdominal aorta without aneurysmal dilatation. Mild diffuse body wall edema. Total left hip arthroplasty with surrounding streak artifact. Advanced multilevel spondylosis. Bones appear demineralized. No distinct   marrow replacing lesion.      Impression:      Impression:  Please note this is a motion-degraded, noncontrast exam which makes assessment somewhat suboptimal. Within these confines, there is widely metastatic malignancy throughout the chest, abdomen, and pelvis as summarized below. Recommend tissue sampling.    Chest:  - Bulky metastatic mediastinal lymphadenopathy.  - Large metastatic soft tissue implant in the right shoulder.  - Small metastatic implant in the epicardial fat.  - Indeterminate right upper lobe pulmonary nodule.  - Bibasilar bronchial wall thickening and mucoid impaction, suggestive of an infectious/inflammatory process. Areas of interlobular septal thickening may reflect mild pulmonary edema. Background of emphysematous and chronic changes of the lungs.    Abdomen/pelvis:  - Large mass replacing the left kidney.  - Innumerable metastatic implants in the peritoneum, bilateral retroperitoneum, and mesentery.  - Numerous metastatic intramuscular and soft tissue implants.  - Small volume pelvic ascites.      Electronically Signed: Rodger Mckeon MD    6/25/2025 8:29 AM EDT    Workstation ID: [id number]    CT Head Without Contrast [[id number]] Collected: 06/25/25 0551     Updated: 06/25/25 0556    Narrative:      CT HEAD WO CONTRAST    Date of Exam: 6/25/2025 12:30 AM EDT    Indication: dizziness.    Comparison: None available.    Technique: Axial CT images were obtained of the head without contrast administration.  Coronal reconstructions were performed.  Automated exposure control and iterative reconstruction methods were used.      Findings:  There is moderate diffuse generalized atrophy. There are low-attenuation areas in the periventricular white matter consistent with chronic microvascular  ischemic change. There is no mass, mass effect or midline shift. There are no abnormal extra-axial   fluid collections or areas of acute hemorrhage. The paranasal sinuses are clear. There is a right mastoid effusion with some destructive changes of the mastoid air cells consistent with mastoiditis. The left mastoid appears normal.          Impression:      Impression:  Atrophy and chronic microvascular ischemic change. No acute intracranial process. Right mastoiditis.          Electronically Signed: Mike García MD    6/25/2025 5:54 AM EDT    Workstation ID: [id number]    US Renal Bilateral [[id number]] Collected: 06/25/25 0337     Updated: 06/25/25 0345    Narrative:      US RENAL BILATERAL    Date of Exam: 6/25/2025 3:21 AM EDT    Indication: baldomero on ckd.    Comparison: No comparisons available.    Technique: Grayscale and color Doppler ultrasound evaluation of the kidneys and urinary bladder was performed.      Findings:  The right kidney is 12 x 6.2 x 4.9 cm. There is no right-sided hydronephrosis. There is normal color blood flow to the right kidney. There is an incidental mass superior to the right kidney measuring up to at least 6.5 x 9.8 x 6.9 cm. Please correlate   with CT abdomen pelvis.    The left kidney measures 10.5 x 7.4 x 6.4 cm. There is mild left-sided upper pole hydronephrosis. There appears to be a complex mass of the lower pole of the left kidney which is up to at least 4.2 x 5.3 x 5.4 cm. There is normal color blood flow to the   left kidney.    The urinary bladder is incompletely distended and grossly normal.      Impression:      Impression:  Partially obstructing left renal collecting system from a left renal mass. Please correlate with CT abdomen/pelvis.            Electronically Signed: Mike García MD    6/25/2025 3:43 AM EDT    Workstation ID: [id number]    XR Chest 1 View [[id number]] Collected: 06/24/25 1750     Updated: 06/24/25 1755    Narrative:      XR CHEST 1 VW    Date of Exam:  6/24/2025 5:27 PM EDT    Indication: General Weakness elevated white count possible leukemia    Comparison: Chest radiograph 10/9/2010    Findings:  Vague reticulonodular airspace opacities most significant in the left upper and left lower lobes. No overt edema, large effusion or pneumothorax. Cardiomegaly. Prior median sternotomy. Aortic atherosclerotic disease. Degenerative related osseous change.      Impression:      Impression:  Vague reticulonodular airspace opacities suspicious for infectious/inflammatory process. Etiologies may include bronchiolitis, aspiration or developing bronchopneumonia.      Electronically Signed: Andres Llanos MD    6/24/2025 5:53 PM EDT    Workstation ID: [id number]                 I reviewed the patient's new clinical results.    Medication Review:   Scheduled Meds:[Held by provider] amLODIPine, 5 mg, Oral, Q24H  [Held by provider] citalopram, 40 mg, Oral, Daily  [Held by provider] cloNIDine, 0.2 mg, Oral, Q24H  enoxaparin sodium, 1 mg/kg, Subcutaneous, Q24H  metoprolol tartrate, 12.5 mg, Oral, Q12H  nicotine, 1 patch, Transdermal, Daily  pantoprazole, 40 mg, Oral, Q AM  piperacillin-tazobactam, 3.375 g, Intravenous, Q8H  sodium chloride, 10 mL, Intravenous, Q12H      Continuous Infusions:Pharmacy to Dose enoxaparin (LOVENOX),   Pharmacy To Dose:,   sodium chloride, 100 mL/hr, Last Rate: 100 mL/hr (06/25/25 0158)      PRN Meds:.  acetaminophen    senna-docusate sodium **AND** polyethylene glycol **AND** bisacodyl **AND** bisacodyl    Calcium Replacement - Follow Nurse / BPA Driven Protocol    HYDROcodone-acetaminophen    ipratropium-albuterol    Magnesium Standard Dose Replacement - Follow Nurse / BPA Driven Protocol    nitroglycerin    ondansetron ODT **OR** ondansetron    Pharmacy to Dose enoxaparin (LOVENOX)    Pharmacy To Dose:    Phosphorus Replacement - Follow Nurse / BPA Driven Protocol    Potassium Replacement - Follow Nurse / BPA Driven Protocol    [COMPLETED] Insert  Peripheral IV **AND** sodium chloride    sodium chloride    sodium chloride     Assessment & Plan       SUZETTE (acute kidney injury)    Hypertension    Hyperlipidemia    Coronary artery disease    Hypokalemia    Leukocytosis    Elevated lactic acid level    Anemia    Sepsis, unspecified organism    Weakness    Constipation    Falls frequently    Dizziness    Hard of hearing    Body mass index (BMI) of 19.0 to 19.9 in adult    Cigarette smoker    CKD (chronic kidney disease) stage 3, GFR 30-59 ml/min    Atrial fibrillation    Left hip pain    -Patient appears to have widely metastatic disease.  Primary may be logic, given extensive lymphadenopathy and leukocytosis.  Oncology has been consulted.  Will need biopsy, likely of minimal easily accessible lymph nodes.  - Atrial fib-I believe this is new onset.  Rate is controlled with metoprolol.  He has been started on Lovenox.  - Lactic acidosis-multifactorial related to kidney injury, possible sepsis, received aggressive fluid hydration overnight; recheck lactic this afternoon.  Avoiding additional fluid boluses given that his hypotension has resolved and we are unsure of his underlying LV function..  Zosyn empirically  - Coronary artery disease-no anginal symptoms; no ischemic workup planned in  - Acute kidney injury-creatinine trending down with hydration; replacing potassium  - copd - solumedrol, budesonide, Duonebs- nicotine replacement      CODE Status:    Code status (Patient has no pulse and is not breathing):  CPR (Attempt to Resuscitate)  Medical Interventions (Patient has pulse or is breathing):  Full support  Level of support discussed with:  Patient    Admission status:  I believe this patient meets inpatient status  Expected length of stay:  2 midnights or greater  I discussed the patient's findings and my recommendations with the patient.    Plan for disposition:KATHERINE Collazo MD  06/25/25  12:08 EDT

## 2025-06-25 NOTE — PLAN OF CARE
Goal Outcome Evaluation:  Plan of Care Reviewed With: patient           Outcome Evaluation: Pt arrived to PCU from ED AAOx4. WBC elevated at 48.42 along with lactic at 3.3. Sepsis protocol started in ED. Per pt he has not taken any of his home meds other than the metoprolol for over a month. Pt also stated he has had to manually disimpacted himself but does not recall the last time he had to do that. Lactic redraw 4.9, on call notified, fluids given again along with Zosyn. Lactic is now 4.0. EKG ordered for tachycardia, EKG showed A-fib, bigeminy, and right bundle branch block. No complaints at this time.

## 2025-06-26 ENCOUNTER — APPOINTMENT (OUTPATIENT)
Dept: INTERVENTIONAL RADIOLOGY/VASCULAR | Facility: HOSPITAL | Age: 85
End: 2025-06-26
Payer: MEDICARE

## 2025-06-26 PROBLEM — E43 SEVERE PROTEIN-CALORIE MALNUTRITION: Status: ACTIVE | Noted: 2025-06-26

## 2025-06-26 LAB
ANION GAP SERPL CALCULATED.3IONS-SCNC: 21.2 MMOL/L (ref 5–15)
APTT PPP: 30.1 SECONDS (ref 22.7–35.4)
BASOPHILS # BLD AUTO: 0.19 10*3/MM3 (ref 0–0.2)
BASOPHILS NFR BLD AUTO: 0.3 % (ref 0–1.5)
BUN SERPL-MCNC: 29.3 MG/DL (ref 8–23)
BUN/CREAT SERPL: 15 (ref 7–25)
CALCIUM SPEC-SCNC: 8.6 MG/DL (ref 8.6–10.5)
CHLORIDE SERPL-SCNC: 107 MMOL/L (ref 98–107)
CO2 SERPL-SCNC: 11.8 MMOL/L (ref 22–29)
CREAT SERPL-MCNC: 1.95 MG/DL (ref 0.76–1.27)
DEPRECATED RDW RBC AUTO: 61.6 FL (ref 37–54)
EGFRCR SERPLBLD CKD-EPI 2021: 33.5 ML/MIN/1.73
EOSINOPHIL # BLD AUTO: 0 10*3/MM3 (ref 0–0.4)
EOSINOPHIL NFR BLD AUTO: 0 % (ref 0.3–6.2)
ERYTHROCYTE [DISTWIDTH] IN BLOOD BY AUTOMATED COUNT: 17.8 % (ref 12.3–15.4)
GLUCOSE SERPL-MCNC: 160 MG/DL (ref 65–99)
HAPTOGLOB SERPL-MCNC: 232 MG/DL (ref 30–200)
HCT VFR BLD AUTO: 29.3 % (ref 37.5–51)
HEMOCCULT STL QL IA: NEGATIVE
HGB BLD-MCNC: 9 G/DL (ref 13–17.7)
IMM GRANULOCYTES # BLD AUTO: 2.96 10*3/MM3 (ref 0–0.05)
IMM GRANULOCYTES NFR BLD AUTO: 4.3 % (ref 0–0.5)
INR PPP: 1.49 (ref 0.9–1.1)
LYMPHOCYTES # BLD AUTO: 1.21 10*3/MM3 (ref 0.7–3.1)
LYMPHOCYTES NFR BLD AUTO: 1.8 % (ref 19.6–45.3)
MCH RBC QN AUTO: 29.6 PG (ref 26.6–33)
MCHC RBC AUTO-ENTMCNC: 30.7 G/DL (ref 31.5–35.7)
MCV RBC AUTO: 96.4 FL (ref 79–97)
MONOCYTES # BLD AUTO: 0.59 10*3/MM3 (ref 0.1–0.9)
MONOCYTES NFR BLD AUTO: 0.9 % (ref 5–12)
NEUTROPHILS NFR BLD AUTO: 63.79 10*3/MM3 (ref 1.7–7)
NEUTROPHILS NFR BLD AUTO: 92.7 % (ref 42.7–76)
NRBC BLD AUTO-RTO: 0 /100 WBC (ref 0–0.2)
PLATELET # BLD AUTO: 266 10*3/MM3 (ref 140–450)
PMV BLD AUTO: 9.8 FL (ref 6–12)
POTASSIUM SERPL-SCNC: 4.1 MMOL/L (ref 3.5–5.2)
PROTHROMBIN TIME: 18 SECONDS (ref 11.7–14.2)
QT INTERVAL: 425 MS
QTC INTERVAL: 545 MS
RBC # BLD AUTO: 3.04 10*6/MM3 (ref 4.14–5.8)
RETICS # AUTO: 0.08 10*6/MM3 (ref 0.02–0.13)
RETICS/RBC NFR AUTO: 2.46 % (ref 0.7–1.9)
SODIUM SERPL-SCNC: 140 MMOL/L (ref 136–145)
URATE SERPL-MCNC: 8.5 MG/DL (ref 3.4–7)
WBC NRBC COR # BLD AUTO: 68.74 10*3/MM3 (ref 3.4–10.8)

## 2025-06-26 PROCEDURE — 25010000002 LIDOCAINE 1 % SOLUTION

## 2025-06-26 PROCEDURE — 94664 DEMO&/EVAL PT USE INHALER: CPT

## 2025-06-26 PROCEDURE — 94799 UNLISTED PULMONARY SVC/PX: CPT

## 2025-06-26 PROCEDURE — 84550 ASSAY OF BLOOD/URIC ACID: CPT | Performed by: STUDENT IN AN ORGANIZED HEALTH CARE EDUCATION/TRAINING PROGRAM

## 2025-06-26 PROCEDURE — 82274 ASSAY TEST FOR BLOOD FECAL: CPT

## 2025-06-26 PROCEDURE — 88342 IMHCHEM/IMCYTCHM 1ST ANTB: CPT | Performed by: STUDENT IN AN ORGANIZED HEALTH CARE EDUCATION/TRAINING PROGRAM

## 2025-06-26 PROCEDURE — 85730 THROMBOPLASTIN TIME PARTIAL: CPT | Performed by: STUDENT IN AN ORGANIZED HEALTH CARE EDUCATION/TRAINING PROGRAM

## 2025-06-26 PROCEDURE — 0KBL3ZX EXCISION OF LEFT ABDOMEN MUSCLE, PERCUTANEOUS APPROACH, DIAGNOSTIC: ICD-10-PCS | Performed by: RADIOLOGY

## 2025-06-26 PROCEDURE — 99232 SBSQ HOSP IP/OBS MODERATE 35: CPT

## 2025-06-26 PROCEDURE — 25010000002 FENTANYL CITRATE (PF) 50 MCG/ML SOLUTION

## 2025-06-26 PROCEDURE — 85610 PROTHROMBIN TIME: CPT | Performed by: STUDENT IN AN ORGANIZED HEALTH CARE EDUCATION/TRAINING PROGRAM

## 2025-06-26 PROCEDURE — 88305 TISSUE EXAM BY PATHOLOGIST: CPT | Performed by: STUDENT IN AN ORGANIZED HEALTH CARE EDUCATION/TRAINING PROGRAM

## 2025-06-26 PROCEDURE — 83010 ASSAY OF HAPTOGLOBIN QUANT: CPT | Performed by: INTERNAL MEDICINE

## 2025-06-26 PROCEDURE — 85025 COMPLETE CBC W/AUTO DIFF WBC: CPT

## 2025-06-26 PROCEDURE — 76942 ECHO GUIDE FOR BIOPSY: CPT

## 2025-06-26 PROCEDURE — 25010000002 METHYLPREDNISOLONE PER 125 MG: Performed by: INTERNAL MEDICINE

## 2025-06-26 PROCEDURE — 88341 IMHCHEM/IMCYTCHM EA ADD ANTB: CPT | Performed by: STUDENT IN AN ORGANIZED HEALTH CARE EDUCATION/TRAINING PROGRAM

## 2025-06-26 PROCEDURE — 85045 AUTOMATED RETICULOCYTE COUNT: CPT | Performed by: INTERNAL MEDICINE

## 2025-06-26 PROCEDURE — 80048 BASIC METABOLIC PNL TOTAL CA: CPT

## 2025-06-26 PROCEDURE — 25810000003 SODIUM CHLORIDE 0.9 % SOLUTION

## 2025-06-26 PROCEDURE — 25010000002 ONDANSETRON PER 1 MG

## 2025-06-26 PROCEDURE — 93005 ELECTROCARDIOGRAM TRACING: CPT

## 2025-06-26 PROCEDURE — 88333 PATH CONSLTJ SURG CYTO XM 1: CPT | Performed by: STUDENT IN AN ORGANIZED HEALTH CARE EDUCATION/TRAINING PROGRAM

## 2025-06-26 PROCEDURE — 25010000002 PIPERACILLIN SOD-TAZOBACTAM PER 1 G

## 2025-06-26 PROCEDURE — 93010 ELECTROCARDIOGRAM REPORT: CPT | Performed by: INTERNAL MEDICINE

## 2025-06-26 PROCEDURE — 94761 N-INVAS EAR/PLS OXIMETRY MLT: CPT

## 2025-06-26 RX ORDER — FENTANYL CITRATE 50 UG/ML
INJECTION, SOLUTION INTRAMUSCULAR; INTRAVENOUS AS NEEDED
Status: COMPLETED | OUTPATIENT
Start: 2025-06-26 | End: 2025-06-26

## 2025-06-26 RX ORDER — ONDANSETRON 2 MG/ML
INJECTION INTRAMUSCULAR; INTRAVENOUS AS NEEDED
Status: COMPLETED | OUTPATIENT
Start: 2025-06-26 | End: 2025-06-26

## 2025-06-26 RX ORDER — LIDOCAINE HYDROCHLORIDE 10 MG/ML
INJECTION, SOLUTION INFILTRATION; PERINEURAL AS NEEDED
Status: COMPLETED | OUTPATIENT
Start: 2025-06-26 | End: 2025-06-26

## 2025-06-26 RX ADMIN — FENTANYL CITRATE 50 MCG: 50 INJECTION, SOLUTION INTRAMUSCULAR; INTRAVENOUS at 15:34

## 2025-06-26 RX ADMIN — Medication 10 ML: at 20:19

## 2025-06-26 RX ADMIN — Medication 12.5 MG: at 09:39

## 2025-06-26 RX ADMIN — PIPERACILLIN AND TAZOBACTAM 3.38 G: 3; .375 INJECTION, POWDER, FOR SOLUTION INTRAVENOUS at 09:37

## 2025-06-26 RX ADMIN — SENNOSIDES AND DOCUSATE SODIUM 2 TABLET: 50; 8.6 TABLET ORAL at 20:05

## 2025-06-26 RX ADMIN — SODIUM CHLORIDE 100 ML/HR: 9 INJECTION, SOLUTION INTRAVENOUS at 09:42

## 2025-06-26 RX ADMIN — SENNOSIDES AND DOCUSATE SODIUM 2 TABLET: 50; 8.6 TABLET ORAL at 09:39

## 2025-06-26 RX ADMIN — PANTOPRAZOLE SODIUM 40 MG: 40 TABLET, DELAYED RELEASE ORAL at 07:01

## 2025-06-26 RX ADMIN — PIPERACILLIN AND TAZOBACTAM 3.38 G: 3; .375 INJECTION, POWDER, FOR SOLUTION INTRAVENOUS at 01:37

## 2025-06-26 RX ADMIN — LIDOCAINE HYDROCHLORIDE 6 ML: 10 INJECTION, SOLUTION INFILTRATION; PERINEURAL at 15:35

## 2025-06-26 RX ADMIN — METHYLPREDNISOLONE SODIUM SUCCINATE 60 MG: 125 INJECTION, POWDER, FOR SOLUTION INTRAMUSCULAR; INTRAVENOUS at 01:37

## 2025-06-26 RX ADMIN — IPRATROPIUM BROMIDE AND ALBUTEROL SULFATE 3 ML: .5; 3 SOLUTION RESPIRATORY (INHALATION) at 07:51

## 2025-06-26 RX ADMIN — PIPERACILLIN AND TAZOBACTAM 3.38 G: 3; .375 INJECTION, POWDER, FOR SOLUTION INTRAVENOUS at 18:04

## 2025-06-26 RX ADMIN — HYDROCODONE BITARTRATE AND ACETAMINOPHEN 1 TABLET: 10; 325 TABLET ORAL at 20:05

## 2025-06-26 RX ADMIN — BUDESONIDE 0.5 MG: 0.5 SUSPENSION RESPIRATORY (INHALATION) at 07:51

## 2025-06-26 RX ADMIN — POLYETHYLENE GLYCOL 3350 17 G: 17 POWDER, FOR SOLUTION ORAL at 09:39

## 2025-06-26 RX ADMIN — Medication 10 ML: at 01:38

## 2025-06-26 RX ADMIN — METHYLPREDNISOLONE SODIUM SUCCINATE 60 MG: 125 INJECTION, POWDER, FOR SOLUTION INTRAMUSCULAR; INTRAVENOUS at 13:01

## 2025-06-26 RX ADMIN — Medication 10 ML: at 09:39

## 2025-06-26 RX ADMIN — ONDANSETRON 4 MG: 2 INJECTION INTRAMUSCULAR; INTRAVENOUS at 15:30

## 2025-06-26 RX ADMIN — NICOTINE 1 PATCH: 21 PATCH, EXTENDED RELEASE TRANSDERMAL at 09:41

## 2025-06-26 RX ADMIN — Medication 12.5 MG: at 20:05

## 2025-06-26 NOTE — PROGRESS NOTES
Ras gave permission for IR procedure and signed consent with floor nurse. Pt has intermittent confusion per floor nurse

## 2025-06-26 NOTE — DISCHARGE PLACEMENT REQUEST
"Martinez Negro (84 y.o. Male)       Date of Birth   1940    Social Security Number       Address   [address] [place] [address]    Home Phone   [phone]    MRN   [id number]       [facility]    Marital Status                               Admission Date   6/24/2025    Admission Type   Emergency    Admitting Provider   Ema Collazo MD    Attending Provider   Ema Collazo MD    Department, Room/Bed   [facility], 2132/1       Discharge Date       Discharge Disposition       Discharge Destination                                 Attending Provider: Ema Collazo MD    Allergies: No Known Allergies    Isolation: None   Infection: None   Code Status: CPR    Ht: 177.8 cm (70\")   Wt: 65.3 kg (143 lb 15.4 oz)    Admission Cmt: None   Principal Problem: SUZETTE (acute kidney injury) [N17.9]                   Active Insurance as of 6/24/2025       Primary Coverage       Payor Plan Insurance Group Employer/Plan Group    ANTHEM MEDICARE REPLACEMENT ANTHEM MEDICARE ADVANTAGE PPO [id number]       Payor Plan Address Payor Plan Phone Number Payor Plan Fax Number Effective Dates    [address] [phone]  1/1/2024 - None Entered    [facility] [address]         Subscriber Name Subscriber Birth Date Member ID       MARTINEZ NEGRO 1940 [id number]                     Emergency Contacts        (Rel.) Home Phone Work Phone Mobile Phone    Martinez Macdonald (step-son) (Other) -- -- [phone]    Sonya Macdonald (step-son) (Other) -- -- [phone]    Mumtaz Macdonald (step-son) (Other) -- -- [phone]    Portia Hassan (step-daughter) -- -- [phone]                "

## 2025-06-26 NOTE — PLAN OF CARE
Goal Outcome Evaluation:  Plan of Care Reviewed With: patient        Progress: no change  Outcome Evaluation: WBC trending up. Constipation; needs BM. IR to perform biopsy today.

## 2025-06-26 NOTE — PROGRESS NOTES
CARDIOLOGY PROGRESS NOTE:    Jeremias Negro  84 y.o.  male  1940  [id number]      Referring Provider: Dr. Collazo    Reason for follow-up: New onset atrial fibrillation, prolonged QTc     Patient Care Team:  Tomer Azevedo MD as PCP - General (Family Medicine)    Subjective .  Patient seen and examined.  Labs reviewed.  Chart reviewed.  Patient reports he is doing well from cardiology standpoint as he denies chest pain, shortness of breath, palpitations, edema.  He notes mild abdominal pain.     Objective  Patient lying in bed resting comfortably on 2 LNC     Review of Systems   Constitutional: Positive for malaise/fatigue. Negative for chills and fever.   Cardiovascular:  Negative for chest pain, dyspnea on exertion, leg swelling, palpitations and syncope.   Respiratory:  Positive for cough. Negative for shortness of breath.    Gastrointestinal:  Positive for abdominal pain. Negative for nausea and vomiting.   Neurological:  Negative for dizziness, light-headedness and weakness.   Psychiatric/Behavioral:  Negative for altered mental status.        Allergies: Patient has no known allergies.    Scheduled Meds:[Held by provider] amLODIPine, 5 mg, Oral, Q24H  budesonide, 0.5 mg, Nebulization, BID - RT  [Held by provider] citalopram, 40 mg, Oral, Daily  [Held by provider] cloNIDine, 0.2 mg, Oral, Q24H  [Held by provider] enoxaparin sodium, 1 mg/kg, Subcutaneous, Q24H  ipratropium-albuterol, 3 mL, Nebulization, TID  methylPREDNISolone sodium succinate, 60 mg, Intravenous, Q12H  metoprolol tartrate, 12.5 mg, Oral, Q12H  nicotine, 1 patch, Transdermal, Daily  pantoprazole, 40 mg, Oral, Q AM  piperacillin-tazobactam, 3.375 g, Intravenous, Q8H  polyethylene glycol, 17 g, Oral, Daily  senna-docusate sodium, 2 tablet, Oral, BID  sodium chloride, 10 mL, Intravenous, Q12H      Continuous Infusions:Pharmacy to Dose enoxaparin (LOVENOX),   Pharmacy To Dose:,   sodium chloride, 100 mL/hr, Last Rate: 100 mL/hr (06/25/25  "0158)      PRN Meds:.  acetaminophen    senna-docusate sodium **AND** polyethylene glycol **AND** bisacodyl **AND** bisacodyl    Calcium Replacement - Follow Nurse / BPA Driven Protocol    HYDROcodone-acetaminophen    ipratropium-albuterol    Magnesium Standard Dose Replacement - Follow Nurse / BPA Driven Protocol    nitroglycerin    ondansetron ODT **OR** ondansetron    Pharmacy to Dose enoxaparin (LOVENOX)    Pharmacy To Dose:    Phosphorus Replacement - Follow Nurse / BPA Driven Protocol    Potassium Replacement - Follow Nurse / BPA Driven Protocol    [COMPLETED] Insert Peripheral IV **AND** sodium chloride    sodium chloride    sodium chloride        VITAL SIGNS  Vitals:    06/26/25 0751 06/26/25 0754 06/26/25 0755 06/26/25 0833   BP:    124/76   BP Location:    Right arm   Patient Position:    Lying   Pulse: 99 98 98 102   Resp: 20 18 20 24   Temp:    97.5 °F (36.4 °C)   TempSrc:    Oral   SpO2: 96% 96% 96% 97%   Weight:       Height:           Flowsheet Rows      Flowsheet Row First Filed Value   Admission Height 177.8 cm (70\") Documented at 06/24/2025 1604   Admission Weight 59 kg (130 lb) Documented at 06/24/2025 1604             TELEMETRY: Sinus rhythm    Physical Exam:  Vitals reviewed.   Constitutional:       Appearance: Not in distress.   Eyes:      Pupils: Pupils are equal, round, and reactive to light.   HENT:    Mouth/Throat:      Pharynx: Oropharynx is clear.   Pulmonary:      Effort: Pulmonary effort is normal.      Breath sounds: Rhonchi present.   Cardiovascular:      Normal rate. Regular rhythm.      Murmurs: There is a systolic murmur.   Pulses:     Intact distal pulses.   Abdominal:      Palpations: Abdomen is soft.   Musculoskeletal: Normal range of motion.      Cervical back: Neck supple. Skin:     General: Skin is warm and dry.   Neurological:      General: No focal deficit present.      Mental Status: Alert and oriented to person, place and time.          Results Review:   I reviewed the " patient's new clinical results.  Lab Results (last 24 hours)       Procedure Component Value Units Date/Time    Uric Acid [[id number]]  (Abnormal) Collected: 06/26/25 0428    Specimen: Blood from Arm, Left Updated: 06/26/25 0612     Uric Acid 8.5 mg/dL     Basic Metabolic Panel [[id number]]  (Abnormal) Collected: 06/26/25 0428    Specimen: Blood from Arm, Left Updated: 06/26/25 0518     Glucose 160 mg/dL      BUN 29.3 mg/dL      Creatinine 1.95 mg/dL      Sodium 140 mmol/L      Potassium 4.1 mmol/L      Chloride 107 mmol/L      CO2 11.8 mmol/L      Calcium 8.6 mg/dL      BUN/Creatinine Ratio 15.0     Anion Gap 21.2 mmol/L      eGFR 33.5 mL/min/1.73     Narrative:      GFR Categories in Chronic Kidney Disease (CKD)              GFR Category          GFR (mL/min/1.73)    Interpretation  G1                    90 or greater        Normal or high (1)  G2                    60-89                Mild decrease (1)  G3a                   45-59                Mild to moderate decrease  G3b                   30-44                Moderate to severe decrease  G4                    15-29                Severe decrease  G5                    14 or less           Kidney failure    (1)In the absence of evidence of kidney disease, neither GFR category G1 or G2 fulfill the criteria for CKD.    eGFR calculation 2021 CKD-EPI creatinine equation, which does not include race as a factor    Haptoglobin [[id number]] Collected: 06/26/25 0428    Specimen: Blood from Arm, Left Updated: 06/26/25 0433    Occult Blood, Fecal By Immunoassay - Stool, Per Rectum [[id number]]  (Normal) Collected: 06/26/25 0307    Specimen: Stool from Per Rectum Updated: 06/26/25 0346     Occult Blood, Fecal by Immunoassay Negative    Protime-INR [[id number]]  (Abnormal) Collected: 06/26/25 0148    Specimen: Blood Updated: 06/26/25 0228     Protime 18.0 Seconds      INR 1.49    aPTT [[id number]]  (Normal) Collected: 06/26/25 0148    Specimen: Blood Updated: 06/26/25 0228      PTT 30.1 seconds     CBC & Differential [[id number]]  (Abnormal) Collected: 06/26/25 0148    Specimen: Blood Updated: 06/26/25 0215    Narrative:      The following orders were created for panel order CBC & Differential.  Procedure                               Abnormality         Status                     ---------                               -----------         ------                     CBC Auto Differential[[id number]]        Abnormal            Final result               Scan Slide[[id number]]                                                                    Please view results for these tests on the individual orders.    CBC Auto Differential [[id number]]  (Abnormal) Collected: 06/26/25 0148    Specimen: Blood Updated: 06/26/25 0215     WBC 68.74 10*3/mm3      RBC 3.04 10*6/mm3      Hemoglobin 9.0 g/dL      Hematocrit 29.3 %      MCV 96.4 fL      MCH 29.6 pg      MCHC 30.7 g/dL      RDW 17.8 %      RDW-SD 61.6 fl      MPV 9.8 fL      Platelets 266 10*3/mm3      Neutrophil % 92.7 %      Lymphocyte % 1.8 %      Monocyte % 0.9 %      Eosinophil % 0.0 %      Basophil % 0.3 %      Immature Grans % 4.3 %      Neutrophils, Absolute 63.79 10*3/mm3      Lymphocytes, Absolute 1.21 10*3/mm3      Monocytes, Absolute 0.59 10*3/mm3      Eosinophils, Absolute 0.00 10*3/mm3      Basophils, Absolute 0.19 10*3/mm3      Immature Grans, Absolute 2.96 10*3/mm3      nRBC 0.0 /100 WBC     Reticulocytes [[id number]]  (Abnormal) Collected: 06/26/25 0148    Specimen: Blood Updated: 06/26/25 0208     Reticulocyte % 2.46 %      Reticulocyte Absolute 0.0758 10*6/mm3     Blood Culture - Blood, Arm, Left [[id number]]  (Normal) Collected: 06/24/25 1720    Specimen: Blood from Arm, Left Updated: 06/25/25 1730     Blood Culture No growth at 24 hours    Blood Culture - Blood, Hand, Left [[id number]]  (Normal) Collected: 06/24/25 1720    Specimen: Blood from Hand, Left Updated: 06/25/25 1730     Blood Culture No growth at 24 hours    Lactate  Dehydrogenase [[id number]]  (Abnormal) Collected: 06/25/25 0000    Specimen: Blood from Arm, Right Updated: 06/25/25 1702      U/L      Comment: Specimen hemolyzed.  Results may be affected.       Lactic Acid, Plasma [[id number]]  (Abnormal) Collected: 06/25/25 1429    Specimen: Blood Updated: 06/25/25 1458     Lactate 4.7 mmol/L     Lipid Panel [[id number]]  (Abnormal) Collected: 06/25/25 0000    Specimen: Blood from Arm, Right Updated: 06/25/25 1337     Total Cholesterol 105 mg/dL      Triglycerides 141 mg/dL      HDL Cholesterol 28 mg/dL      LDL Cholesterol  52 mg/dL      VLDL Cholesterol 25 mg/dL      LDL/HDL Ratio 1.74    Narrative:      Cholesterol Reference Ranges  (U.S. Department of Health and Human Services ATP III Classifications)    Desirable          <200 mg/dL  Borderline High    200-239 mg/dL  High Risk          >240 mg/dL      Triglyceride Reference Ranges  (U.S. Department of Health and Human Services ATP III Classifications)    Normal           <150 mg/dL  Borderline High  150-199 mg/dL  High             200-499 mg/dL  Very High        >500 mg/dL    HDL Reference Ranges  (U.S. Department of Health and Human Services ATP III Classifications)    Low     <40 mg/dl (major risk factor for CHD)  High    >60 mg/dl ('negative' risk factor for CHD)        LDL Reference Ranges  (U.S. Department of Health and Human Services ATP III Classifications)    Optimal          <100 mg/dL  Near Optimal     100-129 mg/dL  Borderline High  130-159 mg/dL  High             160-189 mg/dL  Very High        >189 mg/dL    LDL is calculated using the NIH LDL-C calculation.      Hemoglobin A1c [[id number]]  (Normal) Collected: 06/25/25 0000    Specimen: Blood from Arm, Right Updated: 06/25/25 1330     Hemoglobin A1C 5.36 %     Narrative:      Hemoglobin A1C Ranges:    Increased Risk for Diabetes  5.7% to 6.4%  Diabetes                     >= 6.5%  Diabetic Goal                < 7.0%    Folate [[id number]]  (Abnormal)  Collected: 06/24/25 1720    Specimen: Blood Updated: 06/25/25 1100     Folate 3.28 ng/mL     Narrative:      Results may be falsely increased if patient taking Biotin.      Vitamin B12 [[id number]]  (Normal) Collected: 06/24/25 1720    Specimen: Blood Updated: 06/25/25 1100     Vitamin B-12 618 pg/mL     Narrative:      Results may be falsely increased if patient taking Biotin.              Imaging Results (Last 24 Hours)       ** No results found for the last 24 hours. **            EKG      I personally viewed and interpreted the patient's EKG/Telemetry data:    ECHOCARDIOGRAM:  Results for orders placed during the hospital encounter of 06/24/25    Adult Transthoracic Echo Complete W/ Cont if Necessary Per Protocol    Interpretation Summary    Left ventricular ejection fraction appears to be 51 - 55%.    The right ventricular cavity is mildly dilated.    Severe aortic valve stenosis is present.    Estimated right ventricular systolic pressure from tricuspid regurgitation is normal (<35 mmHg).       STRESS MYOVIEW:       CARDIAC CATHETERIZATION:  No results found for this or any previous visit.       OTHER:         Assessment & Plan     Principal Problem:    SUZETTE (acute kidney injury)  Active Problems:    Hypertension    Hyperlipidemia    Coronary artery disease    Hypokalemia    Leukocytosis    Elevated lactic acid level    Anemia    Sepsis, unspecified organism    Weakness    Constipation    Falls frequently    Dizziness    Hard of hearing    Body mass index (BMI) of 19.0 to 19.9 in adult    Cigarette smoker    CKD (chronic kidney disease) stage 3, GFR 30-59 ml/min    Atrial fibrillation    Left hip pain       Atrial fibrilation  New onset  Intermittent RVR  Converted to sinus rhythm  Beta-blocker for rate control, increase as pressure allows   Lovenox for anticoagulation-- on hold for biopsy   [id number] score 4   Echocardiogram with LVEF 51 to 55%  TSH 1.49    Severe aortic stenosis  Echocardiogram with LVEF 51  to 55%, mildly dilated RV  Severe aortic valve stenosis present  Beta-blocker  Further workup for AS pending prognosis    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  Leukocytosis  WBC 48.57--68.47  CT chest and a/p concerning for metastatic disease  Hematology/oncology consult  IR consult for biopsy    Lactic acidosis  Sepsis  3.3--4.9--4.0--4.7  Procal 0.30  Status post fluid bolus  IV antibiotics     History of coronary artery disease  Denies chest pain  Beta-blocker  Further ischemic workup pending echocardiogram results and hematology/oncology consult  A1c 5.36  LDL 52     History of hypertension  Hypotensive at admission  Improved status post IVF  Low-dose beta-blocker     SUZETTE on CKD  Creatinine 1.95  Consider nephrology consult if worsens     COPD  Tobacco abuse  Pulmicort  DuoNeb  IV steroids  Counseled complete cessation        Isabel Davila, RAAD  06/26/25  08:59 EDT

## 2025-06-26 NOTE — PROGRESS NOTES
LOS: 2 days   Patient Care Team:  Tomer Azevedo MD as PCP - General (Family Medicine)    Subjective     Patient denies any complaints overnight    Review of Systems   Constitutional:  Positive for activity change.   HENT: Negative.     Respiratory: Negative.     Cardiovascular: Negative.    Gastrointestinal: Negative.    Genitourinary: Negative.    Musculoskeletal: Negative.    Neurological:  Positive for weakness.   Psychiatric/Behavioral: Negative.             Objective     Vital Signs  Temp:  [96.8 °F (36 °C)-98.2 °F (36.8 °C)] 97.5 °F (36.4 °C)  Heart Rate:  [] 102  Resp:  [18-26] 24  BP: ()/(54-84) 124/76      Physical Exam  Vitals reviewed.   Constitutional:       Appearance: He is not ill-appearing.   HENT:      Head: Normocephalic and atraumatic.      Right Ear: External ear normal.      Left Ear: External ear normal.      Nose: Nose normal.      Mouth/Throat:      Mouth: Mucous membranes are dry.   Eyes:      General:         Right eye: No discharge.         Left eye: No discharge.   Cardiovascular:      Rate and Rhythm: Rhythm irregular.      Pulses: Normal pulses.      Heart sounds: Normal heart sounds.   Pulmonary:      Breath sounds: Normal breath sounds.   Abdominal:      General: Bowel sounds are normal.      Palpations: Abdomen is soft.   Musculoskeletal:         General: Normal range of motion.      Cervical back: Normal range of motion.   Skin:     General: Skin is warm.   Neurological:      Mental Status: He is alert and oriented to person, place, and time.   Psychiatric:         Behavior: Behavior normal.              Results Review:    Lab Results (last 24 hours)       Procedure Component Value Units Date/Time    Uric Acid [[id number]]  (Abnormal) Collected: 06/26/25 0428    Specimen: Blood from Arm, Left Updated: 06/26/25 0612     Uric Acid 8.5 mg/dL     Basic Metabolic Panel [[id number]]  (Abnormal) Collected: 06/26/25 0428    Specimen: Blood from Arm, Left Updated: 06/26/25  0518     Glucose 160 mg/dL      BUN 29.3 mg/dL      Creatinine 1.95 mg/dL      Sodium 140 mmol/L      Potassium 4.1 mmol/L      Chloride 107 mmol/L      CO2 11.8 mmol/L      Calcium 8.6 mg/dL      BUN/Creatinine Ratio 15.0     Anion Gap 21.2 mmol/L      eGFR 33.5 mL/min/1.73     Narrative:      GFR Categories in Chronic Kidney Disease (CKD)              GFR Category          GFR (mL/min/1.73)    Interpretation  G1                    90 or greater        Normal or high (1)  G2                    60-89                Mild decrease (1)  G3a                   45-59                Mild to moderate decrease  G3b                   30-44                Moderate to severe decrease  G4                    15-29                Severe decrease  G5                    14 or less           Kidney failure    (1)In the absence of evidence of kidney disease, neither GFR category G1 or G2 fulfill the criteria for CKD.    eGFR calculation 2021 CKD-EPI creatinine equation, which does not include race as a factor    Haptoglobin [[id number]] Collected: 06/26/25 0428    Specimen: Blood from Arm, Left Updated: 06/26/25 0433    Occult Blood, Fecal By Immunoassay - Stool, Per Rectum [[id number]]  (Normal) Collected: 06/26/25 0307    Specimen: Stool from Per Rectum Updated: 06/26/25 0346     Occult Blood, Fecal by Immunoassay Negative    Protime-INR [[id number]]  (Abnormal) Collected: 06/26/25 0148    Specimen: Blood Updated: 06/26/25 0228     Protime 18.0 Seconds      INR 1.49    aPTT [[id number]]  (Normal) Collected: 06/26/25 0148    Specimen: Blood Updated: 06/26/25 0228     PTT 30.1 seconds     CBC & Differential [[id number]]  (Abnormal) Collected: 06/26/25 0148    Specimen: Blood Updated: 06/26/25 0215    Narrative:      The following orders were created for panel order CBC & Differential.  Procedure                               Abnormality         Status                     ---------                               -----------         ------                      CBC Auto Differential[[id number]]        Abnormal            Final result               Scan Slide[[id number]]                                                                    Please view results for these tests on the individual orders.    CBC Auto Differential [[id number]]  (Abnormal) Collected: 06/26/25 0148    Specimen: Blood Updated: 06/26/25 0215     WBC 68.74 10*3/mm3      RBC 3.04 10*6/mm3      Hemoglobin 9.0 g/dL      Hematocrit 29.3 %      MCV 96.4 fL      MCH 29.6 pg      MCHC 30.7 g/dL      RDW 17.8 %      RDW-SD 61.6 fl      MPV 9.8 fL      Platelets 266 10*3/mm3      Neutrophil % 92.7 %      Lymphocyte % 1.8 %      Monocyte % 0.9 %      Eosinophil % 0.0 %      Basophil % 0.3 %      Immature Grans % 4.3 %      Neutrophils, Absolute 63.79 10*3/mm3      Lymphocytes, Absolute 1.21 10*3/mm3      Monocytes, Absolute 0.59 10*3/mm3      Eosinophils, Absolute 0.00 10*3/mm3      Basophils, Absolute 0.19 10*3/mm3      Immature Grans, Absolute 2.96 10*3/mm3      nRBC 0.0 /100 WBC     Reticulocytes [[id number]]  (Abnormal) Collected: 06/26/25 0148    Specimen: Blood Updated: 06/26/25 0208     Reticulocyte % 2.46 %      Reticulocyte Absolute 0.0758 10*6/mm3     Blood Culture - Blood, Arm, Left [[id number]]  (Normal) Collected: 06/24/25 1720    Specimen: Blood from Arm, Left Updated: 06/25/25 1730     Blood Culture No growth at 24 hours    Blood Culture - Blood, Hand, Left [[id number]]  (Normal) Collected: 06/24/25 1720    Specimen: Blood from Hand, Left Updated: 06/25/25 1730     Blood Culture No growth at 24 hours    Lactate Dehydrogenase [[id number]]  (Abnormal) Collected: 06/25/25 0000    Specimen: Blood from Arm, Right Updated: 06/25/25 1702      U/L      Comment: Specimen hemolyzed.  Results may be affected.       Lactic Acid, Plasma [[id number]]  (Abnormal) Collected: 06/25/25 1429    Specimen: Blood Updated: 06/25/25 1458     Lactate 4.7 mmol/L     Lipid Panel [[id number]]  (Abnormal)  Collected: 06/25/25 0000    Specimen: Blood from Arm, Right Updated: 06/25/25 1337     Total Cholesterol 105 mg/dL      Triglycerides 141 mg/dL      HDL Cholesterol 28 mg/dL      LDL Cholesterol  52 mg/dL      VLDL Cholesterol 25 mg/dL      LDL/HDL Ratio 1.74    Narrative:      Cholesterol Reference Ranges  (U.S. Department of Health and Human Services ATP III Classifications)    Desirable          <200 mg/dL  Borderline High    200-239 mg/dL  High Risk          >240 mg/dL      Triglyceride Reference Ranges  (U.S. Department of Health and Human Services ATP III Classifications)    Normal           <150 mg/dL  Borderline High  150-199 mg/dL  High             200-499 mg/dL  Very High        >500 mg/dL    HDL Reference Ranges  (U.S. Department of Health and Human Services ATP III Classifications)    Low     <40 mg/dl (major risk factor for CHD)  High    >60 mg/dl ('negative' risk factor for CHD)        LDL Reference Ranges  (U.S. Department of Health and Human Services ATP III Classifications)    Optimal          <100 mg/dL  Near Optimal     100-129 mg/dL  Borderline High  130-159 mg/dL  High             160-189 mg/dL  Very High        >189 mg/dL    LDL is calculated using the NIH LDL-C calculation.      Hemoglobin A1c [[id number]]  (Normal) Collected: 06/25/25 0000    Specimen: Blood from Arm, Right Updated: 06/25/25 1330     Hemoglobin A1C 5.36 %     Narrative:      Hemoglobin A1C Ranges:    Increased Risk for Diabetes  5.7% to 6.4%  Diabetes                     >= 6.5%  Diabetic Goal                < 7.0%    Folate [[id number]]  (Abnormal) Collected: 06/24/25 1720    Specimen: Blood Updated: 06/25/25 1100     Folate 3.28 ng/mL     Narrative:      Results may be falsely increased if patient taking Biotin.      Vitamin B12 [[id number]]  (Normal) Collected: 06/24/25 1720    Specimen: Blood Updated: 06/25/25 1100     Vitamin B-12 618 pg/mL     Narrative:      Results may be falsely increased if patient taking Biotin.                Imaging Results (Last 24 Hours)       ** No results found for the last 24 hours. **                 I reviewed the patient's new clinical results.    Medication Review:   Scheduled Meds:[Held by provider] amLODIPine, 5 mg, Oral, Q24H  budesonide, 0.5 mg, Nebulization, BID - RT  [Held by provider] citalopram, 40 mg, Oral, Daily  [Held by provider] cloNIDine, 0.2 mg, Oral, Q24H  [Held by provider] enoxaparin sodium, 1 mg/kg, Subcutaneous, Q24H  ipratropium-albuterol, 3 mL, Nebulization, TID  methylPREDNISolone sodium succinate, 60 mg, Intravenous, Q12H  metoprolol tartrate, 12.5 mg, Oral, Q12H  nicotine, 1 patch, Transdermal, Daily  pantoprazole, 40 mg, Oral, Q AM  piperacillin-tazobactam, 3.375 g, Intravenous, Q8H  polyethylene glycol, 17 g, Oral, Daily  senna-docusate sodium, 2 tablet, Oral, BID  sodium chloride, 10 mL, Intravenous, Q12H      Continuous Infusions:Pharmacy to Dose enoxaparin (LOVENOX),   Pharmacy To Dose:,   sodium chloride, 100 mL/hr, Last Rate: 100 mL/hr (06/26/25 0942)      PRN Meds:.  acetaminophen    senna-docusate sodium **AND** polyethylene glycol **AND** bisacodyl **AND** bisacodyl    Calcium Replacement - Follow Nurse / BPA Driven Protocol    HYDROcodone-acetaminophen    ipratropium-albuterol    Magnesium Standard Dose Replacement - Follow Nurse / BPA Driven Protocol    nitroglycerin    ondansetron ODT **OR** ondansetron    Pharmacy to Dose enoxaparin (LOVENOX)    Pharmacy To Dose:    Phosphorus Replacement - Follow Nurse / BPA Driven Protocol    Potassium Replacement - Follow Nurse / BPA Driven Protocol    [COMPLETED] Insert Peripheral IV **AND** sodium chloride    sodium chloride    sodium chloride     Interval History:    Assessment & Plan     Sepsis, unspecified organism  Weakness  Leukocytosis  Elevated lactic acid level  Left hip pain  SUZETTE (acute kidney injury)  CKD (chronic kidney disease) stage 3, GFR 30-59 ml/min  Anemia  Hypertension  Atrial  fibrillation  Hyperlipidemia  Coronary artery disease  Hypokalemia  Constipation  Falls frequently  Dizziness  Hard of hearing  Body mass index (BMI) of 19.0 to 19.9 in adult  Cigarette smoker    Plan of care   Patient presented to the emergency department as instructed after his labs revealed a white count of 42,000.  Reports feeling unwell for several weeks.  He reports generalized weakness, low back pain and left hip without radiation down legs or loss of bladder or bowel.  Reports nausea decreased urine and constipation along with dizziness and multiple falls.He lives alone and does not routinely take medications as prescribed as noted full bottles of medications at bedside. He does intermittently take 100mg of Metoprolol.     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  - Patient appears to have widely metastatic disease  - Oncology follow  - Leukocytosis most likely related to mental status  - Fluid bolus/IV antibiotics    SUZETTE (acute kidney injury)  CKD (chronic kidney disease) stage 3, GFR 30-59 ml/min  Anemia  Hypokalemia  - Infection improving  - Monitor replace electrolytes as needed    Hypertension  Atrial fibrillation  Hyperlipidemia  Coronary artery disease  -Cardiology following  - Patient had new onset atrial fibrillation with RVR  - Continue beta-blocker/Lovenox for anticoagulation  - Normally hypertensive however patient has been-hypotensive with improvement with IV fluid  -Continue low-dose metoprolol hold other blood pressure medication    Constipation  Falls frequently  Dizziness  Hard of hearing  Body mass index (BMI) of 19.0 to 19.9 in adult  Cigarette smoker    Plan for disposition:RAAD Brock  06/26/25  10:22 EDT

## 2025-06-26 NOTE — CASE MANAGEMENT/SOCIAL WORK
Continued Stay Note  [facility]     Patient Name: Jeremias Negro  MRN: [id number]  Today's Date: 6/26/2025    Admit Date: 6/24/2025    Plan: [facility] referral pending acceptance. Will require precert and PASRR. From home alone.   Discharge Plan       Row Name 06/26/25 1413       Plan    Plan [facility] referral pending acceptance. Will require precert and PASRR. From home alone.    Patient/Family in Agreement with Plan yes    Provided Post Acute Provider List? N/A    N/A Provider List Comment was at [facility] recently    Plan Comments Patient MARIA ESTHER for a procedure. CM contacted patient's emergency contact, Jeremias Macdonald ([phone]). CM confirmed with Jeremias that himself, his brothers, Sonya and Mumtaz (numbers listed in chart), and his sister, Portia Hassan ([phone]) are all step children to the patient. Jeremias reports there has been no POA completed to his knowlegde. CM added Portia Hassan to contact list. CM discussed therapy recommendations for SNF, Jeremias reports patient was recently at [facility], family agreeable to referral, CM notified liaison. Barrier to D/C: IR biopsy today, IV abx, IV steroids, WBC monitoring (WBC 68.74).                    Expected Discharge Date and Time       Expected Discharge Date Expected Discharge Time    Jun 30, 2025           Phone communication or documentation only - no physical contact with patient or family.     ARABELLA WhyteN, [place], [place] [address]    Office: [phone]  Fax: [phone]

## 2025-06-27 LAB
ANION GAP SERPL CALCULATED.3IONS-SCNC: 17.7 MMOL/L (ref 5–15)
BUN SERPL-MCNC: 33.9 MG/DL (ref 8–23)
BUN/CREAT SERPL: 17.1 (ref 7–25)
BURR CELLS BLD QL SMEAR: ABNORMAL
CALCIUM SPEC-SCNC: 8.8 MG/DL (ref 8.6–10.5)
CHLORIDE SERPL-SCNC: 107 MMOL/L (ref 98–107)
CO2 SERPL-SCNC: 15.3 MMOL/L (ref 22–29)
CREAT SERPL-MCNC: 1.98 MG/DL (ref 0.76–1.27)
DACRYOCYTES BLD QL SMEAR: ABNORMAL
DEPRECATED RDW RBC AUTO: 62.9 FL (ref 37–54)
EGFRCR SERPLBLD CKD-EPI 2021: 32.9 ML/MIN/1.73
ERYTHROCYTE [DISTWIDTH] IN BLOOD BY AUTOMATED COUNT: 18.1 % (ref 12.3–15.4)
GLUCOSE SERPL-MCNC: 127 MG/DL (ref 65–99)
HCT VFR BLD AUTO: 29.6 % (ref 37.5–51)
HGB BLD-MCNC: 9 G/DL (ref 13–17.7)
LYMPHOCYTES # BLD MANUAL: 3.24 10*3/MM3 (ref 0.7–3.1)
MCH RBC QN AUTO: 29.3 PG (ref 26.6–33)
MCHC RBC AUTO-ENTMCNC: 30.4 G/DL (ref 31.5–35.7)
MCV RBC AUTO: 96.4 FL (ref 79–97)
METAMYELOCYTES NFR BLD MANUAL: 1 % (ref 0–0)
NEUTROPHILS # BLD AUTO: 76.86 10*3/MM3 (ref 1.7–7)
NEUTROPHILS NFR BLD MANUAL: 69 % (ref 42.7–76)
NEUTS BAND NFR BLD MANUAL: 26 % (ref 0–5)
PLATELET # BLD AUTO: 317 10*3/MM3 (ref 140–450)
PMV BLD AUTO: 9.5 FL (ref 6–12)
POIKILOCYTOSIS BLD QL SMEAR: ABNORMAL
POLYCHROMASIA BLD QL SMEAR: ABNORMAL
POTASSIUM SERPL-SCNC: 3.7 MMOL/L (ref 3.5–5.2)
RBC # BLD AUTO: 3.07 10*6/MM3 (ref 4.14–5.8)
SCAN SLIDE: NORMAL
SMALL PLATELETS BLD QL SMEAR: ADEQUATE
SODIUM SERPL-SCNC: 140 MMOL/L (ref 136–145)
VARIANT LYMPHS NFR BLD MANUAL: 4 % (ref 19.6–45.3)
WBC MORPH BLD: NORMAL
WBC NRBC COR # BLD AUTO: 80.91 10*3/MM3 (ref 3.4–10.8)

## 2025-06-27 PROCEDURE — 97116 GAIT TRAINING THERAPY: CPT

## 2025-06-27 PROCEDURE — 97110 THERAPEUTIC EXERCISES: CPT

## 2025-06-27 PROCEDURE — 85025 COMPLETE CBC W/AUTO DIFF WBC: CPT

## 2025-06-27 PROCEDURE — 94799 UNLISTED PULMONARY SVC/PX: CPT

## 2025-06-27 PROCEDURE — 94761 N-INVAS EAR/PLS OXIMETRY MLT: CPT

## 2025-06-27 PROCEDURE — 85007 BL SMEAR W/DIFF WBC COUNT: CPT

## 2025-06-27 PROCEDURE — 99232 SBSQ HOSP IP/OBS MODERATE 35: CPT

## 2025-06-27 PROCEDURE — 97535 SELF CARE MNGMENT TRAINING: CPT

## 2025-06-27 PROCEDURE — 97530 THERAPEUTIC ACTIVITIES: CPT

## 2025-06-27 PROCEDURE — 99232 SBSQ HOSP IP/OBS MODERATE 35: CPT | Performed by: STUDENT IN AN ORGANIZED HEALTH CARE EDUCATION/TRAINING PROGRAM

## 2025-06-27 PROCEDURE — 80048 BASIC METABOLIC PNL TOTAL CA: CPT

## 2025-06-27 PROCEDURE — 25010000002 METHYLPREDNISOLONE PER 125 MG: Performed by: INTERNAL MEDICINE

## 2025-06-27 PROCEDURE — 25010000002 PIPERACILLIN SOD-TAZOBACTAM PER 1 G

## 2025-06-27 PROCEDURE — 63710000001 ONDANSETRON ODT 4 MG TABLET DISPERSIBLE: Performed by: PHYSICIAN ASSISTANT

## 2025-06-27 PROCEDURE — 94664 DEMO&/EVAL PT USE INHALER: CPT

## 2025-06-27 RX ORDER — ENOXAPARIN SODIUM 100 MG/ML
1 INJECTION SUBCUTANEOUS EVERY 24 HOURS
Status: DISCONTINUED | OUTPATIENT
Start: 2025-06-28 | End: 2025-06-30

## 2025-06-27 RX ORDER — PREDNISONE 20 MG/1
20 TABLET ORAL
Status: COMPLETED | OUTPATIENT
Start: 2025-06-28 | End: 2025-06-29

## 2025-06-27 RX ADMIN — BUDESONIDE 0.5 MG: 0.5 SUSPENSION RESPIRATORY (INHALATION) at 07:49

## 2025-06-27 RX ADMIN — IPRATROPIUM BROMIDE AND ALBUTEROL SULFATE 3 ML: .5; 3 SOLUTION RESPIRATORY (INHALATION) at 07:49

## 2025-06-27 RX ADMIN — Medication 10 ML: at 08:24

## 2025-06-27 RX ADMIN — PIPERACILLIN AND TAZOBACTAM 3.38 G: 3; .375 INJECTION, POWDER, FOR SOLUTION INTRAVENOUS at 01:44

## 2025-06-27 RX ADMIN — METHYLPREDNISOLONE SODIUM SUCCINATE 60 MG: 125 INJECTION, POWDER, FOR SOLUTION INTRAMUSCULAR; INTRAVENOUS at 00:26

## 2025-06-27 RX ADMIN — PANTOPRAZOLE SODIUM 40 MG: 40 TABLET, DELAYED RELEASE ORAL at 08:23

## 2025-06-27 RX ADMIN — SENNOSIDES AND DOCUSATE SODIUM 2 TABLET: 50; 8.6 TABLET ORAL at 20:50

## 2025-06-27 RX ADMIN — Medication 12.5 MG: at 20:50

## 2025-06-27 RX ADMIN — PIPERACILLIN AND TAZOBACTAM 3.38 G: 3; .375 INJECTION, POWDER, FOR SOLUTION INTRAVENOUS at 17:21

## 2025-06-27 RX ADMIN — Medication 12.5 MG: at 08:23

## 2025-06-27 RX ADMIN — PIPERACILLIN AND TAZOBACTAM 3.38 G: 3; .375 INJECTION, POWDER, FOR SOLUTION INTRAVENOUS at 11:06

## 2025-06-27 RX ADMIN — NICOTINE 1 PATCH: 21 PATCH, EXTENDED RELEASE TRANSDERMAL at 08:24

## 2025-06-27 RX ADMIN — ONDANSETRON 4 MG: 4 TABLET, ORALLY DISINTEGRATING ORAL at 15:01

## 2025-06-27 NOTE — PROGRESS NOTES
Patient Name: Jeremias Negro  YOB: 1940  MRN: [id number]  Admission date: 6/24/2025  Reason for Encounter: Follow-up/Progress Note      [facility] Clinical Nutrition Progress Note       Nutrition Intervention Updates: Continue current po diet and ONS.    Continue to encourage good po intake.       Subjective: Checking in to monitor po diet tolerance and Nutrition POC. S/P biopsy with IR yesterday (6/27).        PO Diet: Diet: Regular/House; Fluid Consistency: Thin (IDDSI 0)   PO Supplements: Magic Cup BID   PO Intake:  0-100% - inconsistent intake       Current nutrition support: -   Nutrition support review: -       Labs: Reviewed. Management per attending.         GI Function:  + BM 6/27 (today)        Brief Weight Review:    Wt Readings from Last 1 Encounters:   06/27/25 0525 66.2 kg (145 lb 15.1 oz)   06/26/25 0338 65.3 kg (143 lb 15.4 oz)   06/25/25 0421 61.1 kg (134 lb 11.2 oz)   06/24/25 2025 59.6 kg (131 lb 6.3 oz)   06/24/25 1604 59 kg (130 lb)        Results from last 7 days   Lab Units 06/27/25  0030 06/26/25  0428 06/25/25  0000 06/24/25  1720   SODIUM mmol/L 140 140 137 140   POTASSIUM mmol/L 3.7 4.1 3.3* 3.3*   CHLORIDE mmol/L 107 107 104 105   CO2 mmol/L 15.3* 11.8* 18.9* 20.0*   BUN mg/dL 33.9* 29.3* 26.7* 28.8*   CREATININE mg/dL 1.98* 1.95* 1.86* 2.03*   CALCIUM mg/dL 8.8 8.6 8.7 9.0   BILIRUBIN mg/dL  --   --   --  0.7   ALK PHOS U/L  --   --   --  124*   ALT (SGPT) U/L  --   --   --  6   AST (SGOT) U/L  --   --   --  23   GLUCOSE mg/dL 127* 160* 104* 108*     Results from last 7 days   Lab Units 06/27/25  0030 06/26/25  0148 06/25/25  0000 06/24/25  1720   MAGNESIUM mg/dL  --   --   --  2.3   PLATELETS 10*3/mm3 317   < > 312 315   HEMOGLOBIN g/dL 9.0*   < > 10.5* 9.9*   HEMATOCRIT % 29.6*   < > 34.0* 32.1*   TRIGLYCERIDES mg/dL  --   --  141  --     < > = values in this interval not displayed.     Lab Results   Component Value Date    HGBA1C 5.36 06/25/2025       Electronically  signed by:  Melany Wilde RD  06/27/25 09:15 EDT

## 2025-06-27 NOTE — PLAN OF CARE
"Assessment: Jeremias Negro presents with ADL impairments affecting function including balance, cognition, and strength. Pt alert and attentive. Requires max assist to utilize urinal for toileting unsupported EOB. He ambulates with CGA and FWW. He is highly impulsive with decreased safety awareness requiring max verbal cueing throughout treatment session. Demonstrated functioning below baseline abilities indicate the need for continued skilled intervention while inpatient. Tolerating session today without incident. Will continue to follow and progress as tolerated.      Plan/Recommendations:   Moderate Intensity Therapy recommended post-acute care. This is recommended as therapy feels the patient would require 3-4 days per week and wouldn't tolerate \"3 hour daily\" rehab intensity. SNF would be the preferred choice. If the patient does not agree to SNF, arrange HH or OP depending on home bound status. If patient is medically complex, consider LTACH.     "

## 2025-06-27 NOTE — CASE MANAGEMENT/SOCIAL WORK
Social Work Assessment  [facility]     Patient Name: Jeremias Negro  MRN: [id number]  Today's Date: 6/27/2025    Admit Date: 6/24/2025     Discharge Plan       Row Name 06/27/25 1017       Plan    Plan [facility] referral pending acceptance. Will require precert. PASRR approved. From home alone.    Plan Comments CM updated on PASRR status.                  ISAAC FernandoW, LSW, CCM  Lead   Phone: [phone]  Cell: [phone]  Fax: [phone]  [email or web address]

## 2025-06-27 NOTE — PROGRESS NOTES
LOS: 3 days   Patient Care Team:  Tomer Azevedo MD as PCP - General (Family Medicine)    Subjective     Patient denies any complaints overnight    Review of Systems   Constitutional:  Positive for activity change.   HENT: Negative.     Respiratory: Negative.     Cardiovascular: Negative.    Gastrointestinal: Negative.    Genitourinary: Negative.    Musculoskeletal: Negative.    Neurological:  Positive for weakness.   Psychiatric/Behavioral: Negative.             Objective     Vital Signs  Temp:  [97.3 °F (36.3 °C)-98.2 °F (36.8 °C)] 97.6 °F (36.4 °C)  Heart Rate:  [105-120] 109  Resp:  [16-28] 18  BP: (111-131)/(66-80) 116/75      Physical Exam  Vitals reviewed.   Constitutional:       Appearance: He is not ill-appearing.   HENT:      Head: Normocephalic and atraumatic.      Right Ear: External ear normal.      Left Ear: External ear normal.      Nose: Nose normal.      Mouth/Throat:      Mouth: Mucous membranes are dry.   Eyes:      General:         Right eye: No discharge.         Left eye: No discharge.   Cardiovascular:      Rate and Rhythm: Rhythm irregular.      Pulses: Normal pulses.      Heart sounds: Normal heart sounds.   Pulmonary:      Breath sounds: Normal breath sounds.   Abdominal:      General: Bowel sounds are normal.      Palpations: Abdomen is soft.   Musculoskeletal:         General: Normal range of motion.      Cervical back: Normal range of motion.   Skin:     General: Skin is warm.   Neurological:      Mental Status: He is alert and oriented to person, place, and time.   Psychiatric:         Behavior: Behavior normal.              Results Review:    Lab Results (last 24 hours)       Procedure Component Value Units Date/Time    Tissue Pathology Exam [[id number]] Collected: 06/26/25 1537    Specimen: Tissue from Peritoneum Updated: 06/27/25 0749    CBC & Differential [[id number]]  (Abnormal) Collected: 06/27/25 0030    Specimen: Blood Updated: 06/27/25 0124    Narrative:      The following  orders were created for panel order CBC & Differential.  Procedure                               Abnormality         Status                     ---------                               -----------         ------                     CBC Auto Differential[[id number]]        Abnormal            Final result               Scan Slide[[id number]]                                       Final result                 Please view results for these tests on the individual orders.    CBC Auto Differential [[id number]]  (Abnormal) Collected: 06/27/25 0030    Specimen: Blood Updated: 06/27/25 0124     WBC 80.91 10*3/mm3      RBC 3.07 10*6/mm3      Hemoglobin 9.0 g/dL      Hematocrit 29.6 %      MCV 96.4 fL      MCH 29.3 pg      MCHC 30.4 g/dL      RDW 18.1 %      RDW-SD 62.9 fl      MPV 9.5 fL      Platelets 317 10*3/mm3     Narrative:      The previously reported component NRBC is no longer being reported. Previous result was 0.0 /100 WBC (Reference Range: 0.0-0.2 /100 WBC) on 6/27/2025 at 0039 EDT.    Scan Slide [[id number]] Collected: 06/27/25 0030    Specimen: Blood Updated: 06/27/25 0124     Scan Slide --     Comment: See Manual Differential Results       Manual Differential [[id number]]  (Abnormal) Collected: 06/27/25 0030    Specimen: Blood Updated: 06/27/25 0124     Neutrophil % 69.0 %      Lymphocyte % 4.0 %      Bands %  26.0 %      Metamyelocyte % 1.0 %      Neutrophils Absolute 76.86 10*3/mm3      Lymphocytes Absolute 3.24 10*3/mm3      Crenated RBC's Slight/1+     Dacrocytes Slight/1+     Poikilocytes Slight/1+     Polychromasia Slight/1+     WBC Morphology Normal     Platelet Estimate Adequate    Basic Metabolic Panel [[id number]]  (Abnormal) Collected: 06/27/25 0030    Specimen: Blood Updated: 06/27/25 0056     Glucose 127 mg/dL      BUN 33.9 mg/dL      Creatinine 1.98 mg/dL      Sodium 140 mmol/L      Potassium 3.7 mmol/L      Chloride 107 mmol/L      CO2 15.3 mmol/L      Calcium 8.8 mg/dL      BUN/Creatinine Ratio 17.1      Anion Gap 17.7 mmol/L      eGFR 32.9 mL/min/1.73     Narrative:      GFR Categories in Chronic Kidney Disease (CKD)              GFR Category          GFR (mL/min/1.73)    Interpretation  G1                    90 or greater        Normal or high (1)  G2                    60-89                Mild decrease (1)  G3a                   45-59                Mild to moderate decrease  G3b                   30-44                Moderate to severe decrease  G4                    15-29                Severe decrease  G5                    14 or less           Kidney failure    (1)In the absence of evidence of kidney disease, neither GFR category G1 or G2 fulfill the criteria for CKD.    eGFR calculation 2021 CKD-EPI creatinine equation, which does not include race as a factor    Blood Culture - Blood, Arm, Left [[id number]]  (Normal) Collected: 06/24/25 1720    Specimen: Blood from Arm, Left Updated: 06/26/25 1730     Blood Culture No growth at 2 days    Blood Culture - Blood, Hand, Left [[id number]]  (Normal) Collected: 06/24/25 1720    Specimen: Blood from Hand, Left Updated: 06/26/25 1730     Blood Culture No growth at 2 days    Haptoglobin [[id number]]  (Abnormal) Collected: 06/26/25 0428    Specimen: Blood from Arm, Left Updated: 06/26/25 1033     Haptoglobin 232 mg/dL      Comment: Specimen hemolyzed. Results may be affected.                Imaging Results (Last 24 Hours)       Procedure Component Value Units Date/Time    US Guided Soft Tissue/Muscle Biopsy [[id number]] Collected: 06/26/25 1605     Updated: 06/26/25 1613    Narrative:      DATE OF EXAM:  6/26/2025 3:10 PM EDT    PROCEDURE:  US GUIDED SOFT TISSUE/MUSCLE BIOPSY    INDICATIONS:  Soft tissue biopsy from peritoneal metastases; suspected metastatic malignancy    COMPARISON:  CT abdomen pelvis 6/25/2025    FLUOROSCOPIC TIME:  None    PHYSICIAN MONITORED CONSCIOUS SEDATION TIME:  Fentanyl only    TECHNIQUE:   A detailed explanation of the procedure, including  the risks, benefits, and alternatives was provided. Informed written consent was obtained from the patient. The interventional radiology nurse monitored the patient and all times and provided fentanyl   for analgesia. A preprocedure timeout was performed. The patient was placed supine on the bed. The left lower quadrant soft tissue mass was marked, prepped and draped using maximal sterile barrier technique. The skin and subcutaneous tissue was   anesthetized with 1% lidocaine. Next, utilizing ultrasound guidance, a 17-gauge coaxial needle was advanced to the mass. From this position, a total of 4 18-gauge core biopsies were obtained and given to the pathologist at bedside who noted the presence   of diagnostic tissue. Following the final pass, the needle was removed and hemostasis was achieved. A sterile bandage was applied. The patient tolerated the procedure well without immediate complication. The patient was transported back to the inpatient   unit in stable condition for recovery.    FINDINGS:  See above      Impression:      Successful ultrasound-guided core biopsy of left lower quadrant soft tissue mass.      Report dictated by: Raul Verde DNP      I have personally reviewed this case and agree with the findings above:    Electronically Signed: Angela Eason MD    6/26/2025 4:11 PM EDT    Workstation ID: [id number]                 I reviewed the patient's new clinical results.    Medication Review:   Scheduled Meds:[Held by provider] amLODIPine, 5 mg, Oral, Q24H  budesonide, 0.5 mg, Nebulization, BID - RT  [Held by provider] citalopram, 40 mg, Oral, Daily  [Held by provider] cloNIDine, 0.2 mg, Oral, Q24H  [START ON 6/28/2025] enoxaparin sodium, 1 mg/kg, Subcutaneous, Q24H  ipratropium-albuterol, 3 mL, Nebulization, TID  methylPREDNISolone sodium succinate, 60 mg, Intravenous, Q12H  metoprolol tartrate, 12.5 mg, Oral, Q12H  nicotine, 1 patch, Transdermal, Daily  pantoprazole, 40 mg, Oral, Q  AM  piperacillin-tazobactam, 3.375 g, Intravenous, Q8H  polyethylene glycol, 17 g, Oral, Daily  senna-docusate sodium, 2 tablet, Oral, BID  sodium chloride, 10 mL, Intravenous, Q12H      Continuous Infusions:Pharmacy to Dose enoxaparin (LOVENOX),   Pharmacy To Dose:,       PRN Meds:.  acetaminophen    senna-docusate sodium **AND** polyethylene glycol **AND** bisacodyl **AND** bisacodyl    Calcium Replacement - Follow Nurse / BPA Driven Protocol    HYDROcodone-acetaminophen    ipratropium-albuterol    Magnesium Standard Dose Replacement - Follow Nurse / BPA Driven Protocol    nitroglycerin    ondansetron ODT **OR** ondansetron    Pharmacy to Dose enoxaparin (LOVENOX)    Pharmacy To Dose:    Phosphorus Replacement - Follow Nurse / BPA Driven Protocol    Potassium Replacement - Follow Nurse / BPA Driven Protocol    [COMPLETED] Insert Peripheral IV **AND** sodium chloride    sodium chloride    sodium chloride     Interval History:    Assessment & Plan     Sepsis, unspecified organism  Weakness  Leukocytosis  Elevated lactic acid level  Left hip pain  SUZETTE (acute kidney injury)  CKD (chronic kidney disease) stage 3, GFR 30-59 ml/min  Anemia  Hypertension  Atrial fibrillation  Hyperlipidemia  Coronary artery disease  Hypokalemia  Constipation  Falls frequently  Dizziness  Hard of hearing  Body mass index (BMI) of 19.0 to 19.9 in adult  Cigarette smoker    Plan of care   Patient presented to the emergency department as instructed after his labs revealed a white count of 42,000.  Reports feeling unwell for several weeks.  He reports generalized weakness, low back pain and left hip without radiation down legs or loss of bladder or bowel.  Reports nausea decreased urine and constipation along with dizziness and multiple falls.He lives alone and does not routinely take medications as prescribed as noted full bottles of medications at bedside. He does intermittently take 100mg of Metoprolol.     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  - Patient appears to have widely metastatic disease  - Oncology following  - Leukocytosis most likely related to mental status  - Fluid bolus completed/IV antibiotics    SUZETTE (acute kidney injury)  CKD (chronic kidney disease) stage 3, GFR 30-59 ml/min  Anemia  Hypokalemia  - Infection improving  - Monitor replace electrolytes as needed    Hypertension  Atrial fibrillation  Hyperlipidemia  Coronary artery disease  -Cardiology following  - Patient had new onset atrial fibrillation with RVR  - Continue beta-blocker/Lovenox for anticoagulation  - Normally hypertensive however patient has been-hypotensive with improvement with IV fluid  -Continue low-dose metoprolol hold other blood pressure medication    Constipation  Falls frequently  Dizziness  Hard of hearing  Body mass index (BMI) of 19.0 to 19.9 in adult  Cigarette smoker    Plan for disposition:RAAD Nichols  06/27/25  10:24 EDT

## 2025-06-27 NOTE — PROGRESS NOTES
Hematology/Oncology Inpatient Consultation    Patient name: Jeremias Negro  : 1940  MRN: [id number]  Referring Provider: Dr. Vale MANUEL  Reason for Consultation: Metastatic malignancy, leukocytosis     Chief complaint: Weakness     History of present illness:    Jeremias Negro is a 84 y.o. male with past medical history significant for CAD s/p CABG, HTN, HLD, current smoker who presented to [facility] on 2025 after abnormal blood counts noted by PCP.  He was instructed to come to the hospital after having a white blood cell count of 42,000.  He states he has been feeling unwell for several weeks with generalized weakness, low back pain and left hip pain.  Reports intermittent nausea, decreased urination and constipation along with dizziness that has resulted in multiple falls.  He denies any loss of consciousness.  He does note his left lower leg and ankle have been swelling as well.  Patient reports he is a current smoker at about 1.5 packs/day.     He was treated for Covid-19 infection, exacerbation of likely underlying COPD, and SUZETTE in January at [facility] and had a normal WBC at that time. His weight in January was 133lbs.     In the ER, he was hypotensive with sBP in 80's upon arrival.  Sepsis fluid bolus given along with BC drawn and zosyn initated. BP noted improvement  WBC 48.42 with HGB of 9.9, HCT 32.1. Lactic 3.3. K 3.3 with 20 meq given.  Creatinine 2.03 with GFR 31.9, alk phose 124, albumin 2.9.  CXR was non specific with vague reticulonodular airspace opacities suspicious for infectious/inflammatory process.  Was admitted for further workup and management of leukocytosis, SUZETTE, weakness and possible sepsis.     2025 CT chest abdomen pelvis without contrast  Impression:  Please note this is a motion-degraded, noncontrast exam which makes assessment somewhat suboptimal. Within these confines, there is widely metastatic malignancy throughout the chest, abdomen, and  pelvis as summarized below. Recommend tissue sampling.     Chest:  - Bulky metastatic mediastinal lymphadenopathy.  - Large metastatic soft tissue implant in the right shoulder.  - Small metastatic implant in the epicardial fat.  - Indeterminate right upper lobe pulmonary nodule.  - Bibasilar bronchial wall thickening and mucoid impaction, suggestive of an infectious/inflammatory process. Areas of interlobular septal thickening may reflect mild pulmonary edema. Background of emphysematous and chronic changes of the lungs.     Abdomen/pelvis:  - Large mass replacing the left kidney.  - Innumerable metastatic implants in the peritoneum, bilateral retroperitoneum, and mesentery.  - Numerous metastatic intramuscular and soft tissue implants.  - Small volume pelvic ascites.     6/25/2025 CT head without contrast  Impression:   Atrophy and chronic microvascular ischemic change. No acute intracranial process. Right mastoiditis.      Patient underwent duplex ultrasound of bilateral lower extremities due to his leg swelling.  This showed no DVT.  There was an irregular, heterogeneous, predominantly hypoechoic, nonvascular echolucency and subcutaneous tissue of the left groin.     Renal ultrasound showed left renal mass measuring up to 4.2 x 5.3 by 5.4 cm with resulting mild left-sided hydronephrosis.       06/25/25  Hematology/Oncology was consulted.patient seen today for initial evaluation. He appears to have somewhat limited understanding of his medical issues. He denied having any significant medical issues, however later admitted having increased urinary frequency over last few weeks and some ongoing constipation. Denied any weight loss, changes in appetite, progressive fatigue, abd pain, hematuria, diarrhea Nausea/vomiting etc. He reported being independent in his ADLs,lives by himself and is assisted by his son and daughter in some activities who live close to his home.    Subjective:  6/27/25: Patient seen today for  follow-up.  Status post biopsy of left sided soft tissue mass on 6/26/2025.  He denied any significant complaints after procedure.  Reported that he continues to feel weaker than usual but no other focal symptoms reported.  Patient reported that he had a piece of metal lodged into his chest as a child which has never been removed surgically.       He/She  has a past medical history of Coronary artery disease, Hyperlipidemia, and Hypertension.    PCP: Tomer Azevedo MD    History:  Past Medical History:   Diagnosis Date    Coronary artery disease     Hyperlipidemia     Hypertension    , History reviewed. No pertinent surgical history., History reviewed. No pertinent family history.,   Social History     Tobacco Use    Smoking status: Every Day     Current packs/day: 2.00     Average packs/day: 2.0 packs/day for 16.5 years (33.0 ttl pk-yrs)     Types: Cigarettes     Start date: 2009   Vaping Use    Vaping status: Never Used   Substance Use Topics    Alcohol use: No    Drug use: Never   ,   Medications Prior to Admission   Medication Sig Dispense Refill Last Dose/Taking    metoprolol tartrate (LOPRESSOR) 100 MG tablet METOPROLOL TARTRATE 100 MG TABS   6/23/2025 Evening    amLODIPine (NORVASC) 5 MG tablet Daily.   More than a month    citalopram (CeleXA) 40 MG tablet Take 1 tablet by mouth Daily.  12 More than a month    CloNIDine (CATAPRES) 0.2 MG tablet Daily.   More than a month    HYDROcodone-acetaminophen (NORCO)  MG per tablet Take 1 tablet by mouth 4 (Four) Times a Day.  0 More than a month    omeprazole (priLOSEC) 20 MG capsule OMEPRAZOLE 20 MG CPDR   More than a month    terazosin (HYTRIN) 2 MG capsule TERAZOSIN HCL 2 MG CAPS   More than a month   , Scheduled Meds:  [Held by provider] amLODIPine, 5 mg, Oral, Q24H  budesonide, 0.5 mg, Nebulization, BID - RT  [Held by provider] citalopram, 40 mg, Oral, Daily  [Held by provider] cloNIDine, 0.2 mg, Oral, Q24H  [START ON 6/28/2025] enoxaparin sodium, 1 mg/kg,  "Subcutaneous, Q24H  ipratropium-albuterol, 3 mL, Nebulization, TID  metoprolol tartrate, 12.5 mg, Oral, Q12H  nicotine, 1 patch, Transdermal, Daily  pantoprazole, 40 mg, Oral, Q AM  piperacillin-tazobactam, 3.375 g, Intravenous, Q8H  polyethylene glycol, 17 g, Oral, Daily  [START ON 6/28/2025] predniSONE, 20 mg, Oral, Daily With Breakfast  senna-docusate sodium, 2 tablet, Oral, BID  sodium chloride, 10 mL, Intravenous, Q12H    , Continuous Infusions:  Pharmacy to Dose enoxaparin (LOVENOX),   Pharmacy To Dose:,     , PRN Meds:    acetaminophen    senna-docusate sodium **AND** polyethylene glycol **AND** bisacodyl **AND** bisacodyl    Calcium Replacement - Follow Nurse / BPA Driven Protocol    HYDROcodone-acetaminophen    ipratropium-albuterol    Magnesium Standard Dose Replacement - Follow Nurse / BPA Driven Protocol    nitroglycerin    ondansetron ODT **OR** ondansetron    Pharmacy to Dose enoxaparin (LOVENOX)    Pharmacy To Dose:    Phosphorus Replacement - Follow Nurse / BPA Driven Protocol    Potassium Replacement - Follow Nurse / BPA Driven Protocol    [COMPLETED] Insert Peripheral IV **AND** sodium chloride    sodium chloride    sodium chloride   Allergies:  Patient has no known allergies.    Subjective     ROS:  Review of Systems   Constitutional:  Positive for activity change, appetite change and fatigue.   HENT: Negative.     Eyes: Negative.    Respiratory: Negative.     Cardiovascular: Negative.    Gastrointestinal: Negative.    Endocrine: Negative.    Genitourinary: Negative.    Musculoskeletal: Negative.    Skin: Negative.    Allergic/Immunologic: Negative.    Neurological: Negative.    Hematological: Negative.    Psychiatric/Behavioral: Negative.          Objective   Vital Signs:   /72 (BP Location: Right arm, Patient Position: Lying)   Pulse 109   Temp 97.5 °F (36.4 °C) (Oral)   Resp 13   Ht 177.8 cm (70\")   Wt 66.2 kg (145 lb 15.1 oz)   SpO2 92%   BMI 20.94 kg/m²     Physical Exam: " (performed by MD)  Physical Exam  Constitutional:       Appearance: He is normal weight. He is ill-appearing.   HENT:      Head: Normocephalic and atraumatic.      Right Ear: External ear normal.      Left Ear: External ear normal.      Nose: Nose normal.      Mouth/Throat:      Mouth: Mucous membranes are moist.      Pharynx: Oropharynx is clear.   Eyes:      Extraocular Movements: Extraocular movements intact.      Conjunctiva/sclera: Conjunctivae normal.      Pupils: Pupils are equal, round, and reactive to light.   Cardiovascular:      Rate and Rhythm: Normal rate.      Pulses: Normal pulses.   Pulmonary:      Effort: Pulmonary effort is normal.   Abdominal:      General: Abdomen is flat.      Palpations: Abdomen is soft.   Musculoskeletal:         General: Normal range of motion.      Cervical back: Normal range of motion and neck supple.   Skin:     General: Skin is warm.   Neurological:      Mental Status: He is alert.   Psychiatric:         Mood and Affect: Mood normal.         Behavior: Behavior normal.         Thought Content: Thought content normal.         Judgment: Judgment normal.         Results Review:  Lab Results (last 48 hours)       Procedure Component Value Units Date/Time    Tissue Pathology Exam [[id number]] Collected: 06/26/25 1537    Specimen: Tissue from Peritoneum Updated: 06/27/25 0749    CBC & Differential [[id number]]  (Abnormal) Collected: 06/27/25 0030    Specimen: Blood Updated: 06/27/25 0124    Narrative:      The following orders were created for panel order CBC & Differential.  Procedure                               Abnormality         Status                     ---------                               -----------         ------                     CBC Auto Differential[[id number]]        Abnormal            Final result               Scan Slide[[id number]]                                       Final result                 Please view results for these tests on the individual orders.     CBC Auto Differential [[id number]]  (Abnormal) Collected: 06/27/25 0030    Specimen: Blood Updated: 06/27/25 0124     WBC 80.91 10*3/mm3      RBC 3.07 10*6/mm3      Hemoglobin 9.0 g/dL      Hematocrit 29.6 %      MCV 96.4 fL      MCH 29.3 pg      MCHC 30.4 g/dL      RDW 18.1 %      RDW-SD 62.9 fl      MPV 9.5 fL      Platelets 317 10*3/mm3     Narrative:      The previously reported component NRBC is no longer being reported. Previous result was 0.0 /100 WBC (Reference Range: 0.0-0.2 /100 WBC) on 6/27/2025 at 0039 EDT.    Scan Slide [[id number]] Collected: 06/27/25 0030    Specimen: Blood Updated: 06/27/25 0124     Scan Slide --     Comment: See Manual Differential Results       Manual Differential [[id number]]  (Abnormal) Collected: 06/27/25 0030    Specimen: Blood Updated: 06/27/25 0124     Neutrophil % 69.0 %      Lymphocyte % 4.0 %      Bands %  26.0 %      Metamyelocyte % 1.0 %      Neutrophils Absolute 76.86 10*3/mm3      Lymphocytes Absolute 3.24 10*3/mm3      Crenated RBC's Slight/1+     Dacrocytes Slight/1+     Poikilocytes Slight/1+     Polychromasia Slight/1+     WBC Morphology Normal     Platelet Estimate Adequate    Basic Metabolic Panel [[id number]]  (Abnormal) Collected: 06/27/25 0030    Specimen: Blood Updated: 06/27/25 0056     Glucose 127 mg/dL      BUN 33.9 mg/dL      Creatinine 1.98 mg/dL      Sodium 140 mmol/L      Potassium 3.7 mmol/L      Chloride 107 mmol/L      CO2 15.3 mmol/L      Calcium 8.8 mg/dL      BUN/Creatinine Ratio 17.1     Anion Gap 17.7 mmol/L      eGFR 32.9 mL/min/1.73     Narrative:      GFR Categories in Chronic Kidney Disease (CKD)              GFR Category          GFR (mL/min/1.73)    Interpretation  G1                    90 or greater        Normal or high (1)  G2                    60-89                Mild decrease (1)  G3a                   45-59                Mild to moderate decrease  G3b                   30-44                Moderate to severe decrease  G4                     15-29                Severe decrease  G5                    14 or less           Kidney failure    (1)In the absence of evidence of kidney disease, neither GFR category G1 or G2 fulfill the criteria for CKD.    eGFR calculation 2021 CKD-EPI creatinine equation, which does not include race as a factor    Blood Culture - Blood, Arm, Left [[id number]]  (Normal) Collected: 06/24/25 1720    Specimen: Blood from Arm, Left Updated: 06/26/25 1730     Blood Culture No growth at 2 days    Blood Culture - Blood, Hand, Left [[id number]]  (Normal) Collected: 06/24/25 1720    Specimen: Blood from Hand, Left Updated: 06/26/25 1730     Blood Culture No growth at 2 days    Haptoglobin [[id number]]  (Abnormal) Collected: 06/26/25 0428    Specimen: Blood from Arm, Left Updated: 06/26/25 1033     Haptoglobin 232 mg/dL      Comment: Specimen hemolyzed. Results may be affected.       Uric Acid [[id number]]  (Abnormal) Collected: 06/26/25 0428    Specimen: Blood from Arm, Left Updated: 06/26/25 0612     Uric Acid 8.5 mg/dL     Basic Metabolic Panel [[id number]]  (Abnormal) Collected: 06/26/25 0428    Specimen: Blood from Arm, Left Updated: 06/26/25 0518     Glucose 160 mg/dL      BUN 29.3 mg/dL      Creatinine 1.95 mg/dL      Sodium 140 mmol/L      Potassium 4.1 mmol/L      Chloride 107 mmol/L      CO2 11.8 mmol/L      Calcium 8.6 mg/dL      BUN/Creatinine Ratio 15.0     Anion Gap 21.2 mmol/L      eGFR 33.5 mL/min/1.73     Narrative:      GFR Categories in Chronic Kidney Disease (CKD)              GFR Category          GFR (mL/min/1.73)    Interpretation  G1                    90 or greater        Normal or high (1)  G2                    60-89                Mild decrease (1)  G3a                   45-59                Mild to moderate decrease  G3b                   30-44                Moderate to severe decrease  G4                    15-29                Severe decrease  G5                    14 or less           Kidney  failure    (1)In the absence of evidence of kidney disease, neither GFR category G1 or G2 fulfill the criteria for CKD.    eGFR calculation 2021 CKD-EPI creatinine equation, which does not include race as a factor    Occult Blood, Fecal By Immunoassay - Stool, Per Rectum [[id number]]  (Normal) Collected: 06/26/25 0307    Specimen: Stool from Per Rectum Updated: 06/26/25 0346     Occult Blood, Fecal by Immunoassay Negative    Protime-INR [[id number]]  (Abnormal) Collected: 06/26/25 0148    Specimen: Blood Updated: 06/26/25 0228     Protime 18.0 Seconds      INR 1.49    aPTT [[id number]]  (Normal) Collected: 06/26/25 0148    Specimen: Blood Updated: 06/26/25 0228     PTT 30.1 seconds     CBC & Differential [[id number]]  (Abnormal) Collected: 06/26/25 0148    Specimen: Blood Updated: 06/26/25 0215    Narrative:      The following orders were created for panel order CBC & Differential.  Procedure                               Abnormality         Status                     ---------                               -----------         ------                     CBC Auto Differential[[id number]]        Abnormal            Final result               Scan Slide[[id number]]                                                                    Please view results for these tests on the individual orders.    CBC Auto Differential [[id number]]  (Abnormal) Collected: 06/26/25 0148    Specimen: Blood Updated: 06/26/25 0215     WBC 68.74 10*3/mm3      RBC 3.04 10*6/mm3      Hemoglobin 9.0 g/dL      Hematocrit 29.3 %      MCV 96.4 fL      MCH 29.6 pg      MCHC 30.7 g/dL      RDW 17.8 %      RDW-SD 61.6 fl      MPV 9.8 fL      Platelets 266 10*3/mm3      Neutrophil % 92.7 %      Lymphocyte % 1.8 %      Monocyte % 0.9 %      Eosinophil % 0.0 %      Basophil % 0.3 %      Immature Grans % 4.3 %      Neutrophils, Absolute 63.79 10*3/mm3      Lymphocytes, Absolute 1.21 10*3/mm3      Monocytes, Absolute 0.59 10*3/mm3      Eosinophils, Absolute 0.00  10*3/mm3      Basophils, Absolute 0.19 10*3/mm3      Immature Grans, Absolute 2.96 10*3/mm3      nRBC 0.0 /100 WBC     Reticulocytes [[id number]]  (Abnormal) Collected: 06/26/25 0148    Specimen: Blood Updated: 06/26/25 0208     Reticulocyte % 2.46 %      Reticulocyte Absolute 0.0758 10*6/mm3     Lactate Dehydrogenase [[id number]]  (Abnormal) Collected: 06/25/25 0000    Specimen: Blood from Arm, Right Updated: 06/25/25 1702      U/L      Comment: Specimen hemolyzed.  Results may be affected.       Lactic Acid, Plasma [[id number]]  (Abnormal) Collected: 06/25/25 1429    Specimen: Blood Updated: 06/25/25 1458     Lactate 4.7 mmol/L              Pending Results:     Imaging Reviewed:   US Guided Soft Tissue/Muscle Biopsy  Result Date: 6/26/2025  Successful ultrasound-guided core biopsy of left lower quadrant soft tissue mass. Report dictated by: Raul Verde DNP  I have personally reviewed this case and agree with the findings above: Electronically Signed: Angela Eason MD  6/26/2025 4:11 PM EDT  Workstation ID: [id number]    CT Chest Without Contrast Diagnostic  Result Date: 6/25/2025  Impression: Please note this is a motion-degraded, noncontrast exam which makes assessment somewhat suboptimal. Within these confines, there is widely metastatic malignancy throughout the chest, abdomen, and pelvis as summarized below. Recommend tissue sampling. Chest: - Bulky metastatic mediastinal lymphadenopathy. - Large metastatic soft tissue implant in the right shoulder. - Small metastatic implant in the epicardial fat. - Indeterminate right upper lobe pulmonary nodule. - Bibasilar bronchial wall thickening and mucoid impaction, suggestive of an infectious/inflammatory process. Areas of interlobular septal thickening may reflect mild pulmonary edema. Background of emphysematous and chronic changes of the lungs. Abdomen/pelvis: - Large mass replacing the left kidney. - Innumerable metastatic implants in the peritoneum, bilateral  retroperitoneum, and mesentery. - Numerous metastatic intramuscular and soft tissue implants. - Small volume pelvic ascites. Electronically Signed: Rodger Mckeon MD  6/25/2025 8:29 AM EDT  Workstation ID: [id number]    CT Abdomen Pelvis Without Contrast  Result Date: 6/25/2025  Impression: Please note this is a motion-degraded, noncontrast exam which makes assessment somewhat suboptimal. Within these confines, there is widely metastatic malignancy throughout the chest, abdomen, and pelvis as summarized below. Recommend tissue sampling. Chest: - Bulky metastatic mediastinal lymphadenopathy. - Large metastatic soft tissue implant in the right shoulder. - Small metastatic implant in the epicardial fat. - Indeterminate right upper lobe pulmonary nodule. - Bibasilar bronchial wall thickening and mucoid impaction, suggestive of an infectious/inflammatory process. Areas of interlobular septal thickening may reflect mild pulmonary edema. Background of emphysematous and chronic changes of the lungs. Abdomen/pelvis: - Large mass replacing the left kidney. - Innumerable metastatic implants in the peritoneum, bilateral retroperitoneum, and mesentery. - Numerous metastatic intramuscular and soft tissue implants. - Small volume pelvic ascites. Electronically Signed: Rodger Mckeon MD  6/25/2025 8:29 AM EDT  Workstation ID: [id number]    CT Head Without Contrast  Result Date: 6/25/2025  Impression: Atrophy and chronic microvascular ischemic change. No acute intracranial process. Right mastoiditis. Electronically Signed: Mike García MD  6/25/2025 5:54 AM EDT  Workstation ID: [id number]    US Renal Bilateral  Result Date: 6/25/2025  Impression: Partially obstructing left renal collecting system from a left renal mass. Please correlate with CT abdomen/pelvis. Electronically Signed: Mike García MD  6/25/2025 3:43 AM EDT  Workstation ID: [id number]    XR Chest 1 View  Result Date: 6/24/2025  Impression: Vague reticulonodular airspace  opacities suspicious for infectious/inflammatory process. Etiologies may include bronchiolitis, aspiration or developing bronchopneumonia. 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  -Reviewed these findings with patient, explained to him that the overall picture is concerning for metastati RCC. Also discussed the importance of tissue diagnosis to consider treatment options.  - Status post left lower quadrant mass biopsy on 6/26/2025.  Pathology is pending.  - Would consider CNS imaging if confirmed to have renal malignancy.  MRI is likely contraindicated due to presence of metallic implant in chest.       Leukocytosis with neutrophilia:  -noted WBC count 48.5k on presentation with Left shift.  Noted to have uptrending WBC count to 80.9 with significant left shift.  Steroid use is likely contributing.  This is  Likely reactive to malignancy.  Ordered peripheral blood flow cytometry, this is pending.  Low concern for primary hematologic neoplasm   -Patient is being managed for suspected sepsis with IV antibiotics and fluids     Normocytic anemia  Folate Deficiency:  - Iron studies not consistent with iron deficiency.  Will assess for further nutritional deficiencies and hemolysis.  Continue to monitor blood counts.  - Folate deficiency noted. Recommend daily folic acid supplement.    Electronically signed by Farnk Jones MD, 06/27/25, 2:29 PM EDT.      Thank you for this consult. We will be happy to follow along with you.

## 2025-06-27 NOTE — THERAPY TREATMENT NOTE
"Subjective: Pt agreeable to therapeutic plan of care.  Cognition: arousal/alertness: Alert and Attentive, oriented to name/, place, and current year    Objective:     Precautions - High falls risk, confusion    Bed Mobility: SBA   Functional Transfers: CGA and with rolling walker  Balance: with UE support and standing CGA and with rolling walker  Functional Ambulation: CGA and with rolling walker    Toileting: Max-A  ADL Position: unsupported sitting EOB  ADL Comments: urinal     Therapeutic Exercise - 10 Reps B UE AROM unsupported sitting / EOB    Vitals: WNL    Pain: 0  Location: N/A  Interventions for pain: N/A  Education: Provided education on the importance of mobility in the acute care setting, Verbal/Tactile Cues, and ADL training    Assessment: Jeremias Negro presents with ADL impairments affecting function including balance, cognition, and strength. Pt alert and attentive. Requires max assist to utilize urinal for toileting unsupported EOB. He ambulates with CGA and FWW. He is highly impulsive with decreased safety awareness requiring max verbal cueing throughout treatment session. Demonstrated functioning below baseline abilities indicate the need for continued skilled intervention while inpatient. Tolerating session today without incident. Will continue to follow and progress as tolerated.     Plan/Recommendations:   Moderate Intensity Therapy recommended post-acute care. This is recommended as therapy feels the patient would require 3-4 days per week and wouldn't tolerate \"3 hour daily\" rehab intensity. SNF would be the preferred choice. If the patient does not agree to SNF, arrange HH or OP depending on home bound status. If patient is medically complex, consider LTACH.    Pt desires Skilled Rehab placement at discharge. Pt cooperative; agreeable to therapeutic recommendations and plan of care.     Modified [place]: N/A = No pre-op stroke/TIA    Post-Tx Position: Up in Chair, Alarms activated, and " Call light and personal items within reach  PPE: gloves    Therapy Charges for Today       Code Description Service Date Service Provider Modifiers Qty    03576187849 HC OT THER PROC EA 15 MIN 6/27/2025 Ry Johnson OT GO 1    76277408234 HC OT SELF CARE/MGMT/TRAIN EA 15 MIN 6/27/2025 Ry Johnson OT GO 1           Time Calculation- OT       Row Name 06/27/25 1443             Time Calculation- OT    OT Start Time 1344  -SP      OT Stop Time 1400  -SP      OT Time Calculation (min) 16 min  -SP      Total Timed Code Minutes- OT 16 minute(s)  -SP      OT Received On 06/27/25  -SP      OT - Next Appointment 06/30/25  -SP                User Key  (r) = Recorded By, (t) = Taken By, (c) = Cosigned By      Initials Name Provider Type    SP Ry Johnson, OT Occupational Therapist

## 2025-06-27 NOTE — PLAN OF CARE
Goal Outcome Evaluation:      Pt has barely rested through the night. Pt has removed pulse ox multiple times, reapplied and redirected patient. Pt was able to transfer to [facility] with assist of 2 people d/t heart monitor IV abx. Pt successfully had bm this shift. Call light remains within reach, round frequency increased. Pt has been between room air and 2L, pt has been redirected to leave nasal cannula in place

## 2025-06-27 NOTE — PLAN OF CARE
Goal Outcome Evaluation:            Pt resting in room, A/O x 4 today with periodic confusion. Pt receiving IV abx. Complaining of nausea, zofran given.

## 2025-06-27 NOTE — THERAPY TREATMENT NOTE
"Subjective: Pt agreeable to therapeutic plan of care.    Objective:     Precautions - falls    Bed mobility - N/A or Not attempted.  Transfers - CGA, Min-A, Assist x 2, and with rolling walker  Ambulation - 100 feet CGA, Min-A, and with rolling walker    Vitals: WNL    Pain: 0 [place] Location: n/a  Intervention for pain: Repositioned, RN notified, Increased Activity, and Therapeutic Presence    Education: Provided education on the importance of mobility in the acute care setting, Verbal/Tactile Cues, Transfer Training, Gait Training, and Energy conservation strategies    Assessment: Jeremias Negro presents with functional mobility impairments which indicate the need for skilled intervention. Tolerating session today without incident. Will continue to follow and progress as tolerated.     Plan/Recommendations:   If medically appropriate, Moderate Intensity Therapy recommended post-acute care. This is recommended as therapy feels the patient would require 3-4 days per week and wouldn't tolerate \"3 hour daily\" rehab intensity. SNF would be the preferred choice. If the patient does not agree to SNF, arrange HH or OP depending on home bound status. If patient is medically complex, consider LTACH. Pt requires no DME at discharge.     Pt desires Skilled Rehab placement at discharge. Pt cooperative; agreeable to therapeutic recommendations and plan of care.         Basic Mobility 6-click:  Rollin = Total, A lot = 2, A little = 3; 4 = None  Supine>Sit:   1 = Total, A lot = 2, A little = 3; 4 = None   Sit>Stand with arms:  1 = Total, A lot = 2, A little = 3; 4 = None  Bed>Chair:   1 = Total, A lot = 2, A little = 3; 4 = None  Ambulate in room:  1 = Total, A lot = 2, A little = 3; 4 = None  3-5 Steps with railin = Total, A lot = 2, A little = 3; 4 = None  Score: 19    Modified [place]: N/A = No pre-op stroke/TIA    Post-Tx Position: Up in Chair, Staff Present, Alarms activated, and Call light and personal " Problem: Alcohol Withdrawal  Goal: *STG: Participates in treatment plan  Description: Patient will participate in treatment plan daily during hospitalization  Outcome: Progressing Towards Goal  Goal: *STG: Verbalizes abstinence as an achievable goal  Description: Patient will verbalize abstinence as an achievable goal prior to discharge. Outcome: Progressing Towards Goal  Goal: *STG: Agrees to participate in outpatient after care program to support ongoing mental health  Description: Patient will agree to follow up with outpatient treatment program prior to discharge. Outcome: Progressing Towards Goal     Pt denies SI/HI. Pt also denies experiencing hallucinations of all types. Pt presents with flat affect. Pt is being treated per CIWA protocol. Pt received PRN Ativan 1 mg for CIWA score of 5. Pt is interactive with peers and is adherent with unit guidelines. Pt is medication compliant. Will continue to monitor. items within reach  PPE: gloves    Therapy Charges for Today       Code Description Service Date Service Provider Modifiers Qty    12712402666 HC GAIT TRAINING EA 15 MIN 6/27/2025 Bertha Cazares, PT GP 1    06329934252 HC PT THERAPEUTIC ACT EA 15 MIN 6/27/2025 Bertha Cazares, PT GP 1           PT Charges       Row Name 06/27/25 1543             Time Calculation    Start Time 1344  -CM      Stop Time 1400  -CM      Time Calculation (min) 16 min  -CM      PT Received On 06/27/25  -CM      PT - Next Appointment 06/30/25  -CM         Time Calculation- PT    Total Timed Code Minutes- PT 16 minute(s)  -CM                User Key  (r) = Recorded By, (t) = Taken By, (c) = Cosigned By      Initials Name Provider Type    Bertha Chavarria, PT Physical Therapist

## 2025-06-27 NOTE — PROGRESS NOTES
CARDIOLOGY PROGRESS NOTE:    Jeremias Negro  84 y.o.  male  1940  [id number]      Referring Provider: Dr. Collazo    Reason for follow-up: New onset atrial fibrillation, prolonged QTc     Patient Care Team:  Tomer Azevedo MD as PCP - General (Family Medicine)    Subjective .  Patient seen and examined.  Labs reviewed.  Chart reviewed.  Patient reports he is doing well from cardiology standpoint as he denies chest pain, shortness of breath, palpitations, edema.      Objective  Patient lying in bed resting comfortably on 2 LNC     Review of Systems   Constitutional: Positive for malaise/fatigue. Negative for chills and fever.   Cardiovascular:  Negative for chest pain, dyspnea on exertion, leg swelling, palpitations and syncope.   Respiratory:  Positive for cough. Negative for shortness of breath.    Gastrointestinal:  Negative for abdominal pain, nausea and vomiting.   Neurological:  Negative for dizziness, light-headedness and weakness.   Psychiatric/Behavioral:  Negative for altered mental status.        Allergies: Patient has no known allergies.    Scheduled Meds:[Held by provider] amLODIPine, 5 mg, Oral, Q24H  budesonide, 0.5 mg, Nebulization, BID - RT  [Held by provider] citalopram, 40 mg, Oral, Daily  [Held by provider] cloNIDine, 0.2 mg, Oral, Q24H  [Held by provider] enoxaparin sodium, 1 mg/kg, Subcutaneous, Q24H  ipratropium-albuterol, 3 mL, Nebulization, TID  methylPREDNISolone sodium succinate, 60 mg, Intravenous, Q12H  metoprolol tartrate, 12.5 mg, Oral, Q12H  nicotine, 1 patch, Transdermal, Daily  pantoprazole, 40 mg, Oral, Q AM  piperacillin-tazobactam, 3.375 g, Intravenous, Q8H  polyethylene glycol, 17 g, Oral, Daily  senna-docusate sodium, 2 tablet, Oral, BID  sodium chloride, 10 mL, Intravenous, Q12H      Continuous Infusions:Pharmacy to Dose enoxaparin (LOVENOX),   Pharmacy To Dose:,       PRN Meds:.  acetaminophen    senna-docusate sodium **AND** polyethylene glycol **AND** bisacodyl **AND**  "bisacodyl    Calcium Replacement - Follow Nurse / BPA Driven Protocol    HYDROcodone-acetaminophen    ipratropium-albuterol    Magnesium Standard Dose Replacement - Follow Nurse / BPA Driven Protocol    nitroglycerin    ondansetron ODT **OR** ondansetron    Pharmacy to Dose enoxaparin (LOVENOX)    Pharmacy To Dose:    Phosphorus Replacement - Follow Nurse / BPA Driven Protocol    Potassium Replacement - Follow Nurse / BPA Driven Protocol    [COMPLETED] Insert Peripheral IV **AND** sodium chloride    sodium chloride    sodium chloride        VITAL SIGNS  Vitals:    06/27/25 0752 06/27/25 0753 06/27/25 0756 06/27/25 0800   BP:    116/75   BP Location:    Left arm   Patient Position:    Lying   Pulse: 112 111 111 109   Resp: 28 28 17 18   Temp:    97.6 °F (36.4 °C)   TempSrc:    Oral   SpO2: 99% 100% 100%    Weight:       Height:           Flowsheet Rows      Flowsheet Row First Filed Value   Admission Height 177.8 cm (70\") Documented at 06/24/2025 1604   Admission Weight 59 kg (130 lb) Documented at 06/24/2025 1604             TELEMETRY: Sinus tachycardia with PACs    Physical Exam:  Vitals reviewed.   Constitutional:       Appearance: Not in distress.   Eyes:      Pupils: Pupils are equal, round, and reactive to light.   HENT:    Mouth/Throat:      Pharynx: Oropharynx is clear.   Pulmonary:      Effort: Pulmonary effort is normal.      Breath sounds: Rhonchi present.   Cardiovascular:      Tachycardia present. Occasional ectopic beats. Regular rhythm.      Murmurs: There is a systolic murmur.   Pulses:     Intact distal pulses.   Abdominal:      Palpations: Abdomen is soft.   Musculoskeletal: Normal range of motion.      Cervical back: Neck supple. Skin:     General: Skin is warm and dry.   Neurological:      General: No focal deficit present.      Mental Status: Alert and oriented to person, place and time.          Results Review:   I reviewed the patient's new clinical results.  Lab Results (last 24 hours)       " Procedure Component Value Units Date/Time    Tissue Pathology Exam [[id number]] Collected: 06/26/25 1537    Specimen: Tissue from Peritoneum Updated: 06/27/25 0749    CBC & Differential [[id number]]  (Abnormal) Collected: 06/27/25 0030    Specimen: Blood Updated: 06/27/25 0124    Narrative:      The following orders were created for panel order CBC & Differential.  Procedure                               Abnormality         Status                     ---------                               -----------         ------                     CBC Auto Differential[[id number]]        Abnormal            Final result               Scan Slide[[id number]]                                       Final result                 Please view results for these tests on the individual orders.    CBC Auto Differential [[id number]]  (Abnormal) Collected: 06/27/25 0030    Specimen: Blood Updated: 06/27/25 0124     WBC 80.91 10*3/mm3      RBC 3.07 10*6/mm3      Hemoglobin 9.0 g/dL      Hematocrit 29.6 %      MCV 96.4 fL      MCH 29.3 pg      MCHC 30.4 g/dL      RDW 18.1 %      RDW-SD 62.9 fl      MPV 9.5 fL      Platelets 317 10*3/mm3     Narrative:      The previously reported component NRBC is no longer being reported. Previous result was 0.0 /100 WBC (Reference Range: 0.0-0.2 /100 WBC) on 6/27/2025 at 0039 EDT.    Scan Slide [[id number]] Collected: 06/27/25 0030    Specimen: Blood Updated: 06/27/25 0124     Scan Slide --     Comment: See Manual Differential Results       Manual Differential [[id number]]  (Abnormal) Collected: 06/27/25 0030    Specimen: Blood Updated: 06/27/25 0124     Neutrophil % 69.0 %      Lymphocyte % 4.0 %      Bands %  26.0 %      Metamyelocyte % 1.0 %      Neutrophils Absolute 76.86 10*3/mm3      Lymphocytes Absolute 3.24 10*3/mm3      Crenated RBC's Slight/1+     Dacrocytes Slight/1+     Poikilocytes Slight/1+     Polychromasia Slight/1+     WBC Morphology Normal     Platelet Estimate Adequate    Basic Metabolic Panel  [[id number]]  (Abnormal) Collected: 06/27/25 0030    Specimen: Blood Updated: 06/27/25 0056     Glucose 127 mg/dL      BUN 33.9 mg/dL      Creatinine 1.98 mg/dL      Sodium 140 mmol/L      Potassium 3.7 mmol/L      Chloride 107 mmol/L      CO2 15.3 mmol/L      Calcium 8.8 mg/dL      BUN/Creatinine Ratio 17.1     Anion Gap 17.7 mmol/L      eGFR 32.9 mL/min/1.73     Narrative:      GFR Categories in Chronic Kidney Disease (CKD)              GFR Category          GFR (mL/min/1.73)    Interpretation  G1                    90 or greater        Normal or high (1)  G2                    60-89                Mild decrease (1)  G3a                   45-59                Mild to moderate decrease  G3b                   30-44                Moderate to severe decrease  G4                    15-29                Severe decrease  G5                    14 or less           Kidney failure    (1)In the absence of evidence of kidney disease, neither GFR category G1 or G2 fulfill the criteria for CKD.    eGFR calculation 2021 CKD-EPI creatinine equation, which does not include race as a factor    Blood Culture - Blood, Arm, Left [[id number]]  (Normal) Collected: 06/24/25 1720    Specimen: Blood from Arm, Left Updated: 06/26/25 1730     Blood Culture No growth at 2 days    Blood Culture - Blood, Hand, Left [[id number]]  (Normal) Collected: 06/24/25 1720    Specimen: Blood from Hand, Left Updated: 06/26/25 1730     Blood Culture No growth at 2 days    Haptoglobin [[id number]]  (Abnormal) Collected: 06/26/25 0428    Specimen: Blood from Arm, Left Updated: 06/26/25 1033     Haptoglobin 232 mg/dL      Comment: Specimen hemolyzed. Results may be affected.               Imaging Results (Last 24 Hours)       Procedure Component Value Units Date/Time    US Guided Soft Tissue/Muscle Biopsy [[id number]] Collected: 06/26/25 1605     Updated: 06/26/25 1613    Narrative:      DATE OF EXAM:  6/26/2025 3:10 PM EDT    PROCEDURE:  US GUIDED SOFT  TISSUE/MUSCLE BIOPSY    INDICATIONS:  Soft tissue biopsy from peritoneal metastases; suspected metastatic malignancy    COMPARISON:  CT abdomen pelvis 6/25/2025    FLUOROSCOPIC TIME:  None    PHYSICIAN MONITORED CONSCIOUS SEDATION TIME:  Fentanyl only    TECHNIQUE:   A detailed explanation of the procedure, including the risks, benefits, and alternatives was provided. Informed written consent was obtained from the patient. The interventional radiology nurse monitored the patient and all times and provided fentanyl   for analgesia. A preprocedure timeout was performed. The patient was placed supine on the bed. The left lower quadrant soft tissue mass was marked, prepped and draped using maximal sterile barrier technique. The skin and subcutaneous tissue was   anesthetized with 1% lidocaine. Next, utilizing ultrasound guidance, a 17-gauge coaxial needle was advanced to the mass. From this position, a total of 4 18-gauge core biopsies were obtained and given to the pathologist at bedside who noted the presence   of diagnostic tissue. Following the final pass, the needle was removed and hemostasis was achieved. A sterile bandage was applied. The patient tolerated the procedure well without immediate complication. The patient was transported back to the inpatient   unit in stable condition for recovery.    FINDINGS:  See above      Impression:      Successful ultrasound-guided core biopsy of left lower quadrant soft tissue mass.      Report dictated by: Raul Verde DNP      I have personally reviewed this case and agree with the findings above:    Electronically Signed: Angela Eason MD    6/26/2025 4:11 PM EDT    Workstation ID: [id number]            EKG      I personally viewed and interpreted the patient's EKG/Telemetry data:    ECHOCARDIOGRAM:  Results for orders placed during the hospital encounter of 06/24/25    Adult Transthoracic Echo Complete W/ Cont if Necessary Per Protocol    Interpretation Summary    Left  ventricular ejection fraction appears to be 51 - 55%.    The right ventricular cavity is mildly dilated.    Severe aortic valve stenosis is present.    Estimated right ventricular systolic pressure from tricuspid regurgitation is normal (<35 mmHg).       STRESS MYOVIEW:       CARDIAC CATHETERIZATION:  No results found for this or any previous visit.       OTHER:         Assessment & Plan     Principal Problem:    SUZETTE (acute kidney injury)  Active Problems:    Hypertension    Hyperlipidemia    Coronary artery disease    Hypokalemia    Leukocytosis    Elevated lactic acid level    Anemia    Sepsis, unspecified organism    Weakness    Constipation    Falls frequently    Dizziness    Hard of hearing    Body mass index (BMI) of 19.0 to 19.9 in adult    Cigarette smoker    CKD (chronic kidney disease) stage 3, GFR 30-59 ml/min    Atrial fibrillation    Left hip pain    Severe protein-calorie malnutrition       Atrial fibrilation  New onset  Intermittent RVR  Beta-blocker for rate control, increase as pressure allows   Lovenox for anticoagulation  Recommend oral anticoagulation prior to discharge  Discussed with patient risk of stroke versus bleeding  [id number] score 4   Echocardiogram with LVEF 51 to 55%  TSH 1.49    Severe aortic stenosis  Echocardiogram with LVEF 51 to 55%, mildly dilated RV  Severe aortic valve stenosis noted   Beta-blocker  Further workup for AS pending prognosis    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  Leukocytosis  WBC 48.57--68.47--80.91  CT chest and a/p concerning for metastatic disease  Hematology/oncology following   Status post biopsy 6/26    Lactic acidosis  Sepsis  3.3--4.9--4.0--4.7  Procal 0.30  Status post fluid bolus  IV antibiotics     History of coronary artery disease  Status post CABG  Denies chest pain  Beta-blocker   A1c 5.36  LDL 52     History of hypertension  Hypotensive at admission  Improved status post IVF  Low-dose beta-blocker     SUZETTE on CKD  Creatinine  1.98  Consider nephrology consult if worsens     COPD  Tobacco abuse  Pulmicort  DuoNeb  IV steroids  Nicotine patch  Counseled complete cessation        Isabel Davila, RAAD  06/27/25  09:21 EDT

## 2025-06-27 NOTE — CASE MANAGEMENT/SOCIAL WORK
Continued Stay Note  [facility]     Patient Name: Jeremias Negro  MRN: [id number]  Today's Date: 6/27/2025    Admit Date: 6/24/2025    Plan: [facility] pending bed availability. Will require precert. PASRR approved. From home alone.   Discharge Plan       Row Name 06/27/25 1116       Plan    Plan [facility] accepted pending bed availability. Will require precert. PASRR approved. From home alone.    Plan Comments CM confirmed with [facility] liaison that they have accepted pending bed availability. Barrier to D/C: IV abx, IV steroids, WBC monitoring (WBC 80.91), biopsy results pending.                     Expected Discharge Date and Time       Expected Discharge Date Expected Discharge Time    Jun 30, 2025           Phone communication or documentation only - no physical contact with patient or family.     ARABELLA WhyteN, RN, [facility]    [address] [address]    Office: [phone]  Fax: [phone]

## 2025-06-27 NOTE — PLAN OF CARE
"Goal Outcome Evaluation:  Plan of Care Reviewed With: patient      Assessment: Jeremias Negro presents with functional mobility impairments which indicate the need for skilled intervention. Tolerating session today without incident. Will continue to follow and progress as tolerated.     Plan/Recommendations:   If medically appropriate, Moderate Intensity Therapy recommended post-acute care. This is recommended as therapy feels the patient would require 3-4 days per week and wouldn't tolerate \"3 hour daily\" rehab intensity. SNF would be the preferred choice. If the patient does not agree to SNF, arrange HH or OP depending on home bound status. If patient is medically complex, consider LTACH. Pt requires no DME at discharge.     Pt desires Skilled Rehab placement at discharge. Pt cooperative; agreeable to therapeutic recommendations and plan of care.   Anticipated Discharge Disposition (PT): skilled nursing facility                        "

## 2025-06-28 LAB
ANION GAP SERPL CALCULATED.3IONS-SCNC: 15.1 MMOL/L (ref 5–15)
BASOPHILS # BLD AUTO: 0.19 10*3/MM3 (ref 0–0.2)
BASOPHILS NFR BLD AUTO: 0.3 % (ref 0–1.5)
BUN SERPL-MCNC: 36.4 MG/DL (ref 8–23)
BUN/CREAT SERPL: 17.2 (ref 7–25)
CALCIUM SPEC-SCNC: 9.1 MG/DL (ref 8.6–10.5)
CHLORIDE SERPL-SCNC: 110 MMOL/L (ref 98–107)
CO2 SERPL-SCNC: 16.9 MMOL/L (ref 22–29)
CREAT SERPL-MCNC: 2.12 MG/DL (ref 0.76–1.27)
DEPRECATED RDW RBC AUTO: 65 FL (ref 37–54)
EGFRCR SERPLBLD CKD-EPI 2021: 30.3 ML/MIN/1.73
EOSINOPHIL # BLD AUTO: 0.33 10*3/MM3 (ref 0–0.4)
EOSINOPHIL NFR BLD AUTO: 0.5 % (ref 0.3–6.2)
ERYTHROCYTE [DISTWIDTH] IN BLOOD BY AUTOMATED COUNT: 18.4 % (ref 12.3–15.4)
GLUCOSE SERPL-MCNC: 87 MG/DL (ref 65–99)
HCT VFR BLD AUTO: 30.8 % (ref 37.5–51)
HGB BLD-MCNC: 9.2 G/DL (ref 13–17.7)
IMM GRANULOCYTES # BLD AUTO: 2.51 10*3/MM3 (ref 0–0.05)
IMM GRANULOCYTES NFR BLD AUTO: 3.8 % (ref 0–0.5)
LYMPHOCYTES # BLD AUTO: 2.86 10*3/MM3 (ref 0.7–3.1)
LYMPHOCYTES NFR BLD AUTO: 4.4 % (ref 19.6–45.3)
MCH RBC QN AUTO: 29.2 PG (ref 26.6–33)
MCHC RBC AUTO-ENTMCNC: 29.9 G/DL (ref 31.5–35.7)
MCV RBC AUTO: 97.8 FL (ref 79–97)
MONOCYTES # BLD AUTO: 1.94 10*3/MM3 (ref 0.1–0.9)
MONOCYTES NFR BLD AUTO: 3 % (ref 5–12)
NEUTROPHILS NFR BLD AUTO: 57.75 10*3/MM3 (ref 1.7–7)
NEUTROPHILS NFR BLD AUTO: 88 % (ref 42.7–76)
NRBC BLD AUTO-RTO: 0 /100 WBC (ref 0–0.2)
PLATELET # BLD AUTO: 304 10*3/MM3 (ref 140–450)
PMV BLD AUTO: 9.6 FL (ref 6–12)
POTASSIUM SERPL-SCNC: 3.7 MMOL/L (ref 3.5–5.2)
RBC # BLD AUTO: 3.15 10*6/MM3 (ref 4.14–5.8)
SODIUM SERPL-SCNC: 142 MMOL/L (ref 136–145)
WBC NRBC COR # BLD AUTO: 65.58 10*3/MM3 (ref 3.4–10.8)

## 2025-06-28 PROCEDURE — 85025 COMPLETE CBC W/AUTO DIFF WBC: CPT

## 2025-06-28 PROCEDURE — 99232 SBSQ HOSP IP/OBS MODERATE 35: CPT | Performed by: INTERNAL MEDICINE

## 2025-06-28 PROCEDURE — 88184 FLOWCYTOMETRY/ TC 1 MARKER: CPT

## 2025-06-28 PROCEDURE — 25010000002 PIPERACILLIN SOD-TAZOBACTAM PER 1 G

## 2025-06-28 PROCEDURE — 88185 FLOWCYTOMETRY/TC ADD-ON: CPT | Performed by: STUDENT IN AN ORGANIZED HEALTH CARE EDUCATION/TRAINING PROGRAM

## 2025-06-28 PROCEDURE — 94761 N-INVAS EAR/PLS OXIMETRY MLT: CPT

## 2025-06-28 PROCEDURE — 94799 UNLISTED PULMONARY SVC/PX: CPT

## 2025-06-28 PROCEDURE — 63710000001 PREDNISONE PER 1 MG: Performed by: NURSE PRACTITIONER

## 2025-06-28 PROCEDURE — 25010000002 ENOXAPARIN PER 10 MG

## 2025-06-28 PROCEDURE — 25010000002 ONDANSETRON PER 1 MG: Performed by: PHYSICIAN ASSISTANT

## 2025-06-28 PROCEDURE — 94664 DEMO&/EVAL PT USE INHALER: CPT

## 2025-06-28 PROCEDURE — 80048 BASIC METABOLIC PNL TOTAL CA: CPT

## 2025-06-28 RX ORDER — TERAZOSIN 2 MG/1
2 CAPSULE ORAL NIGHTLY
Status: DISCONTINUED | OUTPATIENT
Start: 2025-06-28 | End: 2025-07-01 | Stop reason: HOSPADM

## 2025-06-28 RX ADMIN — IPRATROPIUM BROMIDE AND ALBUTEROL SULFATE 3 ML: .5; 3 SOLUTION RESPIRATORY (INHALATION) at 20:15

## 2025-06-28 RX ADMIN — NICOTINE 1 PATCH: 21 PATCH, EXTENDED RELEASE TRANSDERMAL at 09:01

## 2025-06-28 RX ADMIN — PIPERACILLIN AND TAZOBACTAM 3.38 G: 3; .375 INJECTION, POWDER, FOR SOLUTION INTRAVENOUS at 01:54

## 2025-06-28 RX ADMIN — BUDESONIDE 0.5 MG: 0.5 SUSPENSION RESPIRATORY (INHALATION) at 07:47

## 2025-06-28 RX ADMIN — TERAZOSIN HYDROCHLORIDE 2 MG: 2 CAPSULE ORAL at 20:32

## 2025-06-28 RX ADMIN — BUDESONIDE 0.5 MG: 0.5 SUSPENSION RESPIRATORY (INHALATION) at 20:20

## 2025-06-28 RX ADMIN — ENOXAPARIN SODIUM 70 MG: 100 INJECTION SUBCUTANEOUS at 08:59

## 2025-06-28 RX ADMIN — PIPERACILLIN AND TAZOBACTAM 3.38 G: 3; .375 INJECTION, POWDER, FOR SOLUTION INTRAVENOUS at 18:07

## 2025-06-28 RX ADMIN — ONDANSETRON 4 MG: 2 INJECTION, SOLUTION INTRAMUSCULAR; INTRAVENOUS at 21:17

## 2025-06-28 RX ADMIN — PREDNISONE 20 MG: 20 TABLET ORAL at 08:59

## 2025-06-28 RX ADMIN — IPRATROPIUM BROMIDE AND ALBUTEROL SULFATE 3 ML: .5; 3 SOLUTION RESPIRATORY (INHALATION) at 07:42

## 2025-06-28 RX ADMIN — Medication 12.5 MG: at 08:59

## 2025-06-28 RX ADMIN — IPRATROPIUM BROMIDE AND ALBUTEROL SULFATE 3 ML: .5; 3 SOLUTION RESPIRATORY (INHALATION) at 15:26

## 2025-06-28 RX ADMIN — PIPERACILLIN AND TAZOBACTAM 3.38 G: 3; .375 INJECTION, POWDER, FOR SOLUTION INTRAVENOUS at 10:46

## 2025-06-28 RX ADMIN — Medication 12.5 MG: at 20:32

## 2025-06-28 RX ADMIN — Medication 10 ML: at 09:00

## 2025-06-28 RX ADMIN — PANTOPRAZOLE SODIUM 40 MG: 40 TABLET, DELAYED RELEASE ORAL at 08:59

## 2025-06-28 RX ADMIN — TERAZOSIN HYDROCHLORIDE 2 MG: 2 CAPSULE ORAL at 02:38

## 2025-06-28 NOTE — PROGRESS NOTES
LOS: 4 days   Patient Care Team:  Tomer Azevedo MD as PCP - General (Family Medicine)    Subjective     Patient denies any complaints overnight    Review of Systems   Constitutional:  Positive for activity change.   HENT: Negative.     Respiratory: Negative.     Cardiovascular: Negative.    Gastrointestinal: Negative.    Genitourinary: Negative.    Musculoskeletal: Negative.    Neurological:  Positive for weakness.   Psychiatric/Behavioral: Negative.             Objective     Vital Signs  Temp:  [97.3 °F (36.3 °C)-98.6 °F (37 °C)] 97.4 °F (36.3 °C)  Heart Rate:  [104-115] 108  Resp:  [13-27] 16  BP: ()/(59-78) 121/67      Physical Exam  Vitals reviewed.   Constitutional:       Appearance: He is not ill-appearing.   HENT:      Head: Normocephalic and atraumatic.      Right Ear: External ear normal.      Left Ear: External ear normal.      Nose: Nose normal.      Mouth/Throat:      Mouth: Mucous membranes are dry.   Eyes:      General:         Right eye: No discharge.         Left eye: No discharge.   Cardiovascular:      Rate and Rhythm: Rhythm irregular.      Pulses: Normal pulses.      Heart sounds: Normal heart sounds.   Pulmonary:      Breath sounds: Normal breath sounds.   Abdominal:      General: Bowel sounds are normal.      Palpations: Abdomen is soft.   Musculoskeletal:         General: Normal range of motion.      Cervical back: Normal range of motion.   Skin:     General: Skin is warm.   Neurological:      Mental Status: He is alert and oriented to person, place, and time.   Psychiatric:         Behavior: Behavior normal.              Results Review:    Lab Results (last 24 hours)       Procedure Component Value Units Date/Time    Basic Metabolic Panel [[id number]]  (Abnormal) Collected: 06/28/25 0438    Specimen: Blood Updated: 06/28/25 0527     Glucose 87 mg/dL      BUN 36.4 mg/dL      Creatinine 2.12 mg/dL      Sodium 142 mmol/L      Potassium 3.7 mmol/L      Chloride 110 mmol/L      CO2 16.9  mmol/L      Calcium 9.1 mg/dL      BUN/Creatinine Ratio 17.2     Anion Gap 15.1 mmol/L      eGFR 30.3 mL/min/1.73     Narrative:      GFR Categories in Chronic Kidney Disease (CKD)              GFR Category          GFR (mL/min/1.73)    Interpretation  G1                    90 or greater        Normal or high (1)  G2                    60-89                Mild decrease (1)  G3a                   45-59                Mild to moderate decrease  G3b                   30-44                Moderate to severe decrease  G4                    15-29                Severe decrease  G5                    14 or less           Kidney failure    (1)In the absence of evidence of kidney disease, neither GFR category G1 or G2 fulfill the criteria for CKD.    eGFR calculation 2021 CKD-EPI creatinine equation, which does not include race as a factor    CBC & Differential [[id number]]  (Abnormal) Collected: 06/28/25 0439    Specimen: Blood Updated: 06/28/25 0503    Narrative:      The following orders were created for panel order CBC & Differential.  Procedure                               Abnormality         Status                     ---------                               -----------         ------                     CBC Auto Differential[[id number]]        Abnormal            Final result               Scan Slide[[id number]]                                                                    Please view results for these tests on the individual orders.    CBC Auto Differential [[id number]]  (Abnormal) Collected: 06/28/25 0439    Specimen: Blood Updated: 06/28/25 0503     WBC 65.58 10*3/mm3      RBC 3.15 10*6/mm3      Hemoglobin 9.2 g/dL      Hematocrit 30.8 %      MCV 97.8 fL      MCH 29.2 pg      MCHC 29.9 g/dL      RDW 18.4 %      RDW-SD 65.0 fl      MPV 9.6 fL      Platelets 304 10*3/mm3      Neutrophil % 88.0 %      Lymphocyte % 4.4 %      Monocyte % 3.0 %      Eosinophil % 0.5 %      Basophil % 0.3 %      Immature Grans % 3.8 %       Neutrophils, Absolute 57.75 10*3/mm3      Lymphocytes, Absolute 2.86 10*3/mm3      Monocytes, Absolute 1.94 10*3/mm3      Eosinophils, Absolute 0.33 10*3/mm3      Basophils, Absolute 0.19 10*3/mm3      Immature Grans, Absolute 2.51 10*3/mm3      nRBC 0.0 /100 WBC     Blood Culture - Blood, Arm, Left [[id number]]  (Normal) Collected: 06/24/25 1720    Specimen: Blood from Arm, Left Updated: 06/27/25 1732     Blood Culture No growth at 3 days    Blood Culture - Blood, Hand, Left [[id number]]  (Normal) Collected: 06/24/25 1720    Specimen: Blood from Hand, Left Updated: 06/27/25 1732     Blood Culture No growth at 3 days             Imaging Results (Last 24 Hours)       ** No results found for the last 24 hours. **                 I reviewed the patient's new clinical results.    Medication Review:   Scheduled Meds:[Held by provider] amLODIPine, 5 mg, Oral, Q24H  budesonide, 0.5 mg, Nebulization, BID - RT  [Held by provider] citalopram, 40 mg, Oral, Daily  [Held by provider] cloNIDine, 0.2 mg, Oral, Q24H  enoxaparin sodium, 1 mg/kg, Subcutaneous, Q24H  ipratropium-albuterol, 3 mL, Nebulization, TID  metoprolol tartrate, 12.5 mg, Oral, Q12H  nicotine, 1 patch, Transdermal, Daily  pantoprazole, 40 mg, Oral, Q AM  piperacillin-tazobactam, 3.375 g, Intravenous, Q8H  polyethylene glycol, 17 g, Oral, Daily  predniSONE, 20 mg, Oral, Daily With Breakfast  senna-docusate sodium, 2 tablet, Oral, BID  sodium chloride, 10 mL, Intravenous, Q12H  terazosin, 2 mg, Oral, Nightly      Continuous Infusions:Pharmacy to Dose enoxaparin (LOVENOX),   Pharmacy To Dose:,       PRN Meds:.  acetaminophen    senna-docusate sodium **AND** polyethylene glycol **AND** bisacodyl **AND** bisacodyl    Calcium Replacement - Follow Nurse / BPA Driven Protocol    HYDROcodone-acetaminophen    ipratropium-albuterol    Magnesium Standard Dose Replacement - Follow Nurse / BPA Driven Protocol    nitroglycerin    ondansetron ODT **OR** ondansetron    Pharmacy  to Dose enoxaparin (LOVENOX)    Pharmacy To Dose:    Phosphorus Replacement - Follow Nurse / BPA Driven Protocol    Potassium Replacement - Follow Nurse / BPA Driven Protocol    [COMPLETED] Insert Peripheral IV **AND** sodium chloride    sodium chloride    sodium chloride     Interval History:    Assessment & Plan     Sepsis, unspecified organism  Weakness  Leukocytosis  Elevated lactic acid level  Left hip pain  SUZETTE (acute kidney injury)  CKD (chronic kidney disease) stage 3, GFR 30-59 ml/min  Anemia  Hypertension  Atrial fibrillation  Hyperlipidemia  Coronary artery disease  Hypokalemia  Constipation  Falls frequently  Dizziness  Hard of hearing  Body mass index (BMI) of 19.0 to 19.9 in adult  Cigarette smoker    Plan of care   Patient presented to the emergency department as instructed after his labs revealed a white count of 42,000.  Reports feeling unwell for several weeks.  He reports generalized weakness, low back pain and left hip without radiation down legs or loss of bladder or bowel.  Reports nausea decreased urine and constipation along with dizziness and multiple falls.He lives alone and does not routinely take medications as prescribed as noted full bottles of medications at bedside. He does intermittently take 100mg of Metoprolol.     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  - Patient appears to have widely metastatic disease  - Oncology following  - Leukocytosis most likely related to mental status  - Fluid bolus completed/IV antibiotics    SUZETTE (acute kidney injury)  CKD (chronic kidney disease) stage 3, GFR 30-59 ml/min  Anemia  Hypokalemia  - Infection improving  - Monitor replace electrolytes as needed    Hypertension  Atrial fibrillation  Hyperlipidemia  Coronary artery disease  -Cardiology following  - Patient had new onset atrial fibrillation with RVR  - Continue beta-blocker/Lovenox for  anticoagulation  - Normally hypertensive however patient has been-hypotensive with improvement with IV fluid  -Continue low-dose metoprolol hold other blood pressure medication    Constipation  Falls frequently  Dizziness  Hard of hearing  Body mass index (BMI) of 19.0 to 19.9 in adult  Cigarette smoker    Plan for disposition:[facility]    RAAD Armstrong  06/28/25  10:18 EDT

## 2025-06-28 NOTE — PROGRESS NOTES
Hematology/Oncology Inpatient Consultation    Patient name: Jeremias Negro  : 1940  MRN: [id number]  Referring Provider: Dr. Vale MANUEL  Reason for Consultation: Metastatic malignancy, leukocytosis     Chief complaint: Weakness     History of present illness:    Jeremias Negro is a 84 y.o. male with past medical history significant for CAD s/p CABG, HTN, HLD, current smoker who presented to [facility] on 2025 after abnormal blood counts noted by PCP.  He was instructed to come to the hospital after having a white blood cell count of 42,000.  He states he has been feeling unwell for several weeks with generalized weakness, low back pain and left hip pain.  Reports intermittent nausea, decreased urination and constipation along with dizziness that has resulted in multiple falls.  He denies any loss of consciousness.  He does note his left lower leg and ankle have been swelling as well.  Patient reports he is a current smoker at about 1.5 packs/day.     He was treated for Covid-19 infection, exacerbation of likely underlying COPD, and SUZETTE in January at [facility] and had a normal WBC at that time. His weight in January was 133lbs.     In the ER, he was hypotensive with sBP in 80's upon arrival.  Sepsis fluid bolus given along with BC drawn and zosyn initated. BP noted improvement  WBC 48.42 with HGB of 9.9, HCT 32.1. Lactic 3.3. K 3.3 with 20 meq given.  Creatinine 2.03 with GFR 31.9, alk phose 124, albumin 2.9.  CXR was non specific with vague reticulonodular airspace opacities suspicious for infectious/inflammatory process.  Was admitted for further workup and management of leukocytosis, SUZETTE, weakness and possible sepsis.     2025 CT chest abdomen pelvis without contrast  Impression:  Please note this is a motion-degraded, noncontrast exam which makes assessment somewhat suboptimal. Within these confines, there is widely metastatic malignancy throughout the chest, abdomen, and  pelvis as summarized below. Recommend tissue sampling.     Chest:  - Bulky metastatic mediastinal lymphadenopathy.  - Large metastatic soft tissue implant in the right shoulder.  - Small metastatic implant in the epicardial fat.  - Indeterminate right upper lobe pulmonary nodule.  - Bibasilar bronchial wall thickening and mucoid impaction, suggestive of an infectious/inflammatory process. Areas of interlobular septal thickening may reflect mild pulmonary edema. Background of emphysematous and chronic changes of the lungs.     Abdomen/pelvis:  - Large mass replacing the left kidney.  - Innumerable metastatic implants in the peritoneum, bilateral retroperitoneum, and mesentery.  - Numerous metastatic intramuscular and soft tissue implants.  - Small volume pelvic ascites.     6/25/2025 CT head without contrast  Impression:   Atrophy and chronic microvascular ischemic change. No acute intracranial process. Right mastoiditis.      Patient underwent duplex ultrasound of bilateral lower extremities due to his leg swelling.  This showed no DVT.  There was an irregular, heterogeneous, predominantly hypoechoic, nonvascular echolucency and subcutaneous tissue of the left groin.     Renal ultrasound showed left renal mass measuring up to 4.2 x 5.3 by 5.4 cm with resulting mild left-sided hydronephrosis.       06/25/25  Hematology/Oncology was consulted.patient seen today for initial evaluation. He appears to have somewhat limited understanding of his medical issues. He denied having any significant medical issues, however later admitted having increased urinary frequency over last few weeks and some ongoing constipation. Denied any weight loss, changes in appetite, progressive fatigue, abd pain, hematuria, diarrhea Nausea/vomiting etc. He reported being independent in his ADLs,lives by himself and is assisted by his son and daughter in some activities who live close to his home.    Subjective:  6/28/2025: Feels somewhat better.  He has been able to eat some. Seems confused at times.      He/She  has a past medical history of Coronary artery disease, Hyperlipidemia, and Hypertension.    PCP: Tomer Azevedo MD    History:  Past Medical History:   Diagnosis Date    Coronary artery disease     Hyperlipidemia     Hypertension    , History reviewed. No pertinent surgical history., History reviewed. No pertinent family history.,   Social History     Tobacco Use    Smoking status: Every Day     Current packs/day: 2.00     Average packs/day: 2.0 packs/day for 16.5 years (33.0 ttl pk-yrs)     Types: Cigarettes     Start date: 2009   Vaping Use    Vaping status: Never Used   Substance Use Topics    Alcohol use: No    Drug use: Never   ,   Medications Prior to Admission   Medication Sig Dispense Refill Last Dose/Taking    metoprolol tartrate (LOPRESSOR) 100 MG tablet METOPROLOL TARTRATE 100 MG TABS   6/23/2025 Evening    amLODIPine (NORVASC) 5 MG tablet Daily.   More than a month    citalopram (CeleXA) 40 MG tablet Take 1 tablet by mouth Daily.  12 More than a month    CloNIDine (CATAPRES) 0.2 MG tablet Daily.   More than a month    HYDROcodone-acetaminophen (NORCO)  MG per tablet Take 1 tablet by mouth 4 (Four) Times a Day.  0 More than a month    omeprazole (priLOSEC) 20 MG capsule OMEPRAZOLE 20 MG CPDR   More than a month    terazosin (HYTRIN) 2 MG capsule TERAZOSIN HCL 2 MG CAPS   More than a month   , Scheduled Meds:  [Held by provider] amLODIPine, 5 mg, Oral, Q24H  budesonide, 0.5 mg, Nebulization, BID - RT  [Held by provider] citalopram, 40 mg, Oral, Daily  [Held by provider] cloNIDine, 0.2 mg, Oral, Q24H  enoxaparin sodium, 1 mg/kg, Subcutaneous, Q24H  ipratropium-albuterol, 3 mL, Nebulization, TID  metoprolol tartrate, 12.5 mg, Oral, Q12H  nicotine, 1 patch, Transdermal, Daily  pantoprazole, 40 mg, Oral, Q AM  piperacillin-tazobactam, 3.375 g, Intravenous, Q8H  polyethylene glycol, 17 g, Oral, Daily  predniSONE, 20 mg, Oral, Daily With  Breakfast  senna-docusate sodium, 2 tablet, Oral, BID  sodium chloride, 10 mL, Intravenous, Q12H  terazosin, 2 mg, Oral, Nightly    , Continuous Infusions:  Pharmacy to Dose enoxaparin (LOVENOX),   Pharmacy To Dose:,     , PRN Meds:    acetaminophen    senna-docusate sodium **AND** polyethylene glycol **AND** bisacodyl **AND** bisacodyl    Calcium Replacement - Follow Nurse / BPA Driven Protocol    HYDROcodone-acetaminophen    ipratropium-albuterol    Magnesium Standard Dose Replacement - Follow Nurse / BPA Driven Protocol    nitroglycerin    ondansetron ODT **OR** ondansetron    Pharmacy to Dose enoxaparin (LOVENOX)    Pharmacy To Dose:    Phosphorus Replacement - Follow Nurse / BPA Driven Protocol    Potassium Replacement - Follow Nurse / BPA Driven Protocol    [COMPLETED] Insert Peripheral IV **AND** sodium chloride    sodium chloride    sodium chloride   Allergies:  Patient has no known allergies.    Subjective     ROS:  Review of Systems   Constitutional:  Positive for activity change, appetite change and fatigue. Negative for chills, diaphoresis, fever and unexpected weight change.   HENT: Negative.  Negative for congestion, dental problem, drooling, ear discharge, ear pain, facial swelling, hearing loss, mouth sores, nosebleeds, postnasal drip, rhinorrhea, sinus pressure, sinus pain, sneezing, sore throat, tinnitus, trouble swallowing and voice change.    Eyes: Negative.  Negative for photophobia, pain, discharge, redness, itching and visual disturbance.   Respiratory: Negative.  Negative for apnea, cough, choking, chest tightness, shortness of breath, wheezing and stridor.    Cardiovascular: Negative.  Negative for chest pain, palpitations and leg swelling.   Gastrointestinal: Negative.  Negative for abdominal distention, abdominal pain, anal bleeding, blood in stool, constipation, diarrhea, nausea, rectal pain and vomiting.   Endocrine: Negative.  Negative for cold intolerance, heat intolerance, polydipsia  "and polyuria.   Genitourinary: Negative.  Negative for decreased urine volume, difficulty urinating, dysuria, flank pain, frequency, genital sores, hematuria and urgency.   Musculoskeletal: Negative.  Negative for arthralgias, back pain, gait problem, joint swelling, myalgias, neck pain and neck stiffness.   Skin: Negative.  Negative for color change, pallor and rash.   Allergic/Immunologic: Negative.    Neurological:  Positive for dizziness. Negative for tremors, seizures, syncope, facial asymmetry, speech difficulty, weakness, light-headedness, numbness and headaches.   Hematological: Negative.  Negative for adenopathy. Does not bruise/bleed easily.   Psychiatric/Behavioral: Negative.  Negative for agitation, behavioral problems, confusion, decreased concentration, hallucinations, self-injury, sleep disturbance and suicidal ideas. The patient is not nervous/anxious.       Objective   Vital Signs:   /59 (BP Location: Right arm, Patient Position: Lying)   Pulse 98   Temp 97.3 °F (36.3 °C) (Oral)   Resp 19   Ht 177.8 cm (70\")   Wt 65.9 kg (145 lb 4.5 oz)   SpO2 100%   BMI 20.85 kg/m²     Physical Exam: (performed by MD)  Physical Exam  Constitutional:       General: He is not in acute distress.     Appearance: He is normal weight. He is ill-appearing. He is not toxic-appearing or diaphoretic.   HENT:      Head: Normocephalic and atraumatic.      Right Ear: External ear normal.      Left Ear: External ear normal.      Nose: Nose normal.      Mouth/Throat:      Mouth: Mucous membranes are moist.      Pharynx: Oropharynx is clear.   Eyes:      General: No scleral icterus.        Right eye: No discharge.         Left eye: No discharge.      Extraocular Movements: Extraocular movements intact.      Conjunctiva/sclera: Conjunctivae normal.      Pupils: Pupils are equal, round, and reactive to light.   Cardiovascular:      Rate and Rhythm: Normal rate and regular rhythm.      Pulses: Normal pulses.      Heart " sounds: Normal heart sounds. No murmur heard.     No friction rub. No gallop.   Pulmonary:      Effort: Pulmonary effort is normal. No respiratory distress.      Breath sounds: No stridor. No wheezing, rhonchi or rales.      Comments: Breath sounds markedly diminished bilaterally.   Chest:      Chest wall: No tenderness.   Abdominal:      General: Bowel sounds are normal. There is no distension.      Palpations: Abdomen is soft. There is no mass.      Tenderness: There is no abdominal tenderness. There is no right CVA tenderness, left CVA tenderness, guarding or rebound.   Musculoskeletal:         General: No tenderness, deformity or signs of injury. Normal range of motion.      Cervical back: Normal range of motion and neck supple. No rigidity.      Right lower leg: No edema.      Left lower leg: No edema.   Lymphadenopathy:      Cervical: No cervical adenopathy.   Skin:     General: Skin is warm and dry.      Coloration: Skin is not jaundiced.      Findings: No bruising or rash.   Neurological:      General: No focal deficit present.      Mental Status: He is alert and oriented to person, place, and time.      Cranial Nerves: No cranial nerve deficit.   Psychiatric:      Comments: Responsive. In good spirits, at times seems confused.      I Alfonzo Koroma MD performed the physical exam on 6/28/2025 as documented above.     Results Review:  Lab Results (last 48 hours)       Procedure Component Value Units Date/Time    Tissue Pathology Exam [[id number]] Collected: 06/26/25 1537    Specimen: Tissue from Peritoneum Updated: 06/27/25 0749    CBC & Differential [[id number]]  (Abnormal) Collected: 06/27/25 0030    Specimen: Blood Updated: 06/27/25 0124    Narrative:      The following orders were created for panel order CBC & Differential.  Procedure                               Abnormality         Status                     ---------                               -----------         ------                     CBC Auto  Differential[[id number]]        Abnormal            Final result               Scan Slide[[id number]]                                       Final result                 Please view results for these tests on the individual orders.    CBC Auto Differential [[id number]]  (Abnormal) Collected: 06/27/25 0030    Specimen: Blood Updated: 06/27/25 0124     WBC 80.91 10*3/mm3      RBC 3.07 10*6/mm3      Hemoglobin 9.0 g/dL      Hematocrit 29.6 %      MCV 96.4 fL      MCH 29.3 pg      MCHC 30.4 g/dL      RDW 18.1 %      RDW-SD 62.9 fl      MPV 9.5 fL      Platelets 317 10*3/mm3     Narrative:      The previously reported component NRBC is no longer being reported. Previous result was 0.0 /100 WBC (Reference Range: 0.0-0.2 /100 WBC) on 6/27/2025 at 0039 EDT.    Scan Slide [[id number]] Collected: 06/27/25 0030    Specimen: Blood Updated: 06/27/25 0124     Scan Slide --     Comment: See Manual Differential Results       Manual Differential [[id number]]  (Abnormal) Collected: 06/27/25 0030    Specimen: Blood Updated: 06/27/25 0124     Neutrophil % 69.0 %      Lymphocyte % 4.0 %      Bands %  26.0 %      Metamyelocyte % 1.0 %      Neutrophils Absolute 76.86 10*3/mm3      Lymphocytes Absolute 3.24 10*3/mm3      Crenated RBC's Slight/1+     Dacrocytes Slight/1+     Poikilocytes Slight/1+     Polychromasia Slight/1+     WBC Morphology Normal     Platelet Estimate Adequate    Basic Metabolic Panel [[id number]]  (Abnormal) Collected: 06/27/25 0030    Specimen: Blood Updated: 06/27/25 0056     Glucose 127 mg/dL      BUN 33.9 mg/dL      Creatinine 1.98 mg/dL      Sodium 140 mmol/L      Potassium 3.7 mmol/L      Chloride 107 mmol/L      CO2 15.3 mmol/L      Calcium 8.8 mg/dL      BUN/Creatinine Ratio 17.1     Anion Gap 17.7 mmol/L      eGFR 32.9 mL/min/1.73     Narrative:      GFR Categories in Chronic Kidney Disease (CKD)              GFR Category          GFR (mL/min/1.73)    Interpretation  G1                    90 or greater        Normal  or high (1)  G2                    60-89                Mild decrease (1)  G3a                   45-59                Mild to moderate decrease  G3b                   30-44                Moderate to severe decrease  G4                    15-29                Severe decrease  G5                    14 or less           Kidney failure    (1)In the absence of evidence of kidney disease, neither GFR category G1 or G2 fulfill the criteria for CKD.    eGFR calculation 2021 CKD-EPI creatinine equation, which does not include race as a factor    Blood Culture - Blood, Arm, Left [[id number]]  (Normal) Collected: 06/24/25 1720    Specimen: Blood from Arm, Left Updated: 06/26/25 1730     Blood Culture No growth at 2 days    Blood Culture - Blood, Hand, Left [[id number]]  (Normal) Collected: 06/24/25 1720    Specimen: Blood from Hand, Left Updated: 06/26/25 1730     Blood Culture No growth at 2 days    Haptoglobin [[id number]]  (Abnormal) Collected: 06/26/25 0428    Specimen: Blood from Arm, Left Updated: 06/26/25 1033     Haptoglobin 232 mg/dL      Comment: Specimen hemolyzed. Results may be affected.       Uric Acid [[id number]]  (Abnormal) Collected: 06/26/25 0428    Specimen: Blood from Arm, Left Updated: 06/26/25 0612     Uric Acid 8.5 mg/dL     Basic Metabolic Panel [[id number]]  (Abnormal) Collected: 06/26/25 0428    Specimen: Blood from Arm, Left Updated: 06/26/25 0518     Glucose 160 mg/dL      BUN 29.3 mg/dL      Creatinine 1.95 mg/dL      Sodium 140 mmol/L      Potassium 4.1 mmol/L      Chloride 107 mmol/L      CO2 11.8 mmol/L      Calcium 8.6 mg/dL      BUN/Creatinine Ratio 15.0     Anion Gap 21.2 mmol/L      eGFR 33.5 mL/min/1.73     Narrative:      GFR Categories in Chronic Kidney Disease (CKD)              GFR Category          GFR (mL/min/1.73)    Interpretation  G1                    90 or greater        Normal or high (1)  G2                    60-89                Mild decrease (1)  G3a                   45-59                 Mild to moderate decrease  G3b                   30-44                Moderate to severe decrease  G4                    15-29                Severe decrease  G5                    14 or less           Kidney failure    (1)In the absence of evidence of kidney disease, neither GFR category G1 or G2 fulfill the criteria for CKD.    eGFR calculation 2021 CKD-EPI creatinine equation, which does not include race as a factor    Occult Blood, Fecal By Immunoassay - Stool, Per Rectum [[id number]]  (Normal) Collected: 06/26/25 0307    Specimen: Stool from Per Rectum Updated: 06/26/25 0346     Occult Blood, Fecal by Immunoassay Negative    Protime-INR [[id number]]  (Abnormal) Collected: 06/26/25 0148    Specimen: Blood Updated: 06/26/25 0228     Protime 18.0 Seconds      INR 1.49    aPTT [[id number]]  (Normal) Collected: 06/26/25 0148    Specimen: Blood Updated: 06/26/25 0228     PTT 30.1 seconds     CBC & Differential [[id number]]  (Abnormal) Collected: 06/26/25 0148    Specimen: Blood Updated: 06/26/25 0215    Narrative:      The following orders were created for panel order CBC & Differential.  Procedure                               Abnormality         Status                     ---------                               -----------         ------                     CBC Auto Differential[[id number]]        Abnormal            Final result               Scan Slide[[id number]]                                                                    Please view results for these tests on the individual orders.    CBC Auto Differential [[id number]]  (Abnormal) Collected: 06/26/25 0148    Specimen: Blood Updated: 06/26/25 0215     WBC 68.74 10*3/mm3      RBC 3.04 10*6/mm3      Hemoglobin 9.0 g/dL      Hematocrit 29.3 %      MCV 96.4 fL      MCH 29.6 pg      MCHC 30.7 g/dL      RDW 17.8 %      RDW-SD 61.6 fl      MPV 9.8 fL      Platelets 266 10*3/mm3      Neutrophil % 92.7 %      Lymphocyte % 1.8 %      Monocyte % 0.9 %       Eosinophil % 0.0 %      Basophil % 0.3 %      Immature Grans % 4.3 %      Neutrophils, Absolute 63.79 10*3/mm3      Lymphocytes, Absolute 1.21 10*3/mm3      Monocytes, Absolute 0.59 10*3/mm3      Eosinophils, Absolute 0.00 10*3/mm3      Basophils, Absolute 0.19 10*3/mm3      Immature Grans, Absolute 2.96 10*3/mm3      nRBC 0.0 /100 WBC     Reticulocytes [[id number]]  (Abnormal) Collected: 06/26/25 0148    Specimen: Blood Updated: 06/26/25 0208     Reticulocyte % 2.46 %      Reticulocyte Absolute 0.0758 10*6/mm3     Lactate Dehydrogenase [[id number]]  (Abnormal) Collected: 06/25/25 0000    Specimen: Blood from Arm, Right Updated: 06/25/25 1702      U/L      Comment: Specimen hemolyzed.  Results may be affected.       Lactic Acid, Plasma [[id number]]  (Abnormal) Collected: 06/25/25 1429    Specimen: Blood Updated: 06/25/25 1458     Lactate 4.7 mmol/L            Imaging Reviewed:   US Guided Soft Tissue/Muscle Biopsy  Result Date: 6/26/2025  Successful ultrasound-guided core biopsy of left lower quadrant soft tissue mass. Report dictated by: Raul Verde DNP  I have personally reviewed this case and agree with the findings above: Electronically Signed: Angela Eason MD  6/26/2025 4:11 PM EDT  Workstation ID: [id number]    CT Chest Without Contrast Diagnostic  Result Date: 6/25/2025  Impression: Please note this is a motion-degraded, noncontrast exam which makes assessment somewhat suboptimal. Within these confines, there is widely metastatic malignancy throughout the chest, abdomen, and pelvis as summarized below. Recommend tissue sampling. Chest: - Bulky metastatic mediastinal lymphadenopathy. - Large metastatic soft tissue implant in the right shoulder. - Small metastatic implant in the epicardial fat. - Indeterminate right upper lobe pulmonary nodule. - Bibasilar bronchial wall thickening and mucoid impaction, suggestive of an infectious/inflammatory process. Areas of interlobular septal thickening may reflect mild  pulmonary edema. Background of emphysematous and chronic changes of the lungs. Abdomen/pelvis: - Large mass replacing the left kidney. - Innumerable metastatic implants in the peritoneum, bilateral retroperitoneum, and mesentery. - Numerous metastatic intramuscular and soft tissue implants. - Small volume pelvic ascites. Electronically Signed: Rodger Mckeon MD  6/25/2025 8:29 AM EDT  Workstation ID: [id number]    CT Abdomen Pelvis Without Contrast  Result Date: 6/25/2025  Impression: Please note this is a motion-degraded, noncontrast exam which makes assessment somewhat suboptimal. Within these confines, there is widely metastatic malignancy throughout the chest, abdomen, and pelvis as summarized below. Recommend tissue sampling. Chest: - Bulky metastatic mediastinal lymphadenopathy. - Large metastatic soft tissue implant in the right shoulder. - Small metastatic implant in the epicardial fat. - Indeterminate right upper lobe pulmonary nodule. - Bibasilar bronchial wall thickening and mucoid impaction, suggestive of an infectious/inflammatory process. Areas of interlobular septal thickening may reflect mild pulmonary edema. Background of emphysematous and chronic changes of the lungs. Abdomen/pelvis: - Large mass replacing the left kidney. - Innumerable metastatic implants in the peritoneum, bilateral retroperitoneum, and mesentery. - Numerous metastatic intramuscular and soft tissue implants. - Small volume pelvic ascites. Electronically Signed: Rodger Mckeon MD  6/25/2025 8:29 AM EDT  Workstation ID: [id number]    CT Head Without Contrast  Result Date: 6/25/2025  Impression: Atrophy and chronic microvascular ischemic change. No acute intracranial process. Right mastoiditis. Electronically Signed: Mike García MD  6/25/2025 5:54 AM EDT  Workstation ID: [id number]    US Renal Bilateral  Result Date: 6/25/2025  Impression: Partially obstructing left renal collecting system from a left renal mass. Please correlate with CT  abdomen/pelvis. Electronically Signed: Mike García MD  6/25/2025 3:43 AM EDT  Workstation ID: [id number]    XR Chest 1 View  Result Date: 6/24/2025  Impression: Vague reticulonodular airspace opacities suspicious for infectious/inflammatory process. Etiologies may include bronchiolitis, aspiration or developing bronchopneumonia. 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  -Reviewed these findings with patient, explained to him that the overall picture is concerning for metastati RCC. Also discussed the importance of tissue diagnosis to consider treatment options.  - Status post left lower quadrant mass biopsy on 6/26/2025.  Pathology is pending.  - Would consider CNS imaging if confirmed to have renal malignancy.  MRI is likely contraindicated due to presence of metallic implant in chest.  Awaiting the final report of pathology.      Leukocytosis with neutrophilia:  -noted WBC count 48.5k on presentation with Left shift.  Noted to have uptrending WBC count to 80.9 with significant left shift.  Steroid use is likely contributing.  This is  Likely reactive to malignancy.  Ordered peripheral blood flow cytometry, this is pending.  Low concern for primary hematologic neoplasm   -Patient is being managed for suspected sepsis with IV antibiotics and fluids  Continue antibiotics.      Normocytic anemia  Folate Deficiency:  - Iron studies not consistent with iron deficiency.  Will assess for further nutritional deficiencies and hemolysis.  Continue to monitor blood counts.  - Folate deficiency noted. Recommend daily folic  acid supplement.    Alfonzo Koroma MD on 6/28/2025 at 14:27

## 2025-06-28 NOTE — PLAN OF CARE
Problem: Adult Inpatient Plan of Care  Goal: Plan of Care Review  Outcome: Progressing  Goal: Patient-Specific Goal (Individualized)  Outcome: Progressing  Goal: Absence of Hospital-Acquired Illness or Injury  Outcome: Progressing  Intervention: Identify and Manage Fall Risk  Recent Flowsheet Documentation  Taken 6/28/2025 1600 by Mckayla Bear RN  Safety Promotion/Fall Prevention:   safety round/check completed   room organization consistent   nonskid shoes/slippers when out of bed   lighting adjusted   clutter free environment maintained   assistive device/personal items within reach  Taken 6/28/2025 1400 by Mckayla Bear RN  Safety Promotion/Fall Prevention:   safety round/check completed   room organization consistent   nonskid shoes/slippers when out of bed   lighting adjusted   clutter free environment maintained   assistive device/personal items within reach  Taken 6/28/2025 1200 by Mckayla Bear RN  Safety Promotion/Fall Prevention:   safety round/check completed   room organization consistent   nonskid shoes/slippers when out of bed   lighting adjusted   clutter free environment maintained   assistive device/personal items within reach  Taken 6/28/2025 1000 by Mckayla Bear RN  Safety Promotion/Fall Prevention:   safety round/check completed   room organization consistent   nonskid shoes/slippers when out of bed   lighting adjusted   clutter free environment maintained   assistive device/personal items within reach  Taken 6/28/2025 0800 by Mckayla Bear RN  Safety Promotion/Fall Prevention:   safety round/check completed   room organization consistent   nonskid shoes/slippers when out of bed   lighting adjusted   clutter free environment maintained   assistive device/personal items within reach  Intervention: Prevent Skin Injury  Recent Flowsheet Documentation  Taken 6/28/2025 1600 by Mckayla Bear RN  Skin Protection: incontinence pads utilized  Taken 6/28/2025 1200 by Mckayla Bear RN  Skin Protection:  incontinence pads utilized  Taken 6/28/2025 0800 by Mckayla Bear RN  Skin Protection: incontinence pads utilized  Intervention: Prevent and Manage VTE (Venous Thromboembolism) Risk  Recent Flowsheet Documentation  Taken 6/28/2025 0800 by Mckayla Bear RN  VTE Prevention/Management:   SCDs (sequential compression devices) off   patient refused intervention  Intervention: Prevent Infection  Recent Flowsheet Documentation  Taken 6/28/2025 1600 by Mckayla Bear RN  Infection Prevention:   environmental surveillance performed   hand hygiene promoted   rest/sleep promoted   single patient room provided  Taken 6/28/2025 1400 by Mckayla Bear RN  Infection Prevention:   environmental surveillance performed   hand hygiene promoted   rest/sleep promoted   single patient room provided  Taken 6/28/2025 1200 by Mckayla Bear RN  Infection Prevention:   environmental surveillance performed   hand hygiene promoted   rest/sleep promoted   single patient room provided  Taken 6/28/2025 1000 by Mckayla Bear RN  Infection Prevention:   environmental surveillance performed   hand hygiene promoted   rest/sleep promoted   single patient room provided  Taken 6/28/2025 0800 by Mckayla Bear RN  Infection Prevention:   environmental surveillance performed   hand hygiene promoted   rest/sleep promoted   single patient room provided  Goal: Optimal Comfort and Wellbeing  Outcome: Progressing  Intervention: Provide Person-Centered Care  Recent Flowsheet Documentation  Taken 6/28/2025 1600 by Mckayla Bear RN  Trust Relationship/Rapport:   choices provided   care explained  Taken 6/28/2025 1200 by Mckayla Bear RN  Trust Relationship/Rapport:   care explained   choices provided  Taken 6/28/2025 0800 by Mckayla Bear RN  Trust Relationship/Rapport:   care explained   choices provided   questions encouraged   questions answered  Goal: Readiness for Transition of Care  Outcome: Progressing     Problem: Skin Injury Risk Increased  Goal: Skin Health  and Integrity  Outcome: Progressing  Intervention: Optimize Skin Protection  Recent Flowsheet Documentation  Taken 6/28/2025 1600 by Mckayla eBar RN  Pressure Reduction Techniques: frequent weight shift encouraged  Pressure Reduction Devices:   positioning supports utilized   pressure-redistributing mattress utilized  Skin Protection: incontinence pads utilized  Taken 6/28/2025 1200 by Mckayla Bear RN  Pressure Reduction Techniques: frequent weight shift encouraged  Pressure Reduction Devices:   pressure-redistributing mattress utilized   positioning supports utilized  Skin Protection: incontinence pads utilized  Taken 6/28/2025 0800 by Mckayla Bear RN  Pressure Reduction Techniques: frequent weight shift encouraged  Pressure Reduction Devices:   pressure-redistributing mattress utilized   positioning supports utilized  Skin Protection: incontinence pads utilized     Problem: Fall Injury Risk  Goal: Absence of Fall and Fall-Related Injury  Outcome: Progressing  Intervention: Identify and Manage Contributors  Recent Flowsheet Documentation  Taken 6/28/2025 1600 by Mckayla Bear RN  Medication Review/Management: medications reviewed  Taken 6/28/2025 1400 by Mckayla Bear RN  Medication Review/Management: medications reviewed  Taken 6/28/2025 1200 by Mckayla eBar RN  Medication Review/Management: medications reviewed  Taken 6/28/2025 1000 by Mckayla Bear RN  Medication Review/Management: medications reviewed  Taken 6/28/2025 0800 by Mckayla Bear RN  Medication Review/Management: medications reviewed  Intervention: Promote Injury-Free Environment  Recent Flowsheet Documentation  Taken 6/28/2025 1600 by Mckayla Bear RN  Safety Promotion/Fall Prevention:   safety round/check completed   room organization consistent   nonskid shoes/slippers when out of bed   lighting adjusted   clutter free environment maintained   assistive device/personal items within reach  Taken 6/28/2025 1400 by Mckayla Bear RN  Safety  Promotion/Fall Prevention:   safety round/check completed   room organization consistent   nonskid shoes/slippers when out of bed   lighting adjusted   clutter free environment maintained   assistive device/personal items within reach  Taken 6/28/2025 1200 by Mckayla Bear RN  Safety Promotion/Fall Prevention:   safety round/check completed   room organization consistent   nonskid shoes/slippers when out of bed   lighting adjusted   clutter free environment maintained   assistive device/personal items within reach  Taken 6/28/2025 1000 by Mckayla Bear RN  Safety Promotion/Fall Prevention:   safety round/check completed   room organization consistent   nonskid shoes/slippers when out of bed   lighting adjusted   clutter free environment maintained   assistive device/personal items within reach  Taken 6/28/2025 0800 by Mckayla Bear RN  Safety Promotion/Fall Prevention:   safety round/check completed   room organization consistent   nonskid shoes/slippers when out of bed   lighting adjusted   clutter free environment maintained   assistive device/personal items within reach     Problem: Comorbidity Management  Goal: Blood Pressure in Desired Range  Outcome: Progressing  Intervention: Maintain Blood Pressure Management  Recent Flowsheet Documentation  Taken 6/28/2025 1600 by Mckayla Bear RN  Medication Review/Management: medications reviewed  Taken 6/28/2025 1400 by Mckayla Bear RN  Medication Review/Management: medications reviewed  Taken 6/28/2025 1200 by Mckayla Bear RN  Medication Review/Management: medications reviewed  Taken 6/28/2025 1000 by Mckayla Bear RN  Medication Review/Management: medications reviewed  Taken 6/28/2025 0800 by Mckayla Bear RN  Medication Review/Management: medications reviewed     Problem: Electrolyte Imbalance  Goal: Electrolyte Balance  Outcome: Progressing     Problem: Sepsis/Septic Shock  Goal: Optimal Coping  Outcome: Progressing  Intervention: Support Patient and Family  Response  Recent Flowsheet Documentation  Taken 6/28/2025 1600 by Mckayla Bear RN  Family/Support System Care: self-care encouraged  Taken 6/28/2025 1200 by Mckayla Bear RN  Family/Support System Care: self-care encouraged  Taken 6/28/2025 0800 by Mckayla Bear RN  Family/Support System Care:   self-care encouraged   support provided  Goal: Absence of Bleeding  Outcome: Progressing  Goal: Blood Glucose Level Within Target Range  Outcome: Progressing  Goal: Absence of Infection Signs and Symptoms  Outcome: Progressing  Intervention: Initiate Sepsis Management  Recent Flowsheet Documentation  Taken 6/28/2025 1600 by Mckayla Bear RN  Infection Prevention:   environmental surveillance performed   hand hygiene promoted   rest/sleep promoted   single patient room provided  Taken 6/28/2025 1400 by Mckayla Bear RN  Infection Prevention:   environmental surveillance performed   hand hygiene promoted   rest/sleep promoted   single patient room provided  Taken 6/28/2025 1200 by Mckayla Bear RN  Infection Prevention:   environmental surveillance performed   hand hygiene promoted   rest/sleep promoted   single patient room provided  Taken 6/28/2025 1000 by Mckayla Bear RN  Infection Prevention:   environmental surveillance performed   hand hygiene promoted   rest/sleep promoted   single patient room provided  Taken 6/28/2025 0800 by Mckayla Bear RN  Infection Prevention:   environmental surveillance performed   hand hygiene promoted   rest/sleep promoted   single patient room provided  Intervention: Promote Recovery  Recent Flowsheet Documentation  Taken 6/28/2025 1200 by Mckayla Bear RN  Sleep/Rest Enhancement: awakenings minimized  Taken 6/28/2025 0800 by Mckayla Bear RN  Sleep/Rest Enhancement: awakenings minimized  Goal: Optimal Nutrition Delivery  Outcome: Progressing     Problem: Malnutrition  Goal: Improved Nutritional Intake  Outcome: Progressing   Goal Outcome Evaluation:

## 2025-06-29 LAB
ANION GAP SERPL CALCULATED.3IONS-SCNC: 12.1 MMOL/L (ref 5–15)
BACTERIA SPEC AEROBE CULT: NORMAL
BACTERIA SPEC AEROBE CULT: NORMAL
BASOPHILS # BLD AUTO: 0.16 10*3/MM3 (ref 0–0.2)
BASOPHILS NFR BLD AUTO: 0.2 % (ref 0–1.5)
BUN SERPL-MCNC: 34.3 MG/DL (ref 8–23)
BUN/CREAT SERPL: 16.7 (ref 7–25)
CALCIUM SPEC-SCNC: 8.8 MG/DL (ref 8.6–10.5)
CHLORIDE SERPL-SCNC: 108 MMOL/L (ref 98–107)
CO2 SERPL-SCNC: 19.9 MMOL/L (ref 22–29)
CREAT SERPL-MCNC: 2.05 MG/DL (ref 0.76–1.27)
DEPRECATED RDW RBC AUTO: 64.4 FL (ref 37–54)
EGFRCR SERPLBLD CKD-EPI 2021: 31.6 ML/MIN/1.73
EOSINOPHIL # BLD AUTO: 0.19 10*3/MM3 (ref 0–0.4)
EOSINOPHIL NFR BLD AUTO: 0.3 % (ref 0.3–6.2)
ERYTHROCYTE [DISTWIDTH] IN BLOOD BY AUTOMATED COUNT: 18.6 % (ref 12.3–15.4)
GLUCOSE SERPL-MCNC: 100 MG/DL (ref 65–99)
HCT VFR BLD AUTO: 29.6 % (ref 37.5–51)
HGB BLD-MCNC: 9 G/DL (ref 13–17.7)
IMM GRANULOCYTES # BLD AUTO: 1.86 10*3/MM3 (ref 0–0.05)
IMM GRANULOCYTES NFR BLD AUTO: 2.8 % (ref 0–0.5)
LYMPHOCYTES # BLD AUTO: 1.49 10*3/MM3 (ref 0.7–3.1)
LYMPHOCYTES NFR BLD AUTO: 2.2 % (ref 19.6–45.3)
MCH RBC QN AUTO: 29.3 PG (ref 26.6–33)
MCHC RBC AUTO-ENTMCNC: 30.4 G/DL (ref 31.5–35.7)
MCV RBC AUTO: 96.4 FL (ref 79–97)
MONOCYTES # BLD AUTO: 1.09 10*3/MM3 (ref 0.1–0.9)
MONOCYTES NFR BLD AUTO: 1.6 % (ref 5–12)
NEUTROPHILS NFR BLD AUTO: 62.28 10*3/MM3 (ref 1.7–7)
NEUTROPHILS NFR BLD AUTO: 92.9 % (ref 42.7–76)
NRBC BLD AUTO-RTO: 0 /100 WBC (ref 0–0.2)
PLATELET # BLD AUTO: 265 10*3/MM3 (ref 140–450)
PMV BLD AUTO: 9.4 FL (ref 6–12)
POTASSIUM SERPL-SCNC: 3.9 MMOL/L (ref 3.5–5.2)
RBC # BLD AUTO: 3.07 10*6/MM3 (ref 4.14–5.8)
SODIUM SERPL-SCNC: 140 MMOL/L (ref 136–145)
WBC NRBC COR # BLD AUTO: 67.07 10*3/MM3 (ref 3.4–10.8)

## 2025-06-29 PROCEDURE — 94761 N-INVAS EAR/PLS OXIMETRY MLT: CPT

## 2025-06-29 PROCEDURE — 94799 UNLISTED PULMONARY SVC/PX: CPT

## 2025-06-29 PROCEDURE — 25010000002 ENOXAPARIN PER 10 MG

## 2025-06-29 PROCEDURE — 25010000002 PIPERACILLIN SOD-TAZOBACTAM PER 1 G

## 2025-06-29 PROCEDURE — 80048 BASIC METABOLIC PNL TOTAL CA: CPT

## 2025-06-29 PROCEDURE — 63710000001 PREDNISONE PER 1 MG: Performed by: NURSE PRACTITIONER

## 2025-06-29 PROCEDURE — 25010000002 ONDANSETRON PER 1 MG: Performed by: PHYSICIAN ASSISTANT

## 2025-06-29 PROCEDURE — 94664 DEMO&/EVAL PT USE INHALER: CPT

## 2025-06-29 PROCEDURE — 85025 COMPLETE CBC W/AUTO DIFF WBC: CPT

## 2025-06-29 RX ADMIN — PREDNISONE 20 MG: 20 TABLET ORAL at 08:48

## 2025-06-29 RX ADMIN — IPRATROPIUM BROMIDE AND ALBUTEROL SULFATE 3 ML: .5; 3 SOLUTION RESPIRATORY (INHALATION) at 07:30

## 2025-06-29 RX ADMIN — IPRATROPIUM BROMIDE AND ALBUTEROL SULFATE 3 ML: .5; 3 SOLUTION RESPIRATORY (INHALATION) at 15:05

## 2025-06-29 RX ADMIN — BUDESONIDE 0.5 MG: 0.5 SUSPENSION RESPIRATORY (INHALATION) at 07:34

## 2025-06-29 RX ADMIN — PIPERACILLIN AND TAZOBACTAM 3.38 G: 3; .375 INJECTION, POWDER, FOR SOLUTION INTRAVENOUS at 21:35

## 2025-06-29 RX ADMIN — ONDANSETRON 4 MG: 2 INJECTION, SOLUTION INTRAMUSCULAR; INTRAVENOUS at 21:52

## 2025-06-29 RX ADMIN — NICOTINE 1 PATCH: 21 PATCH, EXTENDED RELEASE TRANSDERMAL at 08:49

## 2025-06-29 RX ADMIN — Medication 10 ML: at 21:08

## 2025-06-29 RX ADMIN — PANTOPRAZOLE SODIUM 40 MG: 40 TABLET, DELAYED RELEASE ORAL at 08:48

## 2025-06-29 RX ADMIN — IPRATROPIUM BROMIDE AND ALBUTEROL SULFATE 3 ML: .5; 3 SOLUTION RESPIRATORY (INHALATION) at 21:11

## 2025-06-29 RX ADMIN — PIPERACILLIN AND TAZOBACTAM 3.38 G: 3; .375 INJECTION, POWDER, FOR SOLUTION INTRAVENOUS at 13:46

## 2025-06-29 RX ADMIN — ENOXAPARIN SODIUM 70 MG: 100 INJECTION SUBCUTANEOUS at 08:47

## 2025-06-29 RX ADMIN — BUDESONIDE 0.5 MG: 0.5 SUSPENSION RESPIRATORY (INHALATION) at 21:17

## 2025-06-29 RX ADMIN — Medication 10 ML: at 13:46

## 2025-06-29 RX ADMIN — Medication 12.5 MG: at 21:08

## 2025-06-29 RX ADMIN — Medication 10 ML: at 21:52

## 2025-06-29 NOTE — PROGRESS NOTES
LOS: 5 days   Patient Care Team:  Tomer Azevedo MD as PCP - General (Family Medicine)    Subjective     Patient states he is tired    Review of Systems   Constitutional:  Positive for activity change.   HENT: Negative.     Respiratory: Negative.     Cardiovascular: Negative.    Gastrointestinal: Negative.    Genitourinary: Negative.    Musculoskeletal: Negative.    Neurological:  Positive for weakness.   Psychiatric/Behavioral: Negative.             Objective     Vital Signs  Temp:  [97.2 °F (36.2 °C)-98.6 °F (37 °C)] 97.5 °F (36.4 °C)  Heart Rate:  [] 112  Resp:  [15-23] 19  BP: (101-109)/(57-63) 101/63      Physical Exam  Vitals reviewed.   Constitutional:       Appearance: He is not ill-appearing.   HENT:      Head: Normocephalic and atraumatic.      Right Ear: External ear normal.      Left Ear: External ear normal.      Nose: Nose normal.      Mouth/Throat:      Mouth: Mucous membranes are dry.   Eyes:      General:         Right eye: No discharge.         Left eye: No discharge.   Cardiovascular:      Rate and Rhythm: Rhythm irregular.      Pulses: Normal pulses.      Heart sounds: Normal heart sounds.   Pulmonary:      Breath sounds: Normal breath sounds.   Abdominal:      General: Bowel sounds are normal.      Palpations: Abdomen is soft.   Musculoskeletal:         General: Normal range of motion.      Cervical back: Normal range of motion.   Skin:     General: Skin is warm.   Neurological:      Mental Status: He is alert and oriented to person, place, and time.   Psychiatric:         Behavior: Behavior normal.              Results Review:    Lab Results (last 24 hours)       Procedure Component Value Units Date/Time    Blood Culture - Blood, Arm, Left [[id number]]  (Normal) Collected: 06/24/25 1720    Specimen: Blood from Arm, Left Updated: 06/28/25 1731     Blood Culture No growth at 4 days    Blood Culture - Blood, Hand, Left [[id number]]  (Normal) Collected: 06/24/25 1720    Specimen: Blood  from Hand, Left Updated: 06/28/25 1731     Blood Culture No growth at 4 days    Flow Cytometry ([facility]) [[id number]] Collected: 06/28/25 1157    Specimen: Blood from Arm, Right Updated: 06/28/25 1200             Imaging Results (Last 24 Hours)       ** No results found for the last 24 hours. **                 I reviewed the patient's new clinical results.    Medication Review:   Scheduled Meds:[Held by provider] amLODIPine, 5 mg, Oral, Q24H  budesonide, 0.5 mg, Nebulization, BID - RT  [Held by provider] citalopram, 40 mg, Oral, Daily  [Held by provider] cloNIDine, 0.2 mg, Oral, Q24H  enoxaparin sodium, 1 mg/kg, Subcutaneous, Q24H  ipratropium-albuterol, 3 mL, Nebulization, TID  metoprolol tartrate, 12.5 mg, Oral, Q12H  nicotine, 1 patch, Transdermal, Daily  pantoprazole, 40 mg, Oral, Q AM  piperacillin-tazobactam, 3.375 g, Intravenous, Q8H  polyethylene glycol, 17 g, Oral, Daily  senna-docusate sodium, 2 tablet, Oral, BID  sodium chloride, 10 mL, Intravenous, Q12H  terazosin, 2 mg, Oral, Nightly      Continuous Infusions:Pharmacy to Dose enoxaparin (LOVENOX),   Pharmacy To Dose:,       PRN Meds:.  acetaminophen    senna-docusate sodium **AND** polyethylene glycol **AND** bisacodyl **AND** bisacodyl    Calcium Replacement - Follow Nurse / BPA Driven Protocol    HYDROcodone-acetaminophen    ipratropium-albuterol    Magnesium Standard Dose Replacement - Follow Nurse / BPA Driven Protocol    nitroglycerin    ondansetron ODT **OR** ondansetron    Pharmacy to Dose enoxaparin (LOVENOX)    Pharmacy To Dose:    Phosphorus Replacement - Follow Nurse / BPA Driven Protocol    Potassium Replacement - Follow Nurse / BPA Driven Protocol    [COMPLETED] Insert Peripheral IV **AND** sodium chloride    sodium chloride    sodium chloride     Interval History:    Assessment & Plan     Sepsis, unspecified organism  Weakness  Leukocytosis  Elevated lactic acid level  Left hip pain  SUZETTE (acute kidney injury)  CKD (chronic  kidney disease) stage 3, GFR 30-59 ml/min  Anemia  Hypertension  Atrial fibrillation  Hyperlipidemia  Coronary artery disease  Hypokalemia  Constipation  Falls frequently  Dizziness  Hard of hearing  Body mass index (BMI) of 19.0 to 19.9 in adult  Cigarette smoker    Plan of care   Patient presented to the emergency department as instructed after his labs revealed a white count of 42,000.  Reports feeling unwell for several weeks.  He reports generalized weakness, low back pain and left hip without radiation down legs or loss of bladder or bowel.  Reports nausea decreased urine and constipation along with dizziness and multiple falls.He lives alone and does not routinely take medications as prescribed as noted full bottles of medications at bedside. He does intermittently take 100mg of Metoprolol.     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  - Patient appears to have widely metastatic disease  - Oncology following  - Leukocytosis most likely related to mental status  - Fluid bolus completed/IV antibiotics    SUZETTE (acute kidney injury)  CKD (chronic kidney disease) stage 3, GFR 30-59 ml/min  Anemia  Hypokalemia  - Infection improving  - Monitor replace electrolytes as needed    Hypertension  Atrial fibrillation  Hyperlipidemia  Coronary artery disease  -Cardiology following  - Patient had new onset atrial fibrillation with RVR  - Continue beta-blocker/Lovenox for anticoagulation  - Normally hypertensive however patient has been-hypotensive with improvement with IV fluid  -Continue low-dose metoprolol hold other blood pressure medication    Constipation  Falls frequently  Dizziness  Hard of hearing  Body mass index (BMI) of 19.0 to 19.9 in adult  Cigarette smoker    Plan for disposition:[facility]    RAAD Armstrong  06/29/25  10:24 EDT

## 2025-06-30 LAB
ANION GAP SERPL CALCULATED.3IONS-SCNC: 14.7 MMOL/L (ref 5–15)
BASOPHILS # BLD AUTO: 0.19 10*3/MM3 (ref 0–0.2)
BASOPHILS NFR BLD AUTO: 0.3 % (ref 0–1.5)
BUN SERPL-MCNC: 33.9 MG/DL (ref 8–23)
BUN/CREAT SERPL: 16.2 (ref 7–25)
CALCIUM SPEC-SCNC: 8.6 MG/DL (ref 8.6–10.5)
CHLORIDE SERPL-SCNC: 111 MMOL/L (ref 98–107)
CO2 SERPL-SCNC: 18.3 MMOL/L (ref 22–29)
CREAT SERPL-MCNC: 2.09 MG/DL (ref 0.76–1.27)
DEPRECATED RDW RBC AUTO: 64.2 FL (ref 37–54)
EGFRCR SERPLBLD CKD-EPI 2021: 30.8 ML/MIN/1.73
EOSINOPHIL # BLD AUTO: 0.14 10*3/MM3 (ref 0–0.4)
EOSINOPHIL NFR BLD AUTO: 0.2 % (ref 0.3–6.2)
ERYTHROCYTE [DISTWIDTH] IN BLOOD BY AUTOMATED COUNT: 18.6 % (ref 12.3–15.4)
GLUCOSE SERPL-MCNC: 104 MG/DL (ref 65–99)
HCT VFR BLD AUTO: 27.5 % (ref 37.5–51)
HGB BLD-MCNC: 8.5 G/DL (ref 13–17.7)
IMM GRANULOCYTES # BLD AUTO: 2.42 10*3/MM3 (ref 0–0.05)
IMM GRANULOCYTES NFR BLD AUTO: 3.3 % (ref 0–0.5)
LYMPHOCYTES # BLD AUTO: 2.07 10*3/MM3 (ref 0.7–3.1)
LYMPHOCYTES NFR BLD AUTO: 2.9 % (ref 19.6–45.3)
MCH RBC QN AUTO: 29.6 PG (ref 26.6–33)
MCHC RBC AUTO-ENTMCNC: 30.9 G/DL (ref 31.5–35.7)
MCV RBC AUTO: 95.8 FL (ref 79–97)
MONOCYTES # BLD AUTO: 1.7 10*3/MM3 (ref 0.1–0.9)
MONOCYTES NFR BLD AUTO: 2.3 % (ref 5–12)
NEUTROPHILS NFR BLD AUTO: 65.89 10*3/MM3 (ref 1.7–7)
NEUTROPHILS NFR BLD AUTO: 91 % (ref 42.7–76)
NRBC BLD AUTO-RTO: 0 /100 WBC (ref 0–0.2)
PLATELET # BLD AUTO: 251 10*3/MM3 (ref 140–450)
PMV BLD AUTO: 9.5 FL (ref 6–12)
POTASSIUM SERPL-SCNC: 3.5 MMOL/L (ref 3.5–5.2)
QT INTERVAL: 362 MS
QTC INTERVAL: 472 MS
RBC # BLD AUTO: 2.87 10*6/MM3 (ref 4.14–5.8)
SODIUM SERPL-SCNC: 144 MMOL/L (ref 136–145)
URATE SERPL-MCNC: 9.8 MG/DL (ref 3.4–7)
WBC NRBC COR # BLD AUTO: 72.41 10*3/MM3 (ref 3.4–10.8)

## 2025-06-30 PROCEDURE — 94799 UNLISTED PULMONARY SVC/PX: CPT

## 2025-06-30 PROCEDURE — 25010000002 POTASSIUM CHLORIDE 10 MEQ/100ML SOLUTION: Performed by: INTERNAL MEDICINE

## 2025-06-30 PROCEDURE — 25010000002 ENOXAPARIN PER 10 MG

## 2025-06-30 PROCEDURE — 99232 SBSQ HOSP IP/OBS MODERATE 35: CPT

## 2025-06-30 PROCEDURE — 25010000002 ONDANSETRON PER 1 MG: Performed by: PHYSICIAN ASSISTANT

## 2025-06-30 PROCEDURE — 97530 THERAPEUTIC ACTIVITIES: CPT

## 2025-06-30 PROCEDURE — 80048 BASIC METABOLIC PNL TOTAL CA: CPT

## 2025-06-30 PROCEDURE — 93010 ELECTROCARDIOGRAM REPORT: CPT | Performed by: INTERNAL MEDICINE

## 2025-06-30 PROCEDURE — 93005 ELECTROCARDIOGRAM TRACING: CPT

## 2025-06-30 PROCEDURE — 99232 SBSQ HOSP IP/OBS MODERATE 35: CPT | Performed by: STUDENT IN AN ORGANIZED HEALTH CARE EDUCATION/TRAINING PROGRAM

## 2025-06-30 PROCEDURE — 85025 COMPLETE CBC W/AUTO DIFF WBC: CPT

## 2025-06-30 PROCEDURE — 84550 ASSAY OF BLOOD/URIC ACID: CPT | Performed by: INTERNAL MEDICINE

## 2025-06-30 RX ORDER — METOPROLOL TARTRATE 25 MG/1
25 TABLET, FILM COATED ORAL EVERY 12 HOURS SCHEDULED
Status: DISCONTINUED | OUTPATIENT
Start: 2025-06-30 | End: 2025-07-01 | Stop reason: HOSPADM

## 2025-06-30 RX ORDER — POTASSIUM CHLORIDE 7.45 MG/ML
10 INJECTION INTRAVENOUS
Status: COMPLETED | OUTPATIENT
Start: 2025-06-30 | End: 2025-06-30

## 2025-06-30 RX ORDER — ENOXAPARIN SODIUM 100 MG/ML
1 INJECTION SUBCUTANEOUS EVERY 24 HOURS
Status: DISCONTINUED | OUTPATIENT
Start: 2025-07-01 | End: 2025-07-01 | Stop reason: HOSPADM

## 2025-06-30 RX ADMIN — TERAZOSIN HYDROCHLORIDE 2 MG: 2 CAPSULE ORAL at 20:55

## 2025-06-30 RX ADMIN — Medication 10 ML: at 20:55

## 2025-06-30 RX ADMIN — HYDROCODONE BITARTRATE AND ACETAMINOPHEN 1 TABLET: 10; 325 TABLET ORAL at 09:29

## 2025-06-30 RX ADMIN — PANTOPRAZOLE SODIUM 40 MG: 40 TABLET, DELAYED RELEASE ORAL at 09:29

## 2025-06-30 RX ADMIN — Medication 12.5 MG: at 09:29

## 2025-06-30 RX ADMIN — NICOTINE 1 PATCH: 21 PATCH, EXTENDED RELEASE TRANSDERMAL at 09:30

## 2025-06-30 RX ADMIN — IPRATROPIUM BROMIDE AND ALBUTEROL SULFATE 3 ML: .5; 3 SOLUTION RESPIRATORY (INHALATION) at 06:55

## 2025-06-30 RX ADMIN — ONDANSETRON 4 MG: 2 INJECTION, SOLUTION INTRAMUSCULAR; INTRAVENOUS at 09:29

## 2025-06-30 RX ADMIN — METOPROLOL TARTRATE 25 MG: 25 TABLET, FILM COATED ORAL at 20:55

## 2025-06-30 RX ADMIN — ENOXAPARIN SODIUM 70 MG: 100 INJECTION SUBCUTANEOUS at 09:29

## 2025-06-30 RX ADMIN — Medication 10 ML: at 09:30

## 2025-06-30 RX ADMIN — POTASSIUM CHLORIDE 10 MEQ: 7.46 INJECTION, SOLUTION INTRAVENOUS at 10:09

## 2025-06-30 RX ADMIN — SENNOSIDES AND DOCUSATE SODIUM 2 TABLET: 50; 8.6 TABLET ORAL at 20:55

## 2025-06-30 RX ADMIN — ONDANSETRON 4 MG: 2 INJECTION, SOLUTION INTRAMUSCULAR; INTRAVENOUS at 20:55

## 2025-06-30 RX ADMIN — IPRATROPIUM BROMIDE AND ALBUTEROL SULFATE 3 ML: .5; 3 SOLUTION RESPIRATORY (INHALATION) at 19:49

## 2025-06-30 RX ADMIN — HYDROCODONE BITARTRATE AND ACETAMINOPHEN 1 TABLET: 10; 325 TABLET ORAL at 20:55

## 2025-06-30 RX ADMIN — IPRATROPIUM BROMIDE AND ALBUTEROL SULFATE 3 ML: .5; 3 SOLUTION RESPIRATORY (INHALATION) at 15:12

## 2025-06-30 RX ADMIN — POTASSIUM CHLORIDE 10 MEQ: 7.46 INJECTION, SOLUTION INTRAVENOUS at 09:30

## 2025-06-30 RX ADMIN — BUDESONIDE 0.5 MG: 0.5 SUSPENSION RESPIRATORY (INHALATION) at 19:45

## 2025-06-30 RX ADMIN — BUDESONIDE 0.5 MG: 0.5 SUSPENSION RESPIRATORY (INHALATION) at 06:56

## 2025-06-30 RX ADMIN — SENNOSIDES AND DOCUSATE SODIUM 2 TABLET: 50; 8.6 TABLET ORAL at 09:29

## 2025-06-30 NOTE — CASE MANAGEMENT/SOCIAL WORK
Continued Stay Note  SUNG Vargas     Patient Name: Jeremias Negro  MRN: [id number]  Today's Date: 6/30/2025    Admit Date: 6/24/2025    Plan: [facility] accepted pending bed availability. Will require precert. PASRR approved. From home alone. [facility] referral for EOS.   Discharge Plan       Row Name 06/30/25 1341       Plan    Plan [facility] accepted pending bed availability. Will require precert. PASRR approved. From home alone. [facility] referral for EOS.    Plan Comments Per RN, patient's nelyon Jeremias spoke to patient's other step children and they were interested in possibly taking patient home with hospice services. Reported that patient did not want to go to rehab and unsure about further cancer treatments. Reported that they did not have preference in specific agency. Patient's PCP is medical director for [facility] so new referral added in basket and sent to liaison Kyra for explanation of services request.                  Lilibeth Chavez RN     Office Phone: [phone]  Office Cell: [phone]

## 2025-06-30 NOTE — PROGRESS NOTES
Hematology/Oncology Inpatient Consultation    Patient name: Jeremias Negro  : 1940  MRN: [id number]  Referring Provider: Dr. Vale MANUEL  Reason for Consultation: Metastatic malignancy, leukocytosis     Chief complaint: Weakness     History of present illness:    Jeremias Negro is a 84 y.o. male with past medical history significant for CAD s/p CABG, HTN, HLD, current smoker who presented to [facility] on 2025 after abnormal blood counts noted by PCP.  He was instructed to come to the hospital after having a white blood cell count of 42,000.  He states he has been feeling unwell for several weeks with generalized weakness, low back pain and left hip pain.  Reports intermittent nausea, decreased urination and constipation along with dizziness that has resulted in multiple falls.  He denies any loss of consciousness.  He does note his left lower leg and ankle have been swelling as well.  Patient reports he is a current smoker at about 1.5 packs/day.     He was treated for Covid-19 infection, exacerbation of likely underlying COPD, and SUZETTE in January at [facility] and had a normal WBC at that time. His weight in January was 133lbs.     In the ER, he was hypotensive with sBP in 80's upon arrival.  Sepsis fluid bolus given along with BC drawn and zosyn initated. BP noted improvement  WBC 48.42 with HGB of 9.9, HCT 32.1. Lactic 3.3. K 3.3 with 20 meq given.  Creatinine 2.03 with GFR 31.9, alk phose 124, albumin 2.9.  CXR was non specific with vague reticulonodular airspace opacities suspicious for infectious/inflammatory process.  Was admitted for further workup and management of leukocytosis, SUZETTE, weakness and possible sepsis.     2025 CT chest abdomen pelvis without contrast  Impression:  Please note this is a motion-degraded, noncontrast exam which makes assessment somewhat suboptimal. Within these confines, there is widely metastatic malignancy throughout the chest, abdomen, and  pelvis as summarized below. Recommend tissue sampling.     Chest:  - Bulky metastatic mediastinal lymphadenopathy.  - Large metastatic soft tissue implant in the right shoulder.  - Small metastatic implant in the epicardial fat.  - Indeterminate right upper lobe pulmonary nodule.  - Bibasilar bronchial wall thickening and mucoid impaction, suggestive of an infectious/inflammatory process. Areas of interlobular septal thickening may reflect mild pulmonary edema. Background of emphysematous and chronic changes of the lungs.     Abdomen/pelvis:  - Large mass replacing the left kidney.  - Innumerable metastatic implants in the peritoneum, bilateral retroperitoneum, and mesentery.  - Numerous metastatic intramuscular and soft tissue implants.  - Small volume pelvic ascites.     6/25/2025 CT head without contrast  Impression:   Atrophy and chronic microvascular ischemic change. No acute intracranial process. Right mastoiditis.      Patient underwent duplex ultrasound of bilateral lower extremities due to his leg swelling.  This showed no DVT.  There was an irregular, heterogeneous, predominantly hypoechoic, nonvascular echolucency and subcutaneous tissue of the left groin.     Renal ultrasound showed left renal mass measuring up to 4.2 x 5.3 by 5.4 cm with resulting mild left-sided hydronephrosis.       06/25/25  Hematology/Oncology was consulted.patient seen today for initial evaluation. He appears to have somewhat limited understanding of his medical issues. He denied having any significant medical issues, however later admitted having increased urinary frequency over last few weeks and some ongoing constipation. Denied any weight loss, changes in appetite, progressive fatigue, abd pain, hematuria, diarrhea Nausea/vomiting etc. He reported being independent in his ADLs,lives by himself and is assisted by his son and daughter in some activities who live close to his home.    6/28/2025: Feels somewhat better. He has been  able to eat some. Seems confused at times.       Subjective:  6/30/25: has some LUQ pain, reported having some nausea. No acute complaints otherwise     He/She  has a past medical history of Coronary artery disease, Hyperlipidemia, and Hypertension.    PCP: Tomer Azevedo MD    History:  Past Medical History:   Diagnosis Date    Coronary artery disease     Hyperlipidemia     Hypertension    , History reviewed. No pertinent surgical history., History reviewed. No pertinent family history.,   Social History     Tobacco Use    Smoking status: Every Day     Current packs/day: 2.00     Average packs/day: 2.0 packs/day for 16.5 years (33.0 ttl pk-yrs)     Types: Cigarettes     Start date: 2009   Vaping Use    Vaping status: Never Used   Substance Use Topics    Alcohol use: No    Drug use: Never   ,   Medications Prior to Admission   Medication Sig Dispense Refill Last Dose/Taking    metoprolol tartrate (LOPRESSOR) 100 MG tablet METOPROLOL TARTRATE 100 MG TABS   6/23/2025 Evening    amLODIPine (NORVASC) 5 MG tablet Daily.   More than a month    citalopram (CeleXA) 40 MG tablet Take 1 tablet by mouth Daily.  12 More than a month    CloNIDine (CATAPRES) 0.2 MG tablet Daily.   More than a month    HYDROcodone-acetaminophen (NORCO)  MG per tablet Take 1 tablet by mouth 4 (Four) Times a Day.  0 More than a month    omeprazole (priLOSEC) 20 MG capsule OMEPRAZOLE 20 MG CPDR   More than a month    terazosin (HYTRIN) 2 MG capsule TERAZOSIN HCL 2 MG CAPS   More than a month   , Scheduled Meds:  [Held by provider] amLODIPine, 5 mg, Oral, Q24H  budesonide, 0.5 mg, Nebulization, BID - RT  [Held by provider] citalopram, 40 mg, Oral, Daily  [Held by provider] cloNIDine, 0.2 mg, Oral, Q24H  [START ON 7/1/2025] enoxaparin sodium, 1 mg/kg, Subcutaneous, Q24H  ipratropium-albuterol, 3 mL, Nebulization, TID  metoprolol tartrate, 25 mg, Oral, Q12H  nicotine, 1 patch, Transdermal, Daily  pantoprazole, 40 mg, Oral, Q AM  polyethylene  glycol, 17 g, Oral, Daily  senna-docusate sodium, 2 tablet, Oral, BID  sodium chloride, 10 mL, Intravenous, Q12H  terazosin, 2 mg, Oral, Nightly    , Continuous Infusions:  Pharmacy to Dose enoxaparin (LOVENOX),     , PRN Meds:    acetaminophen    senna-docusate sodium **AND** polyethylene glycol **AND** bisacodyl **AND** bisacodyl    Calcium Replacement - Follow Nurse / BPA Driven Protocol    HYDROcodone-acetaminophen    ipratropium-albuterol    Magnesium Standard Dose Replacement - Follow Nurse / BPA Driven Protocol    nitroglycerin    ondansetron ODT **OR** ondansetron    Pharmacy to Dose enoxaparin (LOVENOX)    Phosphorus Replacement - Follow Nurse / BPA Driven Protocol    Potassium Replacement - Follow Nurse / BPA Driven Protocol    [COMPLETED] Insert Peripheral IV **AND** sodium chloride    sodium chloride    sodium chloride   Allergies:  Patient has no known allergies.    Subjective     ROS:  Review of Systems   Constitutional:  Positive for activity change, appetite change and fatigue. Negative for chills, diaphoresis, fever and unexpected weight change.   HENT: Negative.  Negative for congestion, dental problem, drooling, ear discharge, ear pain, facial swelling, hearing loss, mouth sores, nosebleeds, postnasal drip, rhinorrhea, sinus pressure, sinus pain, sneezing, sore throat, tinnitus, trouble swallowing and voice change.    Eyes: Negative.  Negative for photophobia, pain, discharge, redness, itching and visual disturbance.   Respiratory: Negative.  Negative for apnea, cough, choking, chest tightness, shortness of breath, wheezing and stridor.    Cardiovascular: Negative.  Negative for chest pain, palpitations and leg swelling.   Gastrointestinal: Negative.  Negative for abdominal distention, abdominal pain, anal bleeding, blood in stool, constipation, diarrhea, nausea, rectal pain and vomiting.   Endocrine: Negative.  Negative for cold intolerance, heat intolerance, polydipsia and polyuria.  "  Genitourinary: Negative.  Negative for decreased urine volume, difficulty urinating, dysuria, flank pain, frequency, genital sores, hematuria and urgency.   Musculoskeletal: Negative.  Negative for arthralgias, back pain, gait problem, joint swelling, myalgias, neck pain and neck stiffness.   Skin: Negative.  Negative for color change, pallor and rash.   Allergic/Immunologic: Negative.    Neurological:  Positive for dizziness. Negative for tremors, seizures, syncope, facial asymmetry, speech difficulty, weakness, light-headedness, numbness and headaches.   Hematological: Negative.  Negative for adenopathy. Does not bruise/bleed easily.   Psychiatric/Behavioral: Negative.  Negative for agitation, behavioral problems, confusion, decreased concentration, hallucinations, self-injury, sleep disturbance and suicidal ideas. The patient is not nervous/anxious.       Objective   Vital Signs:   /60 (BP Location: Left arm, Patient Position: Lying)   Pulse 113   Temp 98.3 °F (36.8 °C) (Axillary)   Resp 17   Ht 177.8 cm (70\")   Wt 68.2 kg (150 lb 5.7 oz)   SpO2 99%   BMI 21.57 kg/m²     Physical Exam: (performed by MD)  Physical Exam  Constitutional:       General: He is not in acute distress.     Appearance: He is normal weight. He is ill-appearing. He is not toxic-appearing or diaphoretic.   HENT:      Head: Normocephalic and atraumatic.      Right Ear: External ear normal.      Left Ear: External ear normal.      Nose: Nose normal.      Mouth/Throat:      Mouth: Mucous membranes are moist.      Pharynx: Oropharynx is clear.   Eyes:      General: No scleral icterus.        Right eye: No discharge.         Left eye: No discharge.      Extraocular Movements: Extraocular movements intact.      Conjunctiva/sclera: Conjunctivae normal.      Pupils: Pupils are equal, round, and reactive to light.   Cardiovascular:      Rate and Rhythm: Normal rate and regular rhythm.      Pulses: Normal pulses.      Heart sounds: " Normal heart sounds. No murmur heard.     No friction rub. No gallop.   Pulmonary:      Effort: Pulmonary effort is normal. No respiratory distress.      Breath sounds: No stridor. No wheezing, rhonchi or rales.      Comments: Breath sounds markedly diminished bilaterally.   Chest:      Chest wall: No tenderness.   Abdominal:      General: Bowel sounds are normal. There is no distension.      Palpations: Abdomen is soft. There is no mass.      Tenderness: There is no abdominal tenderness. There is no right CVA tenderness, left CVA tenderness, guarding or rebound.   Musculoskeletal:         General: No tenderness, deformity or signs of injury. Normal range of motion.      Cervical back: Normal range of motion and neck supple. No rigidity.      Right lower leg: No edema.      Left lower leg: No edema.   Lymphadenopathy:      Cervical: No cervical adenopathy.   Skin:     General: Skin is warm and dry.      Coloration: Skin is not jaundiced.      Findings: No bruising or rash.   Neurological:      General: No focal deficit present.      Mental Status: He is alert and oriented to person, place, and time.      Cranial Nerves: No cranial nerve deficit.   Psychiatric:      Comments: Responsive. In good spirits, at times seems confused.      I Alfonzo Koroma MD performed the physical exam on 6/28/2025 as documented above.     Results Review:  Lab Results (last 48 hours)       Procedure Component Value Units Date/Time    Tissue Pathology Exam [[id number]] Collected: 06/26/25 1537    Specimen: Tissue from Peritoneum Updated: 06/27/25 0749    CBC & Differential [[id number]]  (Abnormal) Collected: 06/27/25 0030    Specimen: Blood Updated: 06/27/25 0124    Narrative:      The following orders were created for panel order CBC & Differential.  Procedure                               Abnormality         Status                     ---------                               -----------         ------                     CBC Auto  Differential[[id number]]        Abnormal            Final result               Scan Slide[[id number]]                                       Final result                 Please view results for these tests on the individual orders.    CBC Auto Differential [[id number]]  (Abnormal) Collected: 06/27/25 0030    Specimen: Blood Updated: 06/27/25 0124     WBC 80.91 10*3/mm3      RBC 3.07 10*6/mm3      Hemoglobin 9.0 g/dL      Hematocrit 29.6 %      MCV 96.4 fL      MCH 29.3 pg      MCHC 30.4 g/dL      RDW 18.1 %      RDW-SD 62.9 fl      MPV 9.5 fL      Platelets 317 10*3/mm3     Narrative:      The previously reported component NRBC is no longer being reported. Previous result was 0.0 /100 WBC (Reference Range: 0.0-0.2 /100 WBC) on 6/27/2025 at 0039 EDT.    Scan Slide [[id number]] Collected: 06/27/25 0030    Specimen: Blood Updated: 06/27/25 0124     Scan Slide --     Comment: See Manual Differential Results       Manual Differential [[id number]]  (Abnormal) Collected: 06/27/25 0030    Specimen: Blood Updated: 06/27/25 0124     Neutrophil % 69.0 %      Lymphocyte % 4.0 %      Bands %  26.0 %      Metamyelocyte % 1.0 %      Neutrophils Absolute 76.86 10*3/mm3      Lymphocytes Absolute 3.24 10*3/mm3      Crenated RBC's Slight/1+     Dacrocytes Slight/1+     Poikilocytes Slight/1+     Polychromasia Slight/1+     WBC Morphology Normal     Platelet Estimate Adequate    Basic Metabolic Panel [[id number]]  (Abnormal) Collected: 06/27/25 0030    Specimen: Blood Updated: 06/27/25 0056     Glucose 127 mg/dL      BUN 33.9 mg/dL      Creatinine 1.98 mg/dL      Sodium 140 mmol/L      Potassium 3.7 mmol/L      Chloride 107 mmol/L      CO2 15.3 mmol/L      Calcium 8.8 mg/dL      BUN/Creatinine Ratio 17.1     Anion Gap 17.7 mmol/L      eGFR 32.9 mL/min/1.73     Narrative:      GFR Categories in Chronic Kidney Disease (CKD)              GFR Category          GFR (mL/min/1.73)    Interpretation  G1                    90 or greater        Normal  or high (1)  G2                    60-89                Mild decrease (1)  G3a                   45-59                Mild to moderate decrease  G3b                   30-44                Moderate to severe decrease  G4                    15-29                Severe decrease  G5                    14 or less           Kidney failure    (1)In the absence of evidence of kidney disease, neither GFR category G1 or G2 fulfill the criteria for CKD.    eGFR calculation 2021 CKD-EPI creatinine equation, which does not include race as a factor    Blood Culture - Blood, Arm, Left [[id number]]  (Normal) Collected: 06/24/25 1720    Specimen: Blood from Arm, Left Updated: 06/26/25 1730     Blood Culture No growth at 2 days    Blood Culture - Blood, Hand, Left [[id number]]  (Normal) Collected: 06/24/25 1720    Specimen: Blood from Hand, Left Updated: 06/26/25 1730     Blood Culture No growth at 2 days    Haptoglobin [[id number]]  (Abnormal) Collected: 06/26/25 0428    Specimen: Blood from Arm, Left Updated: 06/26/25 1033     Haptoglobin 232 mg/dL      Comment: Specimen hemolyzed. Results may be affected.       Uric Acid [[id number]]  (Abnormal) Collected: 06/26/25 0428    Specimen: Blood from Arm, Left Updated: 06/26/25 0612     Uric Acid 8.5 mg/dL     Basic Metabolic Panel [[id number]]  (Abnormal) Collected: 06/26/25 0428    Specimen: Blood from Arm, Left Updated: 06/26/25 0518     Glucose 160 mg/dL      BUN 29.3 mg/dL      Creatinine 1.95 mg/dL      Sodium 140 mmol/L      Potassium 4.1 mmol/L      Chloride 107 mmol/L      CO2 11.8 mmol/L      Calcium 8.6 mg/dL      BUN/Creatinine Ratio 15.0     Anion Gap 21.2 mmol/L      eGFR 33.5 mL/min/1.73     Narrative:      GFR Categories in Chronic Kidney Disease (CKD)              GFR Category          GFR (mL/min/1.73)    Interpretation  G1                    90 or greater        Normal or high (1)  G2                    60-89                Mild decrease (1)  G3a                   45-59                 Mild to moderate decrease  G3b                   30-44                Moderate to severe decrease  G4                    15-29                Severe decrease  G5                    14 or less           Kidney failure    (1)In the absence of evidence of kidney disease, neither GFR category G1 or G2 fulfill the criteria for CKD.    eGFR calculation 2021 CKD-EPI creatinine equation, which does not include race as a factor    Occult Blood, Fecal By Immunoassay - Stool, Per Rectum [[id number]]  (Normal) Collected: 06/26/25 0307    Specimen: Stool from Per Rectum Updated: 06/26/25 0346     Occult Blood, Fecal by Immunoassay Negative    Protime-INR [[id number]]  (Abnormal) Collected: 06/26/25 0148    Specimen: Blood Updated: 06/26/25 0228     Protime 18.0 Seconds      INR 1.49    aPTT [[id number]]  (Normal) Collected: 06/26/25 0148    Specimen: Blood Updated: 06/26/25 0228     PTT 30.1 seconds     CBC & Differential [[id number]]  (Abnormal) Collected: 06/26/25 0148    Specimen: Blood Updated: 06/26/25 0215    Narrative:      The following orders were created for panel order CBC & Differential.  Procedure                               Abnormality         Status                     ---------                               -----------         ------                     CBC Auto Differential[[id number]]        Abnormal            Final result               Scan Slide[[id number]]                                                                    Please view results for these tests on the individual orders.    CBC Auto Differential [[id number]]  (Abnormal) Collected: 06/26/25 0148    Specimen: Blood Updated: 06/26/25 0215     WBC 68.74 10*3/mm3      RBC 3.04 10*6/mm3      Hemoglobin 9.0 g/dL      Hematocrit 29.3 %      MCV 96.4 fL      MCH 29.6 pg      MCHC 30.7 g/dL      RDW 17.8 %      RDW-SD 61.6 fl      MPV 9.8 fL      Platelets 266 10*3/mm3      Neutrophil % 92.7 %      Lymphocyte % 1.8 %      Monocyte % 0.9 %       Eosinophil % 0.0 %      Basophil % 0.3 %      Immature Grans % 4.3 %      Neutrophils, Absolute 63.79 10*3/mm3      Lymphocytes, Absolute 1.21 10*3/mm3      Monocytes, Absolute 0.59 10*3/mm3      Eosinophils, Absolute 0.00 10*3/mm3      Basophils, Absolute 0.19 10*3/mm3      Immature Grans, Absolute 2.96 10*3/mm3      nRBC 0.0 /100 WBC     Reticulocytes [[id number]]  (Abnormal) Collected: 06/26/25 0148    Specimen: Blood Updated: 06/26/25 0208     Reticulocyte % 2.46 %      Reticulocyte Absolute 0.0758 10*6/mm3     Lactate Dehydrogenase [[id number]]  (Abnormal) Collected: 06/25/25 0000    Specimen: Blood from Arm, Right Updated: 06/25/25 1702      U/L      Comment: Specimen hemolyzed.  Results may be affected.       Lactic Acid, Plasma [[id number]]  (Abnormal) Collected: 06/25/25 1429    Specimen: Blood Updated: 06/25/25 1458     Lactate 4.7 mmol/L            Imaging Reviewed:   US Guided Soft Tissue/Muscle Biopsy  Result Date: 6/26/2025  Successful ultrasound-guided core biopsy of left lower quadrant soft tissue mass. Report dictated by: Raul Verde DNP  I have personally reviewed this case and agree with the findings above: Electronically Signed: Angela Eason MD  6/26/2025 4:11 PM EDT  Workstation ID: [id number]    CT Chest Without Contrast Diagnostic  Result Date: 6/25/2025  Impression: Please note this is a motion-degraded, noncontrast exam which makes assessment somewhat suboptimal. Within these confines, there is widely metastatic malignancy throughout the chest, abdomen, and pelvis as summarized below. Recommend tissue sampling. Chest: - Bulky metastatic mediastinal lymphadenopathy. - Large metastatic soft tissue implant in the right shoulder. - Small metastatic implant in the epicardial fat. - Indeterminate right upper lobe pulmonary nodule. - Bibasilar bronchial wall thickening and mucoid impaction, suggestive of an infectious/inflammatory process. Areas of interlobular septal thickening may reflect mild  pulmonary edema. Background of emphysematous and chronic changes of the lungs. Abdomen/pelvis: - Large mass replacing the left kidney. - Innumerable metastatic implants in the peritoneum, bilateral retroperitoneum, and mesentery. - Numerous metastatic intramuscular and soft tissue implants. - Small volume pelvic ascites. Electronically Signed: Rodger Mckeon MD  6/25/2025 8:29 AM EDT  Workstation ID: [id number]    CT Abdomen Pelvis Without Contrast  Result Date: 6/25/2025  Impression: Please note this is a motion-degraded, noncontrast exam which makes assessment somewhat suboptimal. Within these confines, there is widely metastatic malignancy throughout the chest, abdomen, and pelvis as summarized below. Recommend tissue sampling. Chest: - Bulky metastatic mediastinal lymphadenopathy. - Large metastatic soft tissue implant in the right shoulder. - Small metastatic implant in the epicardial fat. - Indeterminate right upper lobe pulmonary nodule. - Bibasilar bronchial wall thickening and mucoid impaction, suggestive of an infectious/inflammatory process. Areas of interlobular septal thickening may reflect mild pulmonary edema. Background of emphysematous and chronic changes of the lungs. Abdomen/pelvis: - Large mass replacing the left kidney. - Innumerable metastatic implants in the peritoneum, bilateral retroperitoneum, and mesentery. - Numerous metastatic intramuscular and soft tissue implants. - Small volume pelvic ascites. Electronically Signed: Rodger Mckeon MD  6/25/2025 8:29 AM EDT  Workstation ID: [id number]    CT Head Without Contrast  Result Date: 6/25/2025  Impression: Atrophy and chronic microvascular ischemic change. No acute intracranial process. Right mastoiditis. Electronically Signed: Mike García MD  6/25/2025 5:54 AM EDT  Workstation ID: [id number]    US Renal Bilateral  Result Date: 6/25/2025  Impression: Partially obstructing left renal collecting system from a left renal mass. Please correlate with CT  abdomen/pelvis. Electronically Signed: Mike García MD  6/25/2025 3:43 AM EDT  Workstation ID: [id number]    XR Chest 1 View  Result Date: 6/24/2025  Impression: Vague reticulonodular airspace opacities suspicious for infectious/inflammatory process. Etiologies may include bronchiolitis, aspiration or developing bronchopneumonia. 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  -Reviewed these findings with patient, explained to him that the overall picture is concerning for metastati RCC. Also discussed the importance of tissue diagnosis to consider treatment options.  - Status post left lower quadrant mass biopsy on 6/26/2025.  Pathology is pending.  - Would consider CNS imaging if confirmed to have renal malignancy.  MRI is likely contraindicated due to presence of metallic implant in chest.  Awaiting the final report of pathology.      Leukocytosis with neutrophilia:  -noted WBC count 48.5k on presentation with Left shift.  Noted uptrending WBC counts since admission. Steroid use is likely contributing.  This is  Likely reactive to malignancy.  Ordered peripheral blood flow cytometry, this is pending.  Low concern for primary hematologic neoplasm   -Patient is being managed for suspected sepsis with IV antibiotics and fluids  Continue antibiotics.      Normocytic anemia  Folate Deficiency:  - Iron studies not consistent with iron deficiency.  Will assess for further nutritional deficiencies and hemolysis.  Continue to monitor blood counts.  - Folate deficiency noted. Recommend daily folic acid  supplement.

## 2025-06-30 NOTE — PROGRESS NOTES
CARDIOLOGY PROGRESS NOTE:    Jeremias Negro  84 y.o.  male  1940  [id number]      Referring Provider: Dr. Collazo    Reason for follow-up: New onset atrial fibrillation, prolonged QTc     Patient Care Team:  Tomer Azevedo MD as PCP - General (Family Medicine)    Subjective .  Patient seen and examined.  Labs reviewed.  Chart reviewed.  Patient reports he does not feel well today secondary to nausea, vomiting, abdominal pain.  He denies chest pain, palpitations.    Objective  Patient lying in bed resting on 3 LNC     Review of Systems   Constitutional: Positive for malaise/fatigue. Negative for chills and fever.   Cardiovascular:  Negative for chest pain, dyspnea on exertion, leg swelling, palpitations and syncope.   Respiratory:  Positive for cough. Negative for shortness of breath.    Gastrointestinal:  Positive for abdominal pain, nausea and vomiting.   Neurological:  Negative for dizziness, light-headedness and weakness.   Psychiatric/Behavioral:  Negative for altered mental status.        Allergies: Patient has no known allergies.    Scheduled Meds:[Held by provider] amLODIPine, 5 mg, Oral, Q24H  budesonide, 0.5 mg, Nebulization, BID - RT  [Held by provider] citalopram, 40 mg, Oral, Daily  [Held by provider] cloNIDine, 0.2 mg, Oral, Q24H  enoxaparin sodium, 1 mg/kg, Subcutaneous, Q24H  ipratropium-albuterol, 3 mL, Nebulization, TID  metoprolol tartrate, 25 mg, Oral, Q12H  nicotine, 1 patch, Transdermal, Daily  pantoprazole, 40 mg, Oral, Q AM  polyethylene glycol, 17 g, Oral, Daily  senna-docusate sodium, 2 tablet, Oral, BID  sodium chloride, 10 mL, Intravenous, Q12H  terazosin, 2 mg, Oral, Nightly      Continuous Infusions:Pharmacy to Dose enoxaparin (LOVENOX),       PRN Meds:.  acetaminophen    senna-docusate sodium **AND** polyethylene glycol **AND** bisacodyl **AND** bisacodyl    Calcium Replacement - Follow Nurse / BPA Driven Protocol    HYDROcodone-acetaminophen    ipratropium-albuterol    Magnesium  "Standard Dose Replacement - Follow Nurse / BPA Driven Protocol    nitroglycerin    ondansetron ODT **OR** ondansetron    Pharmacy to Dose enoxaparin (LOVENOX)    Phosphorus Replacement - Follow Nurse / BPA Driven Protocol    Potassium Replacement - Follow Nurse / BPA Driven Protocol    [COMPLETED] Insert Peripheral IV **AND** sodium chloride    sodium chloride    sodium chloride        VITAL SIGNS  Vitals:    06/30/25 0659 06/30/25 0702 06/30/25 0756 06/30/25 1226   BP:   101/61 109/60   BP Location:   Left arm Left arm   Patient Position:   Lying Lying   Pulse: 109 108 113 106   Resp: 27 24 19 15   Temp:   98 °F (36.7 °C) 98.3 °F (36.8 °C)   TempSrc:   Oral Axillary   SpO2: 99% 99% 97%    Weight:       Height:           Flowsheet Rows      Flowsheet Row First Filed Value   Admission Height 177.8 cm (70\") Documented at 06/24/2025 1604   Admission Weight 59 kg (130 lb) Documented at 06/24/2025 1604             TELEMETRY: Sinus tachycardia with PACs    Physical Exam:  Vitals reviewed.   Constitutional:       Appearance: Not in distress.   Eyes:      Pupils: Pupils are equal, round, and reactive to light.   HENT:    Mouth/Throat:      Pharynx: Oropharynx is clear.   Pulmonary:      Effort: Pulmonary effort is normal.      Breath sounds: Rhonchi present.   Cardiovascular:      Tachycardia present. Occasional ectopic beats. Regular rhythm.      Murmurs: There is a systolic murmur.   Pulses:     Intact distal pulses.   Abdominal:      Palpations: Abdomen is soft.   Musculoskeletal: Normal range of motion.      Cervical back: Neck supple. Skin:     General: Skin is warm and dry.   Neurological:      General: No focal deficit present.      Mental Status: Alert and oriented to person, place and time.          Results Review:   I reviewed the patient's new clinical results.  Lab Results (last 24 hours)       Procedure Component Value Units Date/Time    Basic Metabolic Panel [[id number]]  (Abnormal) Collected: 06/30/25 0131    " Specimen: Blood Updated: 06/30/25 0206     Glucose 104 mg/dL      BUN 33.9 mg/dL      Creatinine 2.09 mg/dL      Sodium 144 mmol/L      Potassium 3.5 mmol/L      Chloride 111 mmol/L      CO2 18.3 mmol/L      Calcium 8.6 mg/dL      BUN/Creatinine Ratio 16.2     Anion Gap 14.7 mmol/L      eGFR 30.8 mL/min/1.73     Narrative:      GFR Categories in Chronic Kidney Disease (CKD)              GFR Category          GFR (mL/min/1.73)    Interpretation  G1                    90 or greater        Normal or high (1)  G2                    60-89                Mild decrease (1)  G3a                   45-59                Mild to moderate decrease  G3b                   30-44                Moderate to severe decrease  G4                    15-29                Severe decrease  G5                    14 or less           Kidney failure    (1)In the absence of evidence of kidney disease, neither GFR category G1 or G2 fulfill the criteria for CKD.    eGFR calculation 2021 CKD-EPI creatinine equation, which does not include race as a factor    CBC & Differential [[id number]]  (Abnormal) Collected: 06/30/25 0131    Specimen: Blood Updated: 06/30/25 0147    Narrative:      The following orders were created for panel order CBC & Differential.  Procedure                               Abnormality         Status                     ---------                               -----------         ------                     CBC Auto Differential[[id number]]        Abnormal            Final result               Scan Slide[[id number]]                                                                    Please view results for these tests on the individual orders.    CBC Auto Differential [[id number]]  (Abnormal) Collected: 06/30/25 0131    Specimen: Blood Updated: 06/30/25 0147     WBC 72.41 10*3/mm3      Comment: Parameter reviewed in the past 30 days on 6/27/2025 .        RBC 2.87 10*6/mm3      Hemoglobin 8.5 g/dL      Hematocrit 27.5 %      MCV 95.8  fL      MCH 29.6 pg      MCHC 30.9 g/dL      RDW 18.6 %      RDW-SD 64.2 fl      MPV 9.5 fL      Platelets 251 10*3/mm3      Neutrophil % 91.0 %      Lymphocyte % 2.9 %      Monocyte % 2.3 %      Eosinophil % 0.2 %      Basophil % 0.3 %      Immature Grans % 3.3 %      Neutrophils, Absolute 65.89 10*3/mm3      Lymphocytes, Absolute 2.07 10*3/mm3      Monocytes, Absolute 1.70 10*3/mm3      Eosinophils, Absolute 0.14 10*3/mm3      Basophils, Absolute 0.19 10*3/mm3      Immature Grans, Absolute 2.42 10*3/mm3      nRBC 0.0 /100 WBC     Blood Culture - Blood, Arm, Left [[id number]]  (Normal) Collected: 06/24/25 1720    Specimen: Blood from Arm, Left Updated: 06/29/25 1731     Blood Culture No growth at 5 days    Blood Culture - Blood, Hand, Left [[id number]]  (Normal) Collected: 06/24/25 1720    Specimen: Blood from Hand, Left Updated: 06/29/25 1731     Blood Culture No growth at 5 days    Basic Metabolic Panel [[id number]]  (Abnormal) Collected: 06/29/25 1306    Specimen: Blood Updated: 06/29/25 1338     Glucose 100 mg/dL      BUN 34.3 mg/dL      Creatinine 2.05 mg/dL      Sodium 140 mmol/L      Potassium 3.9 mmol/L      Comment: Specimen hemolyzed.  Result may be falsely elevated.        Chloride 108 mmol/L      CO2 19.9 mmol/L      Calcium 8.8 mg/dL      BUN/Creatinine Ratio 16.7     Anion Gap 12.1 mmol/L      eGFR 31.6 mL/min/1.73     Narrative:      GFR Categories in Chronic Kidney Disease (CKD)              GFR Category          GFR (mL/min/1.73)    Interpretation  G1                    90 or greater        Normal or high (1)  G2                    60-89                Mild decrease (1)  G3a                   45-59                Mild to moderate decrease  G3b                   30-44                Moderate to severe decrease  G4                    15-29                Severe decrease  G5                    14 or less           Kidney failure    (1)In the absence of evidence of kidney disease, neither GFR category G1  or G2 fulfill the criteria for CKD.    eGFR calculation 2021 CKD-EPI creatinine equation, which does not include race as a factor    CBC & Differential [[id number]]  (Abnormal) Collected: 06/29/25 1306    Specimen: Blood Updated: 06/29/25 1315    Narrative:      The following orders were created for panel order CBC & Differential.  Procedure                               Abnormality         Status                     ---------                               -----------         ------                     CBC Auto Differential[[id number]]        Abnormal            Final result               Scan Slide[[id number]]                                                                    Please view results for these tests on the individual orders.    CBC Auto Differential [[id number]]  (Abnormal) Collected: 06/29/25 1306    Specimen: Blood Updated: 06/29/25 1315     WBC 67.07 10*3/mm3      RBC 3.07 10*6/mm3      Hemoglobin 9.0 g/dL      Hematocrit 29.6 %      MCV 96.4 fL      MCH 29.3 pg      MCHC 30.4 g/dL      RDW 18.6 %      RDW-SD 64.4 fl      MPV 9.4 fL      Platelets 265 10*3/mm3      Neutrophil % 92.9 %      Lymphocyte % 2.2 %      Monocyte % 1.6 %      Eosinophil % 0.3 %      Basophil % 0.2 %      Immature Grans % 2.8 %      Neutrophils, Absolute 62.28 10*3/mm3      Lymphocytes, Absolute 1.49 10*3/mm3      Monocytes, Absolute 1.09 10*3/mm3      Eosinophils, Absolute 0.19 10*3/mm3      Basophils, Absolute 0.16 10*3/mm3      Immature Grans, Absolute 1.86 10*3/mm3      nRBC 0.0 /100 WBC             Imaging Results (Last 24 Hours)       ** No results found for the last 24 hours. **            EKG      I personally viewed and interpreted the patient's EKG/Telemetry data:    ECHOCARDIOGRAM:  Results for orders placed during the hospital encounter of 06/24/25    Adult Transthoracic Echo Complete W/ Cont if Necessary Per Protocol    Interpretation Summary    Left ventricular ejection fraction appears to be 51 - 55%.    The right  ventricular cavity is mildly dilated.    Severe aortic valve stenosis is present.    Estimated right ventricular systolic pressure from tricuspid regurgitation is normal (<35 mmHg).       STRESS MYOVIEW:       CARDIAC CATHETERIZATION:  No results found for this or any previous visit.       OTHER:         Assessment & Plan     Principal Problem:    SUZETTE (acute kidney injury)  Active Problems:    Hypertension    Hyperlipidemia    Coronary artery disease    Hypokalemia    Leukocytosis    Elevated lactic acid level    Anemia    Sepsis, unspecified organism    Weakness    Constipation    Falls frequently    Dizziness    Hard of hearing    Body mass index (BMI) of 19.0 to 19.9 in adult    Cigarette smoker    CKD (chronic kidney disease) stage 3, GFR 30-59 ml/min    Atrial fibrillation    Left hip pain    Severe protein-calorie malnutrition       Atrial fibrillation, New onset  Prolonged Qtc-- improved   Converted to sinus rhythm/tachycardia  Beta-blocker for rate control   Lovenox for anticoagulation  Recommend oral anticoagulation prior to discharge  Discussed with patient risk of stroke versus bleeding  [id number] score 4   Echocardiogram with LVEF 51 to 55%  TSH 1.49  Monitor at discharge to evaluate atrial fibrillation burden as short isolated incident of arrhythmia      Severe aortic stenosis  Echocardiogram with LVEF 51 to 55%, mildly dilated RV  Severe aortic valve stenosis noted   Beta-blocker  Further workup for AS pending prognosis    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  Leukocytosis  Sepsis   WBC 72.41  CT chest and a/p concerning for metastatic disease  Hematology/oncology following   Status post biopsy 6/26, awaiting pathology  IV antibiotics     History of coronary artery disease  Status post CABG  Denies chest pain  Beta-blocker   A1c 5.36  LDL 52     History of hypertension  Hypotensive at admission  Improved status post IVF  Low-dose beta-blocker     SUZETTE on CKD  Creatinine 2.09  Consider  nephrology consult if worsens     COPD  Tobacco abuse  Pulmicort  DuoNeb  Nicotine patch  Counseled complete cessation        Isabel Davila, RAAD  06/30/25  13:03 EDT

## 2025-06-30 NOTE — PLAN OF CARE
Goal Outcome Evaluation:  Plan of Care Reviewed With: patient        Progress: no change  Outcome Evaluation: WBC still elevated. Awaiting biopsy results. Continue to IV abx and monitor

## 2025-06-30 NOTE — SIGNIFICANT NOTE
06/30/25 1421   OTHER   Discipline physical therapist   Rehab Time/Intention   Session Not Performed patient/family declined, not feeling well  (Attempts in AM and PM today but pt with generalized pain and discomfort. Pt given multiple options for therapy activities but he politely refuses.)   Therapy Assessment/Plan (PT)   Criteria for Skilled Interventions Met (PT) other (see comments)  (TBD pending goals of care. Hospice to speak with patient today.)   Recommendation   PT - Next Appointment 07/01/25

## 2025-06-30 NOTE — DISCHARGE PLACEMENT REQUEST
"Martinez Negro (84 y.o. Male)       Date of Birth   1940    Social Security Number       Address   [address] [place] [address]    Home Phone   [phone]    MRN   [id number]       [facility]    Marital Status                               Admission Date   6/24/2025    Admission Type   Emergency    Admitting Provider   Ema Collazo MD    Attending Provider   Ema Collazo MD    Department, Room/Bed   [facility], 2130/1       Discharge Date       Discharge Disposition       Discharge Destination                                 Attending Provider: Ema Collazo MD    Allergies: No Known Allergies    Isolation: None   Infection: None   Code Status: CPR    Ht: 177.8 cm (70\")   Wt: 68.2 kg (150 lb 5.7 oz)    Admission Cmt: None   Principal Problem: SUZETTE (acute kidney injury) [N17.9]                   Active Insurance as of 6/24/2025       Primary Coverage       Payor Plan Insurance Group Employer/Plan Group    ANTHEM MEDICARE REPLACEMENT ANTHEM MEDICARE ADVANTAGE PPO [id number]       Payor Plan Address Payor Plan Phone Number Payor Plan Fax Number Effective Dates    [address] [phone]  1/1/2024 - None Entered    [facility] [id number]         Subscriber Name Subscriber Birth Date Member ID       MARTINEZ NEGRO 1940 [id number]                     Emergency Contacts        (Rel.) Home Phone Work Phone Mobile Phone    Martinez Macdonald (step-son) (Other) -- -- [phone]    Sonya Macdonald (step-son) (Other) -- -- [phone]    Mumtaz Macdonald (step-son) (Other) -- -- [phone]    Portia Hassan (step-daughter) -- -- [phone]            "

## 2025-06-30 NOTE — PHARMACY PATIENT ASSISTANCE
Transitions of Care (test claim):    Drug Rivaroxaban:  20 mg PO daily  Covered/PA Required: Covered without PA  Patient Cost: $114.95  Is the medication eligible for a trial card/copay card? Free trial available from         Patient cost may be due to deductibles associated with his/her health insurance plan. Patient cost may vary over time. The patient is eligible to use a one-time 30-day free trial card, but not eligible to use a monthly discount card. The patient may apply for financial assistance from the drug company or for the Medicare Prescription Payment Plan program.         Please note that when it states medication is eligible for a trial card that this only means that the medication has a free trial available. This does not assess if the patient has already utilized the once in a lifetime free trial.     This test claim coverage is only valid on the date the note is published. Copay/coverage could vary depending on patient coverage at a later date.  Additionally this test claim is for the sole purpose of a price check not a clinical recommendation.     For billing questions please call [facility] Pharmacist at ext: [phone]  For M2B questions please call [facility] at ext: [phone]        Peter Melissa, Jagdeep, BCPS

## 2025-06-30 NOTE — PLAN OF CARE
Goal Outcome Evaluation:  Plan of Care Reviewed With: patient        Progress: no change  Outcome Evaluation: Family requesting hospice referral. Hoping to go home with hospice care.

## 2025-06-30 NOTE — PROGRESS NOTES
LOS: 6 days   Patient Care Team:  Tomer Azevedo MD as PCP - General (Family Medicine)    Subjective     Interval History: Stable overnight    Patient Complaints: Nausea, no other complaints    History taken from: patient    Review of Systems   Constitutional:  Positive for activity change, appetite change and fatigue. Negative for chills, diaphoresis and fever.   HENT:  Negative for congestion and facial swelling.    Eyes:  Negative for visual disturbance.   Respiratory:  Negative for cough, shortness of breath, wheezing and stridor.    Cardiovascular:  Negative for chest pain, palpitations and leg swelling.   Gastrointestinal:  Positive for nausea. Negative for abdominal pain and vomiting.   Endocrine: Negative for polyuria.   Genitourinary:  Negative for dysuria.   Musculoskeletal:  Negative for arthralgias, back pain and gait problem.   Neurological:  Negative for light-headedness and headaches.   Psychiatric/Behavioral:  Positive for confusion.            Objective     Vital Signs  Temp:  [97.6 °F (36.4 °C)-98.3 °F (36.8 °C)] 98.3 °F (36.8 °C)  Heart Rate:  [103-119] 106  Resp:  [15-27] 15  BP: (101-119)/(60-76) 109/60    Physical Exam:     General Appearance:    Alert, cooperative, in no acute distress, chronically ill-appearing, hard of hearing   Head:    Normocephalic, without obvious abnormality, atraumatic   Eyes:            Lids and lashes normal, conjunctivae and sclerae normal, no   icterus, no pallor, corneas clear, PERRLA   Ears:    Ears appear intact with no abnormalities noted   Throat:   No oral lesions, no thrush, oral mucosa moist   Neck:   No adenopathy, supple, trachea midline, no thyromegaly, no   carotid bruit, no JVD   Lungs:     Clear to auscultation,respirations regular, even and                  unlabored    Heart:    Regular rhythm and normal rate, normal S1 and S2, no            murmur, no gallop, no rub, no click   Chest Wall:    No abnormalities observed   Abdomen:     Normal  bowel sounds, no masses, no organomegaly, soft        Non-tender non-distended, no guarding,   Extremities:   Moves all extremities well, no edema, no cyanosis, no             Redness   Pulses:   Pulses palpable and equal bilaterally   Skin:   No bleeding, bruising or rash   Lymph nodes:   No palpable adenopathy   Neurologic:   Cranial nerves 2 - 12 grossly intact, sensation intact, DTR       present and equal bilaterally        Results Review:    Lab Results (last 24 hours)       Procedure Component Value Units Date/Time    Basic Metabolic Panel [[id number]]  (Abnormal) Collected: 06/30/25 0131    Specimen: Blood Updated: 06/30/25 0206     Glucose 104 mg/dL      BUN 33.9 mg/dL      Creatinine 2.09 mg/dL      Sodium 144 mmol/L      Potassium 3.5 mmol/L      Chloride 111 mmol/L      CO2 18.3 mmol/L      Calcium 8.6 mg/dL      BUN/Creatinine Ratio 16.2     Anion Gap 14.7 mmol/L      eGFR 30.8 mL/min/1.73     Narrative:      GFR Categories in Chronic Kidney Disease (CKD)              GFR Category          GFR (mL/min/1.73)    Interpretation  G1                    90 or greater        Normal or high (1)  G2                    60-89                Mild decrease (1)  G3a                   45-59                Mild to moderate decrease  G3b                   30-44                Moderate to severe decrease  G4                    15-29                Severe decrease  G5                    14 or less           Kidney failure    (1)In the absence of evidence of kidney disease, neither GFR category G1 or G2 fulfill the criteria for CKD.    eGFR calculation 2021 CKD-EPI creatinine equation, which does not include race as a factor    CBC & Differential [[id number]]  (Abnormal) Collected: 06/30/25 0131    Specimen: Blood Updated: 06/30/25 0147    Narrative:      The following orders were created for panel order CBC & Differential.  Procedure                               Abnormality         Status                     ---------                                -----------         ------                     CBC Auto Differential[[id number]]        Abnormal            Final result               Scan Slide[[id number]]                                                                    Please view results for these tests on the individual orders.    CBC Auto Differential [[id number]]  (Abnormal) Collected: 06/30/25 0131    Specimen: Blood Updated: 06/30/25 0147     WBC 72.41 10*3/mm3      Comment: Parameter reviewed in the past 30 days on 6/27/2025 .        RBC 2.87 10*6/mm3      Hemoglobin 8.5 g/dL      Hematocrit 27.5 %      MCV 95.8 fL      MCH 29.6 pg      MCHC 30.9 g/dL      RDW 18.6 %      RDW-SD 64.2 fl      MPV 9.5 fL      Platelets 251 10*3/mm3      Neutrophil % 91.0 %      Lymphocyte % 2.9 %      Monocyte % 2.3 %      Eosinophil % 0.2 %      Basophil % 0.3 %      Immature Grans % 3.3 %      Neutrophils, Absolute 65.89 10*3/mm3      Lymphocytes, Absolute 2.07 10*3/mm3      Monocytes, Absolute 1.70 10*3/mm3      Eosinophils, Absolute 0.14 10*3/mm3      Basophils, Absolute 0.19 10*3/mm3      Immature Grans, Absolute 2.42 10*3/mm3      nRBC 0.0 /100 WBC     Blood Culture - Blood, Arm, Left [[id number]]  (Normal) Collected: 06/24/25 1720    Specimen: Blood from Arm, Left Updated: 06/29/25 1731     Blood Culture No growth at 5 days    Blood Culture - Blood, Hand, Left [[id number]]  (Normal) Collected: 06/24/25 1720    Specimen: Blood from Hand, Left Updated: 06/29/25 1731     Blood Culture No growth at 5 days    Basic Metabolic Panel [[id number]]  (Abnormal) Collected: 06/29/25 1306    Specimen: Blood Updated: 06/29/25 1338     Glucose 100 mg/dL      BUN 34.3 mg/dL      Creatinine 2.05 mg/dL      Sodium 140 mmol/L      Potassium 3.9 mmol/L      Comment: Specimen hemolyzed.  Result may be falsely elevated.        Chloride 108 mmol/L      CO2 19.9 mmol/L      Calcium 8.8 mg/dL      BUN/Creatinine Ratio 16.7     Anion Gap 12.1 mmol/L      eGFR 31.6  mL/min/1.73     Narrative:      GFR Categories in Chronic Kidney Disease (CKD)              GFR Category          GFR (mL/min/1.73)    Interpretation  G1                    90 or greater        Normal or high (1)  G2                    60-89                Mild decrease (1)  G3a                   45-59                Mild to moderate decrease  G3b                   30-44                Moderate to severe decrease  G4                    15-29                Severe decrease  G5                    14 or less           Kidney failure    (1)In the absence of evidence of kidney disease, neither GFR category G1 or G2 fulfill the criteria for CKD.    eGFR calculation 2021 CKD-EPI creatinine equation, which does not include race as a factor    CBC & Differential [[id number]]  (Abnormal) Collected: 06/29/25 1306    Specimen: Blood Updated: 06/29/25 1315    Narrative:      The following orders were created for panel order CBC & Differential.  Procedure                               Abnormality         Status                     ---------                               -----------         ------                     CBC Auto Differential[[id number]]        Abnormal            Final result               Scan Slide[[id number]]                                                                    Please view results for these tests on the individual orders.    CBC Auto Differential [[id number]]  (Abnormal) Collected: 06/29/25 1306    Specimen: Blood Updated: 06/29/25 1315     WBC 67.07 10*3/mm3      RBC 3.07 10*6/mm3      Hemoglobin 9.0 g/dL      Hematocrit 29.6 %      MCV 96.4 fL      MCH 29.3 pg      MCHC 30.4 g/dL      RDW 18.6 %      RDW-SD 64.4 fl      MPV 9.4 fL      Platelets 265 10*3/mm3      Neutrophil % 92.9 %      Lymphocyte % 2.2 %      Monocyte % 1.6 %      Eosinophil % 0.3 %      Basophil % 0.2 %      Immature Grans % 2.8 %      Neutrophils, Absolute 62.28 10*3/mm3      Lymphocytes, Absolute 1.49 10*3/mm3      Monocytes,  Absolute 1.09 10*3/mm3      Eosinophils, Absolute 0.19 10*3/mm3      Basophils, Absolute 0.16 10*3/mm3      Immature Grans, Absolute 1.86 10*3/mm3      nRBC 0.0 /100 WBC              Imaging Results (Last 24 Hours)       ** No results found for the last 24 hours. **                 I reviewed the patient's new clinical results.    Medication Review:   Scheduled Meds:[Held by provider] amLODIPine, 5 mg, Oral, Q24H  budesonide, 0.5 mg, Nebulization, BID - RT  [Held by provider] citalopram, 40 mg, Oral, Daily  [Held by provider] cloNIDine, 0.2 mg, Oral, Q24H  enoxaparin sodium, 1 mg/kg, Subcutaneous, Q24H  ipratropium-albuterol, 3 mL, Nebulization, TID  metoprolol tartrate, 25 mg, Oral, Q12H  nicotine, 1 patch, Transdermal, Daily  pantoprazole, 40 mg, Oral, Q AM  polyethylene glycol, 17 g, Oral, Daily  senna-docusate sodium, 2 tablet, Oral, BID  sodium chloride, 10 mL, Intravenous, Q12H  terazosin, 2 mg, Oral, Nightly      Continuous Infusions:Pharmacy to Dose enoxaparin (LOVENOX),       PRN Meds:.  acetaminophen    senna-docusate sodium **AND** polyethylene glycol **AND** bisacodyl **AND** bisacodyl    Calcium Replacement - Follow Nurse / BPA Driven Protocol    HYDROcodone-acetaminophen    ipratropium-albuterol    Magnesium Standard Dose Replacement - Follow Nurse / BPA Driven Protocol    nitroglycerin    ondansetron ODT **OR** ondansetron    Pharmacy to Dose enoxaparin (LOVENOX)    Phosphorus Replacement - Follow Nurse / BPA Driven Protocol    Potassium Replacement - Follow Nurse / BPA Driven Protocol    [COMPLETED] Insert Peripheral IV **AND** sodium chloride    sodium chloride    sodium chloride     Assessment & Plan       SUZETTE (acute kidney injury)    Hypertension    Hyperlipidemia    Coronary artery disease    Hypokalemia    Leukocytosis    Elevated lactic acid level    Anemia    Sepsis, unspecified organism    Weakness    Constipation    Falls frequently    Dizziness    Hard of hearing    Body mass index (BMI) of  19.0 to 19.9 in adult    Cigarette smoker    CKD (chronic kidney disease) stage 3, GFR 30-59 ml/min    Atrial fibrillation    Left hip pain    Severe protein-calorie malnutrition    -Leukocytosis elevated but stable., no longer on steroids; no acute signs or symptoms of infection.  He has completed a course of Zosyn for pneumonia.  Continue to monitor.  Appreciate ID service input.  - Lovenox for DVT prophylaxis  - Pathology for abdominal wall biopsy is pending.  Suspect widely metastatic renal cell carcinoma.  - Metoprolol for tachycardia; now in sinus rhythm; treatment dose Lovenox; consider event monitor at discharge  -Severe aortic stenosis-decision on any intervention pending results of biopsy and clarification of prognosis  - Replacing potassium  - Renal function is stable            CODE Status:    Code status (Patient has no pulse and is not breathing):  CPR (Attempt to Resuscitate)  Medical Interventions (Patient has pulse or is breathing):  Full support  Level of support discussed with:  Patient    Admission status:  I believe this patient meets inpatient status  Expected length of stay:  2 midnights or greater  I discussed the patient's findings and my recommendations with the patient.    Plan for disposition: Skilled nursing    Ema Collazo MD  06/30/25  13:08 EDT

## 2025-06-30 NOTE — PLAN OF CARE
"Assessment: Jeremias Negro presents with ADL impairments affecting function including balance, coordination, endurance / activity tolerance, motor control, motor planning, muscle tone abnormal, pain, range of motion (ROM), and strength. Pt agreeable to be repositioning in bed, max A x2. Noted with edema in bilateral elbows, RN notified, encouraged to complete BUE exercises, however pt in significant amount of pain. Pillows placed under BUEs to assist with edema. Demonstrated functioning below baseline abilities indicate the need for continued skilled intervention while inpatient. Tolerating session today without incident. Will continue to follow and progress as tolerated.      Plan/Recommendations:   Moderate Intensity Therapy recommended post-acute care. This is recommended as therapy feels the patient would require 3-4 days per week and wouldn't tolerate \"3 hour daily\" rehab intensity. SNF would be the preferred choice. If the patient does not agree to SNF, arrange HH or OP depending on home bound status. If patient is medically complex, consider LTACH. TBD pending goals of care, pending Hospice.      "

## 2025-06-30 NOTE — THERAPY TREATMENT NOTE
"Subjective: Pt agreeable to therapeutic plan of care.  Cognition: arousal/alertness: Alert and Attentive, hospice entering room when leaving to speak with pt therefore will follow-up services/pending goals of care.     Objective:     Precautions - High falls    Bed Mobility: Max-A and Assist x 2 scoot HOB/repositioning  Functional Transfers: N/A or Not attempted.  Functional Ambulation: N/A or Not attempted.    Vitals: Tachycardic 107 bpm resting/supine with HOB elevated    Pain: 10 [place] Location: reports significant pain   Interventions for pain: Increased Activity and Therapeutic Presence  Education: Provided education on the importance of mobility in the acute care setting, Verbal/Tactile Cues, and Transfer Training    Assessment: Jeremias Negro presents with ADL impairments affecting function including balance, coordination, endurance / activity tolerance, motor control, motor planning, muscle tone abnormal, pain, range of motion (ROM), and strength. Pt agreeable to be repositioning in bed, max A x2. Noted with edema in bilateral elbows, RN notified, encouraged to complete BUE exercises, however pt in significant amount of pain. Pillows placed under BUEs to assist with edema. Demonstrated functioning below baseline abilities indicate the need for continued skilled intervention while inpatient. Tolerating session today without incident. Will continue to follow and progress as tolerated.     Plan/Recommendations:   Moderate Intensity Therapy recommended post-acute care. This is recommended as therapy feels the patient would require 3-4 days per week and wouldn't tolerate \"3 hour daily\" rehab intensity. SNF would be the preferred choice. If the patient does not agree to SNF, arrange HH or OP depending on home bound status. If patient is medically complex, consider LTACH.    Pt desires Skilled Rehab placement at discharge. Pt cooperative; agreeable to therapeutic recommendations and plan of care.     Modified " [place]: N/A = No pre-op stroke/TIA    Post-Tx Position: Supine with HOB Elevated, Alarms activated, and Call light and personal items within reach  PPE: gloves    Therapy Charges for Today       Code Description Service Date Service Provider Modifiers Qty    84409350599  OT THERAPEUTIC ACT EA 15 MIN 6/30/2025 Ry Johnson OT GO 1           Time Calculation- OT       Row Name 06/30/25 1538             Time Calculation- OT    OT Start Time 1406  -SP      OT Stop Time 1415  -SP      OT Time Calculation (min) 9 min  -SP      Total Timed Code Minutes- OT 9 minute(s)  -SP      OT Received On 06/30/25  -SP      OT - Next Appointment 07/01/25  -SP                User Key  (r) = Recorded By, (t) = Taken By, (c) = Cosigned By      Initials Name Provider Type    SP Ry Johnson, OT Occupational Therapist

## 2025-06-30 NOTE — PHARMACY PATIENT ASSISTANCE
Transitions of Care (test claim):    Drug Apixaban:  5 mg PO BID   Covered/PA Required: Covered without PA  Patient Cost: $116.53  Is the medication eligible for a trial card/copay card? Free trial available from           Patient cost may be due to deductibles associated with his health insurance plan. Patient cost may vary over time. The patient is eligible to use a one-time 30-day free trial card, but not eligible to use a monthly discount card. The patient may apply for financial assistance from the drug company or for the Medicare Prescription Payment Plan program.         Please note that when it states medication is eligible for a trial card that this only means that the medication has a free trial available. This does not assess if the patient has already utilized the once in a lifetime free trial.     This test claim coverage is only valid on the date the note is published. Copay/coverage could vary depending on patient coverage at a later date.  Additionally this test claim is for the sole purpose of a price check not a clinical recommendation.     For billing questions please call [facility] Pharmacist at ext: [phone]  For M2B questions please call [facility] at ext: [phone]      Peter Melissa, PaytonD, BCPS

## 2025-07-01 ENCOUNTER — READMISSION MANAGEMENT (OUTPATIENT)
Dept: CALL CENTER | Facility: HOSPITAL | Age: 85
End: 2025-07-01
Payer: MEDICARE

## 2025-07-01 VITALS
BODY MASS INDEX: 21.08 KG/M2 | DIASTOLIC BLOOD PRESSURE: 62 MMHG | RESPIRATION RATE: 22 BRPM | HEIGHT: 70 IN | TEMPERATURE: 97.8 F | HEART RATE: 98 BPM | OXYGEN SATURATION: 92 % | SYSTOLIC BLOOD PRESSURE: 100 MMHG | WEIGHT: 147.27 LBS

## 2025-07-01 LAB
ANION GAP SERPL CALCULATED.3IONS-SCNC: 12.5 MMOL/L (ref 5–15)
BASOPHILS # BLD AUTO: 0.14 10*3/MM3 (ref 0–0.2)
BASOPHILS NFR BLD AUTO: 0.2 % (ref 0–1.5)
BUN SERPL-MCNC: 32.4 MG/DL (ref 8–23)
BUN/CREAT SERPL: 16.2 (ref 7–25)
CALCIUM SPEC-SCNC: 8.6 MG/DL (ref 8.6–10.5)
CHLORIDE SERPL-SCNC: 108 MMOL/L (ref 98–107)
CO2 SERPL-SCNC: 20.5 MMOL/L (ref 22–29)
CREAT SERPL-MCNC: 2 MG/DL (ref 0.76–1.27)
DEPRECATED RDW RBC AUTO: 65.9 FL (ref 37–54)
EGFRCR SERPLBLD CKD-EPI 2021: 32.5 ML/MIN/1.73
EOSINOPHIL # BLD AUTO: 0.99 10*3/MM3 (ref 0–0.4)
EOSINOPHIL NFR BLD AUTO: 1.6 % (ref 0.3–6.2)
ERYTHROCYTE [DISTWIDTH] IN BLOOD BY AUTOMATED COUNT: 18.6 % (ref 12.3–15.4)
GLUCOSE SERPL-MCNC: 90 MG/DL (ref 65–99)
HCT VFR BLD AUTO: 29.7 % (ref 37.5–51)
HGB BLD-MCNC: 9 G/DL (ref 13–17.7)
IMM GRANULOCYTES # BLD AUTO: 2.27 10*3/MM3 (ref 0–0.05)
IMM GRANULOCYTES NFR BLD AUTO: 3.7 % (ref 0–0.5)
LYMPHOCYTES # BLD AUTO: 2.88 10*3/MM3 (ref 0.7–3.1)
LYMPHOCYTES NFR BLD AUTO: 4.7 % (ref 19.6–45.3)
MCH RBC QN AUTO: 29.6 PG (ref 26.6–33)
MCHC RBC AUTO-ENTMCNC: 30.3 G/DL (ref 31.5–35.7)
MCV RBC AUTO: 97.7 FL (ref 79–97)
MONOCYTES # BLD AUTO: 1.86 10*3/MM3 (ref 0.1–0.9)
MONOCYTES NFR BLD AUTO: 3 % (ref 5–12)
NEUTROPHILS NFR BLD AUTO: 53.2 10*3/MM3 (ref 1.7–7)
NEUTROPHILS NFR BLD AUTO: 86.8 % (ref 42.7–76)
NRBC BLD AUTO-RTO: 0 /100 WBC (ref 0–0.2)
PLATELET # BLD AUTO: 245 10*3/MM3 (ref 140–450)
PMV BLD AUTO: 9.5 FL (ref 6–12)
POTASSIUM SERPL-SCNC: 4.3 MMOL/L (ref 3.5–5.2)
RBC # BLD AUTO: 3.04 10*6/MM3 (ref 4.14–5.8)
SODIUM SERPL-SCNC: 141 MMOL/L (ref 136–145)
WBC NRBC COR # BLD AUTO: 61.34 10*3/MM3 (ref 3.4–10.8)

## 2025-07-01 PROCEDURE — 94799 UNLISTED PULMONARY SVC/PX: CPT

## 2025-07-01 PROCEDURE — 85025 COMPLETE CBC W/AUTO DIFF WBC: CPT

## 2025-07-01 PROCEDURE — 80048 BASIC METABOLIC PNL TOTAL CA: CPT

## 2025-07-01 PROCEDURE — 25010000002 ENOXAPARIN PER 10 MG

## 2025-07-01 PROCEDURE — 94761 N-INVAS EAR/PLS OXIMETRY MLT: CPT

## 2025-07-01 PROCEDURE — 94664 DEMO&/EVAL PT USE INHALER: CPT

## 2025-07-01 PROCEDURE — 99232 SBSQ HOSP IP/OBS MODERATE 35: CPT

## 2025-07-01 RX ORDER — ONDANSETRON 4 MG/1
4 TABLET, ORALLY DISINTEGRATING ORAL EVERY 6 HOURS PRN
Qty: 60 TABLET | Refills: 0 | Status: SHIPPED | OUTPATIENT
Start: 2025-07-01

## 2025-07-01 RX ORDER — IPRATROPIUM BROMIDE AND ALBUTEROL SULFATE 2.5; .5 MG/3ML; MG/3ML
3 SOLUTION RESPIRATORY (INHALATION) EVERY 4 HOURS PRN
Qty: 360 ML | Refills: 1 | Status: SHIPPED | OUTPATIENT
Start: 2025-07-01

## 2025-07-01 RX ORDER — HYDROCODONE BITARTRATE AND ACETAMINOPHEN 10; 325 MG/1; MG/1
1 TABLET ORAL EVERY 6 HOURS PRN
Qty: 40 TABLET | Refills: 0 | Status: SHIPPED | OUTPATIENT
Start: 2025-07-01

## 2025-07-01 RX ORDER — POLYETHYLENE GLYCOL 3350 17 G/17G
17 POWDER, FOR SOLUTION ORAL DAILY
Qty: 510 G | Refills: 1 | Status: SHIPPED | OUTPATIENT
Start: 2025-07-02

## 2025-07-01 RX ORDER — ACETAMINOPHEN 325 MG/1
650 TABLET ORAL EVERY 4 HOURS PRN
Start: 2025-07-01

## 2025-07-01 RX ORDER — NICOTINE 21 MG/24HR
1 PATCH, TRANSDERMAL 24 HOURS TRANSDERMAL DAILY
Qty: 28 PATCH | Refills: 1 | Status: SHIPPED | OUTPATIENT
Start: 2025-07-02

## 2025-07-01 RX ORDER — METOPROLOL TARTRATE 25 MG/1
25 TABLET, FILM COATED ORAL EVERY 12 HOURS SCHEDULED
Qty: 60 TABLET | Refills: 1 | Status: SHIPPED | OUTPATIENT
Start: 2025-07-01

## 2025-07-01 RX ORDER — TERAZOSIN 2 MG/1
2 CAPSULE ORAL NIGHTLY
Qty: 30 CAPSULE | Refills: 1 | Status: SHIPPED | OUTPATIENT
Start: 2025-07-01

## 2025-07-01 RX ADMIN — BUDESONIDE 0.5 MG: 0.5 SUSPENSION RESPIRATORY (INHALATION) at 06:50

## 2025-07-01 RX ADMIN — NICOTINE 1 PATCH: 21 PATCH, EXTENDED RELEASE TRANSDERMAL at 08:55

## 2025-07-01 RX ADMIN — Medication 10 ML: at 09:00

## 2025-07-01 RX ADMIN — SENNOSIDES AND DOCUSATE SODIUM 2 TABLET: 50; 8.6 TABLET ORAL at 08:59

## 2025-07-01 RX ADMIN — POLYETHYLENE GLYCOL 3350 17 G: 17 POWDER, FOR SOLUTION ORAL at 08:59

## 2025-07-01 RX ADMIN — IPRATROPIUM BROMIDE AND ALBUTEROL SULFATE 3 ML: .5; 3 SOLUTION RESPIRATORY (INHALATION) at 06:46

## 2025-07-01 RX ADMIN — HYDROCODONE BITARTRATE AND ACETAMINOPHEN 1 TABLET: 10; 325 TABLET ORAL at 02:44

## 2025-07-01 RX ADMIN — PANTOPRAZOLE SODIUM 40 MG: 40 TABLET, DELAYED RELEASE ORAL at 06:08

## 2025-07-01 RX ADMIN — ENOXAPARIN SODIUM 70 MG: 100 INJECTION SUBCUTANEOUS at 09:00

## 2025-07-01 RX ADMIN — METOPROLOL TARTRATE 25 MG: 25 TABLET, FILM COATED ORAL at 08:59

## 2025-07-01 NOTE — PROGRESS NOTES
CARDIOLOGY PROGRESS NOTE:    Jeremias Negro  84 y.o.  male  1940  [id number]      Referring Provider: Dr. Collazo    Reason for follow-up: New onset atrial fibrillation, prolonged QTc     Patient Care Team:  Tomer Azevedo MD as PCP - General (Family Medicine)    Subjective .  Patient seen and examined.  Labs reviewed.  Chart reviewed. Discussed with RN taking care of patient.  Patient and family planning for hospice discharge today.    Objective  Patient lying in bed resting on 3 LNC     Review of Systems   Constitutional: Positive for malaise/fatigue. Negative for chills and fever.   Cardiovascular:  Negative for chest pain, dyspnea on exertion, leg swelling, palpitations and syncope.   Respiratory:  Positive for cough. Negative for shortness of breath.    Neurological:  Positive for weakness. Negative for dizziness and light-headedness.   Psychiatric/Behavioral:  Negative for altered mental status.        Allergies: Patient has no known allergies.    Scheduled Meds:[Held by provider] amLODIPine, 5 mg, Oral, Q24H  budesonide, 0.5 mg, Nebulization, BID - RT  [Held by provider] citalopram, 40 mg, Oral, Daily  [Held by provider] cloNIDine, 0.2 mg, Oral, Q24H  enoxaparin sodium, 1 mg/kg, Subcutaneous, Q24H  ipratropium-albuterol, 3 mL, Nebulization, TID  metoprolol tartrate, 25 mg, Oral, Q12H  nicotine, 1 patch, Transdermal, Daily  pantoprazole, 40 mg, Oral, Q AM  polyethylene glycol, 17 g, Oral, Daily  senna-docusate sodium, 2 tablet, Oral, BID  sodium chloride, 10 mL, Intravenous, Q12H  terazosin, 2 mg, Oral, Nightly      Continuous Infusions:Pharmacy to Dose enoxaparin (LOVENOX),       PRN Meds:.  acetaminophen    senna-docusate sodium **AND** polyethylene glycol **AND** bisacodyl **AND** bisacodyl    Calcium Replacement - Follow Nurse / BPA Driven Protocol    HYDROcodone-acetaminophen    ipratropium-albuterol    Magnesium Standard Dose Replacement - Follow Nurse / BPA Driven Protocol    nitroglycerin     "ondansetron ODT **OR** ondansetron    Pharmacy to Dose enoxaparin (LOVENOX)    Phosphorus Replacement - Follow Nurse / BPA Driven Protocol    Potassium Replacement - Follow Nurse / BPA Driven Protocol    [COMPLETED] Insert Peripheral IV **AND** sodium chloride    sodium chloride    sodium chloride        VITAL SIGNS  Vitals:    07/01/25 0649 07/01/25 0650 07/01/25 0654 07/01/25 0800   BP:    100/65   BP Location:    Left arm   Patient Position:    Lying   Pulse: 114 112 112 109   Resp: 22 24 23 17   Temp:    97.6 °F (36.4 °C)   TempSrc:    Axillary   SpO2: 98% 100% 99% 91%   Weight:       Height:           Flowsheet Rows      Flowsheet Row First Filed Value   Admission Height 177.8 cm (70\") Documented at 06/24/2025 1604   Admission Weight 59 kg (130 lb) Documented at 06/24/2025 1604             TELEMETRY: Sinus tachycardia with PACs    Physical Exam:  Vitals reviewed.   Constitutional:       Appearance: Not in distress.   Eyes:      Pupils: Pupils are equal, round, and reactive to light.   HENT:    Mouth/Throat:      Pharynx: Oropharynx is clear.   Pulmonary:      Effort: Pulmonary effort is normal.      Breath sounds: Rhonchi present.   Cardiovascular:      Tachycardia present. Occasional ectopic beats. Regular rhythm.      Murmurs: There is a systolic murmur.   Pulses:     Intact distal pulses.   Abdominal:      Palpations: Abdomen is soft.   Musculoskeletal: Normal range of motion.      Cervical back: Neck supple. Skin:     General: Skin is warm and dry.   Neurological:      General: No focal deficit present.      Mental Status: Alert and oriented to person, place and time.          Results Review:   I reviewed the patient's new clinical results.  Lab Results (last 24 hours)       Procedure Component Value Units Date/Time    Basic Metabolic Panel [[id number]]  (Abnormal) Collected: 07/01/25 0243    Specimen: Blood Updated: 07/01/25 0313     Glucose 90 mg/dL      BUN 32.4 mg/dL      Creatinine 2.00 mg/dL      " Sodium 141 mmol/L      Potassium 4.3 mmol/L      Chloride 108 mmol/L      CO2 20.5 mmol/L      Calcium 8.6 mg/dL      BUN/Creatinine Ratio 16.2     Anion Gap 12.5 mmol/L      eGFR 32.5 mL/min/1.73     Narrative:      GFR Categories in Chronic Kidney Disease (CKD)              GFR Category          GFR (mL/min/1.73)    Interpretation  G1                    90 or greater        Normal or high (1)  G2                    60-89                Mild decrease (1)  G3a                   45-59                Mild to moderate decrease  G3b                   30-44                Moderate to severe decrease  G4                    15-29                Severe decrease  G5                    14 or less           Kidney failure    (1)In the absence of evidence of kidney disease, neither GFR category G1 or G2 fulfill the criteria for CKD.    eGFR calculation 2021 CKD-EPI creatinine equation, which does not include race as a factor    CBC & Differential [[id number]]  (Abnormal) Collected: 07/01/25 0243    Specimen: Blood Updated: 07/01/25 0254    Narrative:      The following orders were created for panel order CBC & Differential.  Procedure                               Abnormality         Status                     ---------                               -----------         ------                     CBC Auto Differential[[id number]]        Abnormal            Final result               Scan Slide[[id number]]                                                                    Please view results for these tests on the individual orders.    CBC Auto Differential [[id number]]  (Abnormal) Collected: 07/01/25 0243    Specimen: Blood Updated: 07/01/25 0254     WBC 61.34 10*3/mm3      RBC 3.04 10*6/mm3      Hemoglobin 9.0 g/dL      Hematocrit 29.7 %      MCV 97.7 fL      MCH 29.6 pg      MCHC 30.3 g/dL      RDW 18.6 %      RDW-SD 65.9 fl      MPV 9.5 fL      Platelets 245 10*3/mm3      Neutrophil % 86.8 %      Lymphocyte % 4.7 %      Monocyte  % 3.0 %      Eosinophil % 1.6 %      Basophil % 0.2 %      Immature Grans % 3.7 %      Neutrophils, Absolute 53.20 10*3/mm3      Lymphocytes, Absolute 2.88 10*3/mm3      Monocytes, Absolute 1.86 10*3/mm3      Eosinophils, Absolute 0.99 10*3/mm3      Basophils, Absolute 0.14 10*3/mm3      Immature Grans, Absolute 2.27 10*3/mm3      nRBC 0.0 /100 WBC     Uric Acid [[id number]]  (Abnormal) Collected: 06/30/25 0131    Specimen: Blood Updated: 06/30/25 1635     Uric Acid 9.8 mg/dL             Imaging Results (Last 24 Hours)       ** No results found for the last 24 hours. **            EKG      I personally viewed and interpreted the patient's EKG/Telemetry data:    ECHOCARDIOGRAM:  Results for orders placed during the hospital encounter of 06/24/25    Adult Transthoracic Echo Complete W/ Cont if Necessary Per Protocol 06/25/2025  4:54 PM    Interpretation Summary    Left ventricular ejection fraction appears to be 51 - 55%.    The right ventricular cavity is mildly dilated.    Severe aortic valve stenosis is present.    Estimated right ventricular systolic pressure from tricuspid regurgitation is normal (<35 mmHg).       STRESS MYOVIEW:       CARDIAC CATHETERIZATION:  No results found for this or any previous visit.       OTHER:         Assessment & Plan     Principal Problem:    SUZETTE (acute kidney injury)  Active Problems:    Hypertension    Hyperlipidemia    Coronary artery disease    Hypokalemia    Leukocytosis    Elevated lactic acid level    Anemia    Sepsis, unspecified organism    Weakness    Constipation    Falls frequently    Dizziness    Hard of hearing    Body mass index (BMI) of 19.0 to 19.9 in adult    Cigarette smoker    CKD (chronic kidney disease) stage 3, GFR 30-59 ml/min    Atrial fibrillation    Left hip pain    Severe protein-calorie malnutrition       Atrial fibrillation, New onset  Prolonged Qtc-- improved   Converted to sinus rhythm/tachycardia  Echocardiogram with LVEF 51 to 55%  TSH  1.49  Beta-blocker for rate control   Lovenox for anticoagulation  Discussed with patient risk of stroke versus bleeding  [id number] score 4   Will discontinue monitor study and no plans for anticoagulation at this time due to discharge to hospice     Severe aortic stenosis  Echocardiogram with LVEF 51 to 55%, mildly dilated RV  Severe aortic valve stenosis noted   Beta-blocker  No further workup at this time due to hospice    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  Leukocytosis  Sepsis   WBC 61.34  CT chest and a/p concerning for metastatic disease  Hematology/oncology following   Status post biopsy 6/26, awaiting pathology  IV antibiotics     History of coronary artery disease  Status post CABG  Denies chest pain  Beta-blocker   A1c 5.36  LDL 52     History of hypertension  Hypotensive at admission  Improved status post IVF  Low-dose beta-blocker     SUZETTE on CKD  Creatinine 2.09  Consider nephrology consult if worsens     COPD  Tobacco abuse  Pulmicort  DuoNeb  Nicotine patch  Counseled complete cessation    Patient is planning home with [facility] today.  Cardiology will see patient as needed.    Isabel Davila, RAAD  07/01/25  11:41 EDT

## 2025-07-01 NOTE — NURSING NOTE
Patient reports that his family is coming in the morning to take him home. [facility] talked with patient today but unsure of the outcome of the conversation.   
Labs/EKG/Imaging Studies/Medications

## 2025-07-01 NOTE — OUTREACH NOTE
Prep Survey      Flowsheet Row Responses   [facility] discharged [facility]? [facility]   Is LACE score < 7 ? No   Eligibility Not Eligible   What are the reasons patient is not eligible? Hospice/Pallative Care   Does the patient have one of the following disease processes/diagnoses(primary or secondary)? Other   Prep survey completed? Yes            CHARLOTTE ZAMORANO - Registered Nurse

## 2025-07-01 NOTE — DISCHARGE SUMMARY
Date of Discharge:  7/1/2025    Discharge Diagnosis:   **SUZETTE (acute kidney injury) [N17.9]   Severe protein-calorie malnutrition [E43]   Sepsis, unspecified organism [A41.9]   Weakness [R53.1]   Constipation [K59.00]   Falls frequently [R29.6]   Dizziness [R42]   Hard of hearing [H91.90]   Body mass index (BMI) of 19.0 to 19.9 in adult [Z68.1]   Cigarette smoker [F17.210]   CKD (chronic kidney disease) stage 3, GFR 30-59 ml/min [N18.30]   Atrial fibrillation [I48.91]   Left hip pain [M25.552]   Hypokalemia [E87.6]   Leukocytosis [D72.829]   Elevated lactic acid level [R79.89]   Anemia [D64.9]   Hypertension [I10]   Coronary artery disease [I25.10]   Hyperlipidemia [E78.5]       Presenting Problem/History of Present Illness  Active Hospital Problems    Diagnosis  POA    **SUZETTE (acute kidney injury) [N17.9]  Yes    Severe protein-calorie malnutrition [E43]  Yes    Sepsis, unspecified organism [A41.9]  Yes    Weakness [R53.1]  Yes    Constipation [K59.00]  Yes    Falls frequently [R29.6]  Not Applicable    Dizziness [R42]  Yes    Hard of hearing [H91.90]  Yes    Body mass index (BMI) of 19.0 to 19.9 in adult [Z68.1]  Not Applicable    Cigarette smoker [F17.210]  Yes    CKD (chronic kidney disease) stage 3, GFR 30-59 ml/min [N18.30]  Yes    Atrial fibrillation [I48.91]  Yes    Left hip pain [M25.552]  Yes    Hypokalemia [E87.6]  Yes    Leukocytosis [D72.829]  Yes    Elevated lactic acid level [R79.89]  Yes    Anemia [D64.9]  Yes    Hypertension [I10]  Yes    Coronary artery disease [I25.10]  Yes    Hyperlipidemia [E78.5]  Yes      Resolved Hospital Problems   No resolved problems to display.          Hospital Course  Patient is a 84 y.o. male with multiple medical problems listed above who presented from home after outpatient labs were noted to have a white blood cell count of 42,000.  He has underlying COPD and had felt unwell for several weeks.  He was dizzy, constipated, nauseous and had multiple falls at home.   Imaging showed a large renal mass.  He has multiple metastatic lesions including soft tissue lesions.  One of the soft tissue lesions from his abdomen was biopsied and pathology is pending at the time of discharge.  Clinically and radiographically the picture is consistent with widely metastatic renal cell carcinoma.  Patient has very poor functional status and very poor prognosis.  He is being discharged home to the care of family with hospice care.  He declines any attempts at treatment of presumed renal cell carcinoma.    Procedures Performed         Consults:   Consults       Date and Time Order Name Status Description    6/25/2025  9:26 AM Hematology & Oncology Inpatient Consult Completed     6/24/2025 11:43 PM Inpatient Cardiology Consult Completed     6/24/2025  6:43 PM Family Medicine Consult              Pertinent Test Results:    Lab Results (most recent)       Procedure Component Value Units Date/Time    Basic Metabolic Panel [[id number]]  (Abnormal) Collected: 07/01/25 0243    Specimen: Blood Updated: 07/01/25 0313     Glucose 90 mg/dL      BUN 32.4 mg/dL      Creatinine 2.00 mg/dL      Sodium 141 mmol/L      Potassium 4.3 mmol/L      Chloride 108 mmol/L      CO2 20.5 mmol/L      Calcium 8.6 mg/dL      BUN/Creatinine Ratio 16.2     Anion Gap 12.5 mmol/L      eGFR 32.5 mL/min/1.73     Narrative:      GFR Categories in Chronic Kidney Disease (CKD)              GFR Category          GFR (mL/min/1.73)    Interpretation  G1                    90 or greater        Normal or high (1)  G2                    60-89                Mild decrease (1)  G3a                   45-59                Mild to moderate decrease  G3b                   30-44                Moderate to severe decrease  G4                    15-29                Severe decrease  G5                    14 or less           Kidney failure    (1)In the absence of evidence of kidney disease, neither GFR category G1 or G2 fulfill the criteria for  CKD.    eGFR calculation 2021 CKD-EPI creatinine equation, which does not include race as a factor    CBC & Differential [[id number]]  (Abnormal) Collected: 07/01/25 0243    Specimen: Blood Updated: 07/01/25 0254    Narrative:      The following orders were created for panel order CBC & Differential.  Procedure                               Abnormality         Status                     ---------                               -----------         ------                     CBC Auto Differential[[id number]]        Abnormal            Final result               Scan Slide[[id number]]                                                                    Please view results for these tests on the individual orders.    CBC Auto Differential [[id number]]  (Abnormal) Collected: 07/01/25 0243    Specimen: Blood Updated: 07/01/25 0254     WBC 61.34 10*3/mm3      RBC 3.04 10*6/mm3      Hemoglobin 9.0 g/dL      Hematocrit 29.7 %      MCV 97.7 fL      MCH 29.6 pg      MCHC 30.3 g/dL      RDW 18.6 %      RDW-SD 65.9 fl      MPV 9.5 fL      Platelets 245 10*3/mm3      Neutrophil % 86.8 %      Lymphocyte % 4.7 %      Monocyte % 3.0 %      Eosinophil % 1.6 %      Basophil % 0.2 %      Immature Grans % 3.7 %      Neutrophils, Absolute 53.20 10*3/mm3      Lymphocytes, Absolute 2.88 10*3/mm3      Monocytes, Absolute 1.86 10*3/mm3      Eosinophils, Absolute 0.99 10*3/mm3      Basophils, Absolute 0.14 10*3/mm3      Immature Grans, Absolute 2.27 10*3/mm3      nRBC 0.0 /100 WBC     Uric Acid [[id number]]  (Abnormal) Collected: 06/30/25 0131    Specimen: Blood Updated: 06/30/25 1635     Uric Acid 9.8 mg/dL     Basic Metabolic Panel [[id number]]  (Abnormal) Collected: 06/30/25 0131    Specimen: Blood Updated: 06/30/25 0206     Glucose 104 mg/dL      BUN 33.9 mg/dL      Creatinine 2.09 mg/dL      Sodium 144 mmol/L      Potassium 3.5 mmol/L      Chloride 111 mmol/L      CO2 18.3 mmol/L      Calcium 8.6 mg/dL      BUN/Creatinine Ratio 16.2     Anion Gap  14.7 mmol/L      eGFR 30.8 mL/min/1.73     Narrative:      GFR Categories in Chronic Kidney Disease (CKD)              GFR Category          GFR (mL/min/1.73)    Interpretation  G1                    90 or greater        Normal or high (1)  G2                    60-89                Mild decrease (1)  G3a                   45-59                Mild to moderate decrease  G3b                   30-44                Moderate to severe decrease  G4                    15-29                Severe decrease  G5                    14 or less           Kidney failure    (1)In the absence of evidence of kidney disease, neither GFR category G1 or G2 fulfill the criteria for CKD.    eGFR calculation 2021 CKD-EPI creatinine equation, which does not include race as a factor    CBC & Differential [[id number]]  (Abnormal) Collected: 06/30/25 0131    Specimen: Blood Updated: 06/30/25 0147    Narrative:      The following orders were created for panel order CBC & Differential.  Procedure                               Abnormality         Status                     ---------                               -----------         ------                     CBC Auto Differential[[id number]]        Abnormal            Final result               Scan Slide[[id number]]                                                                    Please view results for these tests on the individual orders.    CBC Auto Differential [[id number]]  (Abnormal) Collected: 06/30/25 0131    Specimen: Blood Updated: 06/30/25 0147     WBC 72.41 10*3/mm3      Comment: Parameter reviewed in the past 30 days on 6/27/2025 .        RBC 2.87 10*6/mm3      Hemoglobin 8.5 g/dL      Hematocrit 27.5 %      MCV 95.8 fL      MCH 29.6 pg      MCHC 30.9 g/dL      RDW 18.6 %      RDW-SD 64.2 fl      MPV 9.5 fL      Platelets 251 10*3/mm3      Neutrophil % 91.0 %      Lymphocyte % 2.9 %      Monocyte % 2.3 %      Eosinophil % 0.2 %      Basophil % 0.3 %      Immature Grans % 3.3 %       Neutrophils, Absolute 65.89 10*3/mm3      Lymphocytes, Absolute 2.07 10*3/mm3      Monocytes, Absolute 1.70 10*3/mm3      Eosinophils, Absolute 0.14 10*3/mm3      Basophils, Absolute 0.19 10*3/mm3      Immature Grans, Absolute 2.42 10*3/mm3      nRBC 0.0 /100 WBC     Blood Culture - Blood, Arm, Left [[id number]]  (Normal) Collected: 06/24/25 1720    Specimen: Blood from Arm, Left Updated: 06/29/25 1731     Blood Culture No growth at 5 days    Blood Culture - Blood, Hand, Left [[id number]]  (Normal) Collected: 06/24/25 1720    Specimen: Blood from Hand, Left Updated: 06/29/25 1731     Blood Culture No growth at 5 days    Flow Cytometry ([facility]) [[id number]] Collected: 06/28/25 1157    Specimen: Blood from Arm, Right Updated: 06/28/25 1200    Tissue Pathology Exam [[id number]] Collected: 06/26/25 1537    Specimen: Tissue from Peritoneum Updated: 06/27/25 0749    Scan Slide [[id number]] Collected: 06/27/25 0030    Specimen: Blood Updated: 06/27/25 0124     Scan Slide --     Comment: See Manual Differential Results       Manual Differential [[id number]]  (Abnormal) Collected: 06/27/25 0030    Specimen: Blood Updated: 06/27/25 0124     Neutrophil % 69.0 %      Lymphocyte % 4.0 %      Bands %  26.0 %      Metamyelocyte % 1.0 %      Neutrophils Absolute 76.86 10*3/mm3      Lymphocytes Absolute 3.24 10*3/mm3      Crenated RBC's Slight/1+     Dacrocytes Slight/1+     Poikilocytes Slight/1+     Polychromasia Slight/1+     WBC Morphology Normal     Platelet Estimate Adequate    Haptoglobin [[id number]]  (Abnormal) Collected: 06/26/25 0428    Specimen: Blood from Arm, Left Updated: 06/26/25 1033     Haptoglobin 232 mg/dL      Comment: Specimen hemolyzed. Results may be affected.       Uric Acid [[id number]]  (Abnormal) Collected: 06/26/25 0428    Specimen: Blood from Arm, Left Updated: 06/26/25 0612     Uric Acid 8.5 mg/dL     Occult Blood, Fecal By Immunoassay - Stool, Per Rectum [[id number]]  (Normal) Collected: 06/26/25  0307    Specimen: Stool from Per Rectum Updated: 06/26/25 0346     Occult Blood, Fecal by Immunoassay Negative    Protime-INR [[id number]]  (Abnormal) Collected: 06/26/25 0148    Specimen: Blood Updated: 06/26/25 0228     Protime 18.0 Seconds      INR 1.49    aPTT [[id number]]  (Normal) Collected: 06/26/25 0148    Specimen: Blood Updated: 06/26/25 0228     PTT 30.1 seconds     Reticulocytes [[id number]]  (Abnormal) Collected: 06/26/25 0148    Specimen: Blood Updated: 06/26/25 0208     Reticulocyte % 2.46 %      Reticulocyte Absolute 0.0758 10*6/mm3     Lactate Dehydrogenase [[id number]]  (Abnormal) Collected: 06/25/25 0000    Specimen: Blood from Arm, Right Updated: 06/25/25 1702      U/L      Comment: Specimen hemolyzed.  Results may be affected.       Lactic Acid, Plasma [[id number]]  (Abnormal) Collected: 06/25/25 1429    Specimen: Blood Updated: 06/25/25 1458     Lactate 4.7 mmol/L     Lipid Panel [[id number]]  (Abnormal) Collected: 06/25/25 0000    Specimen: Blood from Arm, Right Updated: 06/25/25 1337     Total Cholesterol 105 mg/dL      Triglycerides 141 mg/dL      HDL Cholesterol 28 mg/dL      LDL Cholesterol  52 mg/dL      VLDL Cholesterol 25 mg/dL      LDL/HDL Ratio 1.74    Narrative:      Cholesterol Reference Ranges  (U.S. Department of Health and Human Services ATP III Classifications)    Desirable          <200 mg/dL  Borderline High    200-239 mg/dL  High Risk          >240 mg/dL      Triglyceride Reference Ranges  (U.S. Department of Health and Human Services ATP III Classifications)    Normal           <150 mg/dL  Borderline High  150-199 mg/dL  High             200-499 mg/dL  Very High        >500 mg/dL    HDL Reference Ranges  (U.S. Department of Health and Human Services ATP III Classifications)    Low     <40 mg/dl (major risk factor for CHD)  High    >60 mg/dl ('negative' risk factor for CHD)        LDL Reference Ranges  (U.S. Department of Health and Human Services ATP III  "Classifications)    Optimal          <100 mg/dL  Near Optimal     100-129 mg/dL  Borderline High  130-159 mg/dL  High             160-189 mg/dL  Very High        >189 mg/dL    LDL is calculated using the NIH LDL-C calculation.      Hemoglobin A1c [[id number]]  (Normal) Collected: 06/25/25 0000    Specimen: Blood from Arm, Right Updated: 06/25/25 1330     Hemoglobin A1C 5.36 %     Narrative:      Hemoglobin A1C Ranges:    Increased Risk for Diabetes  5.7% to 6.4%  Diabetes                     >= 6.5%  Diabetic Goal                < 7.0%    Folate [[id number]]  (Abnormal) Collected: 06/24/25 1720    Specimen: Blood Updated: 06/25/25 1100     Folate 3.28 ng/mL     Narrative:      Results may be falsely increased if patient taking Biotin.      Vitamin B12 [[id number]]  (Normal) Collected: 06/24/25 1720    Specimen: Blood Updated: 06/25/25 1100     Vitamin B-12 618 pg/mL     Narrative:      Results may be falsely increased if patient taking Biotin.      Procalcitonin [[id number]]  (Abnormal) Collected: 06/25/25 0000    Specimen: Blood from Arm, Right Updated: 06/25/25 0849     Procalcitonin 0.30 ng/mL     Narrative:      As a Marker for Sepsis (Non-Neonates):    1. <0.5 ng/mL represents a low risk of severe sepsis and/or septic shock.  2. >2 ng/mL represents a high risk of severe sepsis and/or septic shock.    As a Marker for Lower Respiratory Tract Infections that require antibiotic therapy:    PCT on Admission    Antibiotic Therapy       6-12 Hrs later    >0.5                Strongly Recommended  >0.25 - <0.5        Recommended   0.1 - 0.25          Discouraged              Remeasure/reassess PCT  <0.1                Strongly Discouraged     Remeasure/reassess PCT    As 28 day mortality risk marker: \"Change in Procalcitonin Result\" (>80% or <=80%) if Day 0 (or Day 1) and Day 4 values are available. Refer to http://www.[facility]-pct-calculator.com    Change in PCT <=80%  A decrease of PCT levels below or equal to 80% defines a " positive change in PCT test result representing a higher risk for 28-day all-cause mortality of patients diagnosed with severe sepsis for septic shock.    Change in PCT >80%  A decrease of PCT levels of more than 80% defines a negative change in PCT result representing a lower risk for 28-day all-cause mortality of patients diagnosed with severe sepsis or septic shock.       STAT Lactic Acid, Reflex [[id number]]  (Abnormal) Collected: 06/25/25 0342    Specimen: Blood from Arm, Left Updated: 06/25/25 0413     Lactate 4.0 mmol/L     C-reactive Protein [[id number]]  (Abnormal) Collected: 06/25/25 0000    Specimen: Blood from Arm, Right Updated: 06/25/25 0400     C-Reactive Protein 9.65 mg/dL     Legionella Antigen, Urine - Urine, Urine, Clean Catch [[id number]]  (Normal) Collected: 06/25/25 0336    Specimen: Urine, Clean Catch Updated: 06/25/25 0357     LEGIONELLA ANTIGEN, URINE Negative    S. Pneumo Ag Urine or CSF - Urine, Urine, Clean Catch [[id number]]  (Normal) Collected: 06/25/25 0336    Specimen: Urine, Clean Catch Updated: 06/25/25 0357     Strep Pneumo Ag Negative    Urinalysis, Microscopic Only - Urine, Clean Catch [[id number]]  (Abnormal) Collected: 06/25/25 0336    Specimen: Urine, Clean Catch Updated: 06/25/25 0349     RBC, UA 3-5 /HPF      WBC, UA 0-2 /HPF      Comment: Urine culture not indicated.        Bacteria, UA None Seen /HPF      Squamous Epithelial Cells, UA 0-2 /HPF      Hyaline Casts, UA 0-2 /LPF      Methodology Automated Microscopy    Urinalysis With Culture If Indicated - Urine, Clean Catch [[id number]]  (Abnormal) Collected: 06/25/25 0336    Specimen: Urine, Clean Catch Updated: 06/25/25 0345     Color, UA Yellow     Appearance, UA Clear     pH, UA 5.5     Specific Gravity, UA 1.023     Glucose, UA Negative     Ketones, UA Trace     Bilirubin, UA Negative     Blood, UA Negative     Protein,  mg/dL (2+)     Leuk Esterase, UA Negative     Nitrite, UA Negative     Urobilinogen, UA 2.0 E.U./dL  "   Narrative:      In absence of clinical symptoms, the presence of pyuria, bacteria, and/or nitrites on the urinalysis result does not correlate with infection.    MRSA Screen, PCR (Inpatient) - Swab, Nares [[id number]]  (Normal) Collected: 06/25/25 0146    Specimen: Swab from Nares Updated: 06/25/25 0310     MRSA PCR No MRSA Detected    Narrative:      The negative predictive value of this diagnostic test is high and should only be used to consider de-escalating anti-MRSA therapy. A positive result may indicate colonization with MRSA and must be correlated clinically.    Sedimentation Rate [[id number]]  (Abnormal) Collected: 06/25/25 0000    Specimen: Blood from Arm, Right Updated: 06/25/25 0251     Sed Rate 31 mm/hr     STAT Lactic Acid, Reflex [[id number]]  (Abnormal) Collected: 06/25/25 0000    Specimen: Blood from Arm, Right Updated: 06/25/25 0044     Lactate 4.9 mmol/L     Procalcitonin [[id number]]  (Abnormal) Collected: 06/24/25 2213    Specimen: Blood Updated: 06/24/25 2248     Procalcitonin 0.28 ng/mL     Narrative:      As a Marker for Sepsis (Non-Neonates):    1. <0.5 ng/mL represents a low risk of severe sepsis and/or septic shock.  2. >2 ng/mL represents a high risk of severe sepsis and/or septic shock.    As a Marker for Lower Respiratory Tract Infections that require antibiotic therapy:    PCT on Admission    Antibiotic Therapy       6-12 Hrs later    >0.5                Strongly Recommended  >0.25 - <0.5        Recommended   0.1 - 0.25          Discouraged              Remeasure/reassess PCT  <0.1                Strongly Discouraged     Remeasure/reassess PCT    As 28 day mortality risk marker: \"Change in Procalcitonin Result\" (>80% or <=80%) if Day 0 (or Day 1) and Day 4 values are available. Refer to http://www.[email or web address]-pct-calculator.com    Change in PCT <=80%  A decrease of PCT levels below or equal to 80% defines a positive change in PCT test result representing a higher risk for 28-day all-cause " mortality of patients diagnosed with severe sepsis for septic shock.    Change in PCT >80%  A decrease of PCT levels of more than 80% defines a negative change in PCT result representing a lower risk for 28-day all-cause mortality of patients diagnosed with severe sepsis or septic shock.       Iron Profile w/o Ferritin [[id number]]  (Abnormal) Collected: 06/24/25 1720    Specimen: Blood Updated: 06/24/25 2158     Iron 38 mcg/dL      Iron Saturation (TSAT) 19 %      Transferrin 137 mg/dL      TIBC 204 mcg/dL     Ferritin [[id number]]  (Abnormal) Collected: 06/24/25 1720    Specimen: Blood Updated: 06/24/25 2158     Ferritin 667.00 ng/mL     Narrative:      Results may be falsely decreased if patient taking Biotin.      Scan Slide [[id number]] Collected: 06/24/25 1720    Specimen: Blood Updated: 06/24/25 1801     Scan Slide --     Comment: See Manual Differential Results       Manual Differential [[id number]]  (Abnormal) Collected: 06/24/25 1720    Specimen: Blood Updated: 06/24/25 1801     Neutrophil % 92.0 %      Lymphocyte % 4.0 %      Monocyte % 2.0 %      Eosinophil % 2.0 %      Neutrophils Absolute 44.55 10*3/mm3      Lymphocytes Absolute 1.94 10*3/mm3      Monocytes Absolute 0.97 10*3/mm3      Eosinophils Absolute 0.97 10*3/mm3      RBC Morphology Normal     WBC Morphology Normal     Platelet Estimate Adequate    Comprehensive Metabolic Panel [[id number]]  (Abnormal) Collected: 06/24/25 1720    Specimen: Blood Updated: 06/24/25 1757     Glucose 108 mg/dL      BUN 28.8 mg/dL      Creatinine 2.03 mg/dL      Sodium 140 mmol/L      Potassium 3.3 mmol/L      Chloride 105 mmol/L      CO2 20.0 mmol/L      Calcium 9.0 mg/dL      Total Protein 6.3 g/dL      Albumin 2.9 g/dL      ALT (SGPT) 6 U/L      AST (SGOT) 23 U/L      Alkaline Phosphatase 124 U/L      Total Bilirubin 0.7 mg/dL      Globulin 3.4 gm/dL      A/G Ratio 0.9 g/dL      BUN/Creatinine Ratio 14.2     Anion Gap 15.0 mmol/L      eGFR 31.9 mL/min/1.73      Narrative:      GFR Categories in Chronic Kidney Disease (CKD)              GFR Category          GFR (mL/min/1.73)    Interpretation  G1                    90 or greater        Normal or high (1)  G2                    60-89                Mild decrease (1)  G3a                   45-59                Mild to moderate decrease  G3b                   30-44                Moderate to severe decrease  G4                    15-29                Severe decrease  G5                    14 or less           Kidney failure    (1)In the absence of evidence of kidney disease, neither GFR category G1 or G2 fulfill the criteria for CKD.    eGFR calculation 2021 CKD-EPI creatinine equation, which does not include race as a factor    Magnesium [[id number]]  (Normal) Collected: 06/24/25 1720    Specimen: Blood Updated: 06/24/25 1757     Magnesium 2.3 mg/dL     CK [[id number]]  (Normal) Collected: 06/24/25 1720    Specimen: Blood Updated: 06/24/25 1757     Creatine Kinase 46 U/L     TSH Rfx On Abnormal To Free T4 [[id number]]  (Normal) Collected: 06/24/25 1720    Specimen: Blood Updated: 06/24/25 1757     TSH 1.490 uIU/mL     Extra Tubes [[id number]] Collected: 06/24/25 1720    Specimen: Blood Updated: 06/24/25 1730    Narrative:      The following orders were created for panel order Extra Tubes.  Procedure                               Abnormality         Status                     ---------                               -----------         ------                     Gold Top - SST[[id number]]                                   Final result               Light Blue Top[[id number]]                                   Final result                 Please view results for these tests on the individual orders.    Gold Top - SST [[id number]] Collected: 06/24/25 1720    Specimen: Blood Updated: 06/24/25 1730     Extra Tube Hold for add-ons.     Comment: Auto resulted.       Light Blue Top [[id number]] Collected: 06/24/25 1720    Specimen: Blood  Updated: 06/24/25 1730     Extra Tube Hold for add-ons.     Comment: Auto resulted       POC Lactate [[id number]]  (Abnormal) Collected: 06/24/25 1724    Specimen: Blood Updated: 06/24/25 1726     Lactate 3.3 mmol/L      Comment: Serial Number: [id number]:  [id number]                Results for orders placed during the hospital encounter of 06/24/25    Adult Transthoracic Echo Complete W/ Cont if Necessary Per Protocol 06/25/2025  4:54 PM    Interpretation Summary    Left ventricular ejection fraction appears to be 51 - 55%.    The right ventricular cavity is mildly dilated.    Severe aortic valve stenosis is present.    Estimated right ventricular systolic pressure from tricuspid regurgitation is normal (<35 mmHg).              Condition on Discharge: Poor, frail, hemodynamically stable    Vital Signs  Temp:  [97.6 °F (36.4 °C)-98.3 °F (36.8 °C)] 97.6 °F (36.4 °C)  Heart Rate:  [105-116] 109  Resp:  [15-28] 17  BP: (100-119)/(60-69) 100/65    Physical Exam:     General Appearance:    Alert, cooperative, in no acute distress, chronically ill-appearing, hard of hearing   Head:    Normocephalic, without obvious abnormality, atraumatic   Eyes:            Lids and lashes normal, conjunctivae and sclerae normal, no   icterus, no pallor, corneas clear, PERRLA   Ears:    Ears appear intact with no abnormalities noted   Throat:   No oral lesions, no thrush, oral mucosa moist   Neck:   No adenopathy, supple, trachea midline, no thyromegaly, no   carotid bruit, no JVD   Lungs:   Few scattered rhonchi    Heart:    Regular rhythm and normal rate, normal S1 and S2, no            murmur, no gallop, no rub, no click   Chest Wall:    No abnormalities observed   Abdomen:     Normal bowel sounds, no masses, no organomegaly, soft        non-tender, non-distended, no guarding, no rebound                tenderness   Extremities:   Moves all extremities well, no edema, no cyanosis, no             redness   Pulses:   Pulses palpable  and equal bilaterally   Skin: Soft tissue mass in right shoulder, right anterior chest wall, lower abdomen   Lymph nodes:   No palpable adenopathy   Neurologic:   Cranial nerves 2 - 12 grossly intact, sensation intact, DTR       present and equal bilaterally       Discharge Disposition  Home or Self Care    Discharge Medications     Discharge Medications        New Medications        Instructions Start Date   acetaminophen 325 MG tablet  Commonly known as: TYLENOL   650 mg, Oral, Every 4 Hours PRN      ipratropium-albuterol 0.5-2.5 mg/3 ml nebulizer  Commonly known as: DUO-NEB   3 mL, Nebulization, Every 4 Hours PRN      nicotine 21 MG/24HR patch  Commonly known as: NICODERM CQ   1 patch, Transdermal, Daily   Start Date: July 2, 2025     ondansetron ODT 4 MG disintegrating tablet  Commonly known as: ZOFRAN-ODT   4 mg, Oral, Every 6 Hours PRN      polyethylene glycol 17 g packet  Commonly known as: MIRALAX   17 g, Oral, Daily   Start Date: July 2, 2025            Changes to Medications        Instructions Start Date   HYDROcodone-acetaminophen  MG per tablet  Commonly known as: NORCO  What changed:   when to take this  reasons to take this   1 tablet, Oral, Every 6 Hours PRN      metoprolol tartrate 25 MG tablet  Commonly known as: LOPRESSOR  What changed:   medication strength  See the new instructions.   25 mg, Oral, Every 12 Hours Scheduled      terazosin 2 MG capsule  Commonly known as: HYTRIN  What changed: See the new instructions.   2 mg, Oral, Nightly             Continue These Medications        Instructions Start Date   omeprazole 20 MG capsule  Commonly known as: priLOSEC   OMEPRAZOLE 20 MG CPDR             Stop These Medications      amLODIPine 5 MG tablet  Commonly known as: NORVASC     citalopram 40 MG tablet  Commonly known as: CeleXA     cloNIDine 0.2 MG tablet  Commonly known as: CATAPRES              Discharge Diet: Regular    Activity at Discharge: As tolerated    Follow-up Appointments  No  future appointments.  Additional Instructions for the Follow-ups that You Need to Schedule       Discharge Follow-up with PCP   As directed       Currently Documented PCP:    Tomer Azevedo MD    PCP Phone Number:    [phone]     Follow Up Details: Call office for any needs.                Test Results Pending at Discharge  Pending Results       Procedure [Order ID] Specimen - Date/Time    Flow Cytometry ([facility]) [[id number]] Collected: 06/28/25 1157    Specimen: Blood from Arm, Right Updated: 06/28/25 1200    Occult Blood, Fecal By Immunoassay - Stool, Per Rectum [[id number]]     Specimen: Stool from Per Rectum     Occult Blood, Fecal By Immunoassay - Stool, Per Rectum [[id number]]     Specimen: Stool from Per Rectum              Ema Collazo MD  07/01/25  12:08 EDT    Time: Discharge 35 min

## 2025-07-01 NOTE — SIGNIFICANT NOTE
07/01/25 1317   OTHER   Discipline occupational therapist;physical therapist   Rehab Time/Intention   Session Not Performed other (see comments)  (Pt discharging home with hospice this date per chart.)

## 2025-07-01 NOTE — CASE MANAGEMENT/SOCIAL WORK
Continued Stay Note  SUNG Sam     Patient Name: Jeremias Negro  MRN: [id number]  Today's Date: 7/1/2025    Admit Date: 6/24/2025    Plan: Anticipate routine home with [facility].   Discharge Plan       Row Name 07/01/25 5892       Plan    Plan Comments MD inquired about home nebulizer and reported that patient would benefit from duonebs. CM updated hospice liaison Kyra who confirmed that they could order. CM updated MD/RN that patient's son could provide transportation home at 2pm.    Final Discharge Disposition Code 50 - home with hospice    Final Note Home with [facility]             Expected Discharge Date and Time       Expected Discharge Date Expected Discharge Time    Jul 1, 2025         Case Management Discharge Note  Final Note: Home with [facility]    Selected Continued Care - Discharged on 7/1/2025 Admission date: 6/24/2025 - Discharge disposition: Home or Self Care      Home Medical Care Coordination complete. Patient indicates having no preference.      Service Provider Services Address Phone Fax Patient Preferred    [facility] [address] [place] [address] [phone] -- --           Transportation Services  Transportation: Private Transportation  Private: Car  Final Discharge Disposition Code: 50 - home with hospice    Lilibeth Chavez RN     Office Phone: [phone]  Office Cell: [phone]     no

## 2025-07-01 NOTE — CASE MANAGEMENT/SOCIAL WORK
Continued Stay Note   Sam     Patient Name: Jeremias Negro  MRN: [id number]  Today's Date: 7/1/2025    Admit Date: 6/24/2025    Plan: Anticipate routine home with [facility].   Discharge Plan       Row Name 07/01/25 1128       Plan    Plan Anticipate routine home with [facility].    Patient/Family in Agreement with Plan yes    Plan Comments CM spoke with [facility] liaison Kyra regarding patient’s d/c plan. She reported that she spoke with Jeremias, patient’s stepson who would like to proceed with hospice at home. Reported that equipment has been ordered and they were working on getting the house set up. Confirmed that it should be delivered within 4 hours. CM contacted MD/RN to inquire if d/c anticipated once arrangements are finalized. MD confirmed. Hospice liaison updated who reported that equipment was ordered for STAT delivery and Jeremias said patient would be going by private vehicle. IRIS updated [facility] liaison Kandace CHAHAL of change in plan to instead go home with hospice.                  Lilibeth Chavez RN     Office Phone: [phone]  Office Cell: [phone]

## 2025-07-02 LAB
LAB AP CASE REPORT: NORMAL
LAB AP DIAGNOSIS COMMENT: NORMAL
Lab: NORMAL
Lab: NORMAL
PATH REPORT.FINAL DX SPEC: NORMAL
PATH REPORT.GROSS SPEC: NORMAL